# Patient Record
Sex: FEMALE | Race: WHITE | NOT HISPANIC OR LATINO | Employment: FULL TIME | URBAN - NONMETROPOLITAN AREA
[De-identification: names, ages, dates, MRNs, and addresses within clinical notes are randomized per-mention and may not be internally consistent; named-entity substitution may affect disease eponyms.]

---

## 2017-06-14 ENCOUNTER — OFFICE VISIT (OUTPATIENT)
Dept: FAMILY MEDICINE CLINIC | Facility: CLINIC | Age: 22
End: 2017-06-14

## 2017-06-14 VITALS
HEIGHT: 67 IN | BODY MASS INDEX: 40.65 KG/M2 | SYSTOLIC BLOOD PRESSURE: 140 MMHG | TEMPERATURE: 98.6 F | DIASTOLIC BLOOD PRESSURE: 100 MMHG | OXYGEN SATURATION: 95 % | WEIGHT: 259 LBS | HEART RATE: 79 BPM

## 2017-06-14 DIAGNOSIS — E66.01 MORBID OBESITY DUE TO EXCESS CALORIES (HCC): Primary | ICD-10-CM

## 2017-06-14 DIAGNOSIS — I10 ESSENTIAL HYPERTENSION: ICD-10-CM

## 2017-06-14 PROCEDURE — 99214 OFFICE O/P EST MOD 30 MIN: CPT | Performed by: FAMILY MEDICINE

## 2017-06-14 RX ORDER — PHENTERMINE HYDROCHLORIDE 15 MG/1
15 CAPSULE ORAL EVERY MORNING
Qty: 30 CAPSULE | Refills: 0 | Status: SHIPPED | OUTPATIENT
Start: 2017-06-14 | End: 2017-07-12 | Stop reason: SDUPTHER

## 2017-06-14 RX ORDER — LISINOPRIL 10 MG/1
10 TABLET ORAL DAILY
Qty: 90 TABLET | Refills: 1 | Status: SHIPPED | OUTPATIENT
Start: 2017-06-14 | End: 2017-07-12 | Stop reason: SDUPTHER

## 2017-06-14 NOTE — PATIENT INSTRUCTIONS
Calorie Counting for Weight Loss  Calories are energy you get from the things you eat and drink. Your body uses this energy to keep you going throughout the day. The number of calories you eat affects your weight. When you eat more calories than your body needs, your body stores the extra calories as fat. When you eat fewer calories than your body needs, your body burns fat to get the energy it needs.  Calorie counting means keeping track of how many calories you eat and drink each day. If you make sure to eat fewer calories than your body needs, you should lose weight. In order for calorie counting to work, you will need to eat the number of calories that are right for you in a day to lose a healthy amount of weight per week. A healthy amount of weight to lose per week is usually 1-2 lb (0.5-0.9 kg). A dietitian can determine how many calories you need in a day and give you suggestions on how to reach your calorie goal.   WHAT IS MY MY PLAN?  My goal is to have __________ calories per day.   If I have this many calories per day, I should lose around __________ pounds per week.  WHAT DO I NEED TO KNOW ABOUT CALORIE COUNTING?  In order to meet your daily calorie goal, you will need to:  · Find out how many calories are in each food you would like to eat. Try to do this before you eat.  · Decide how much of the food you can eat.  · Write down what you ate and how many calories it had. Doing this is called keeping a food log.  WHERE DO I FIND CALORIE INFORMATION?  The number of calories in a food can be found on a Nutrition Facts label. Note that all the information on a label is based on a specific serving of the food. If a food does not have a Nutrition Facts label, try to look up the calories online or ask your dietitian for help.  HOW DO I DECIDE HOW MUCH TO EAT?  To decide how much of the food you can eat, you will need to consider both the number of calories in one serving and the size of one serving. This  information can be found on the Nutrition Facts label. If a food does not have a Nutrition Facts label, look up the information online or ask your dietitian for help.  Remember that calories are listed per serving. If you choose to have more than one serving of a food, you will have to multiply the calories per serving by the amount of servings you plan to eat. For example, the label on a package of bread might say that a serving size is 1 slice and that there are 90 calories in a serving. If you eat 1 slice, you will have eaten 90 calories. If you eat 2 slices, you will have eaten 180 calories.  HOW DO I KEEP A FOOD LOG?  After each meal, record the following information in your food log:  · What you ate.  · How much of it you ate.  · How many calories it had.  · Then, add up your calories.  Keep your food log near you, such as in a small notebook in your pocket. Another option is to use a mobile meg or website. Some programs will calculate calories for you and show you how many calories you have left each time you add an item to the log.  WHAT ARE SOME CALORIE COUNTING TIPS?  · Use your calories on foods and drinks that will fill you up and not leave you hungry. Some examples of this include foods like nuts and nut butters, vegetables, lean proteins, and high-fiber foods (more than 5 g fiber per serving).  · Eat nutritious foods and avoid empty calories. Empty calories are calories you get from foods or beverages that do not have many nutrients, such as candy and soda. It is better to have a nutritious high-calorie food (such as an avocado) than a food with few nutrients (such as a bag of chips).  · Know how many calories are in the foods you eat most often. This way, you do not have to look up how many calories they have each time you eat them.  · Look out for foods that may seem like low-calorie foods but are really high-calorie foods, such as baked goods, soda, and fat-free candy.  · Pay attention to calories  in drinks. Drinks such as sodas, specialty coffee drinks, alcohol, and juices have a lot of calories yet do not fill you up. Choose low-calorie drinks like water and diet drinks.  · Focus your calorie counting efforts on higher calorie items. Logging the calories in a garden salad that contains only vegetables is less important than calculating the calories in a milk shake.  · Find a way of tracking calories that works for you. Get creative. Most people who are successful find ways to keep track of how much they eat in a day, even if they do not count every calorie.  WHAT ARE SOME PORTION CONTROL TIPS?  · Know how many calories are in a serving. This will help you know how many servings of a certain food you can have.  · Use a measuring cup to measure serving sizes. This is helpful when you start out. With time, you will be able to estimate serving sizes for some foods.  · Take some time to put servings of different foods on your favorite plates, bowls, and cups so you know what a serving looks like.  · Try not to eat straight from a bag or box. Doing this can lead to overeating. Put the amount you would like to eat in a cup or on a plate to make sure you are eating the right portion.  · Use smaller plates, glasses, and bowls to prevent overeating. This is a quick and easy way to practice portion control. If your plate is smaller, less food can fit on it.  · Try not to multitask while eating, such as watching TV or using your computer. If it is time to eat, sit down at a table and enjoy your food. Doing this will help you to start recognizing when you are full. It will also make you more aware of what and how much you are eating.  HOW CAN I CALORIE COUNT WHEN EATING OUT?  · Ask for smaller portion sizes or child-sized portions.  · Consider sharing an entree and sides instead of getting your own entree.  · If you get your own entree, eat only half. Ask for a box at the beginning of your meal and put the rest of your  "entree in it so you are not tempted to eat it.  · Look for the calories on the menu. If calories are listed, choose the lower calorie options.  · Choose dishes that include vegetables, fruits, whole grains, low-fat dairy products, and lean protein. Focusing on smart food choices from each of the 5 food groups can help you stay on track at restaurants.  · Choose items that are boiled, broiled, grilled, or steamed.  · Choose water, milk, unsweetened iced tea, or other drinks without added sugars. If you want an alcoholic beverage, choose a lower calorie option. For example, a regular slim can have up to 700 calories and a glass of wine has around 150.  · Stay away from items that are buttered, battered, fried, or served with cream sauce. Items labeled \"crispy\" are usually fried, unless stated otherwise.  · Ask for dressings, sauces, and syrups on the side. These are usually very high in calories, so do not eat much of them.  · Watch out for salads. Many people think salads are a healthy option, but this is often not the case. Many salads come with soto, fried chicken, lots of cheese, fried chips, and dressing. All of these items have a lot of calories. If you want a salad, choose a garden salad and ask for grilled meats or steak. Ask for the dressing on the side, or ask for olive oil and vinegar or lemon to use as dressing.  · Estimate how many servings of a food you are given. For example, a serving of cooked rice is ½ cup or about the size of half a tennis ball or one cupcake wrapper. Knowing serving sizes will help you be aware of how much food you are eating at restaurants. The list below tells you how big or small some common portion sizes are based on everyday objects.    1 oz--4 stacked dice.    3 oz--1 deck of cards.    1 tsp--1 dice.    1 Tbsp--½ a Ping-Pong ball.    2 Tbsp--1 Ping-Pong ball.    ½ cup--1 tennis ball or 1 cupcake wrapper.    1 cup--1 baseball.     This information is not intended to " replace advice given to you by your health care provider. Make sure you discuss any questions you have with your health care provider.     Document Released: 12/18/2006 Document Revised: 01/08/2016 Document Reviewed: 10/23/2014  [employer] Interactive Patient Education ©2017 [employer]    Exercising to Lose Weight  Exercising can help you to lose weight. In order to lose weight through exercise, you need to do vigorous-intensity exercise. You can tell that you are exercising with vigorous intensity if you are breathing very hard and fast and cannot hold a conversation while exercising.  Moderate-intensity exercise helps to maintain your current weight. You can tell that you are exercising at a moderate level if you have a higher heart rate and faster breathing, but you are still able to hold a conversation.  HOW OFTEN SHOULD I EXERCISE?  Choose an activity that you enjoy and set realistic goals. Your health care provider can help you to make an activity plan that works for you. Exercise regularly as directed by your health care provider. This may include:  · Doing resistance training twice each week, such as:    Push-ups.    Sit-ups.    Lifting weights.    Using resistance bands.  · Doing a given intensity of exercise for a given amount of time. Choose from these options:    150 minutes of moderate-intensity exercise every week.    75 minutes of vigorous-intensity exercise every week.    A mix of moderate-intensity and vigorous-intensity exercise every week.  Children, pregnant women, people who are out of shape, people who are overweight, and older adults may need to consult a health care provider for individual recommendations. If you have any sort of medical condition, be sure to consult your health care provider before starting a new exercise program.  WHAT ARE SOME ACTIVITIES THAT CAN HELP ME TO LOSE WEIGHT?   · Walking at a rate of at least 4.5 miles an hour.  · Jogging or running at a rate of 5 miles per  hour.  · Biking at a rate of at least 10 miles per hour.  · Lap swimming.  · Roller-skating or in-line skating.  · Cross-country skiing.  · Vigorous competitive sports, such as football, basketball, and soccer.  · Jumping rope.  · Aerobic dancing.  HOW CAN I BE MORE ACTIVE IN MY DAY-TO-DAY ACTIVITIES?  · Use the stairs instead of the elevator.  · Take a walk during your lunch break.  · If you drive, park your car farther away from work or school.  · If you take public transportation, get off one stop early and walk the rest of the way.  · Make all of your phone calls while standing up and walking around.  · Get up, stretch, and walk around every 30 minutes throughout the day.  WHAT GUIDELINES SHOULD I FOLLOW WHILE EXERCISING?  · Do not exercise so much that you hurt yourself, feel dizzy, or get very short of breath.  · Consult your health care provider prior to starting a new exercise program.  · Wear comfortable clothes and shoes with good support.  · Drink plenty of water while you exercise to prevent dehydration or heat stroke. Body water is lost during exercise and must be replaced.  · Work out until you breathe faster and your heart beats faster.     This information is not intended to replace advice given to you by your health care provider. Make sure you discuss any questions you have with your health care provider.     Document Released: 01/20/2012 Document Revised: 01/08/2016 Document Reviewed: 05/21/2015  [employer] Interactive Patient Education ©2017 [employer]

## 2017-06-14 NOTE — PROGRESS NOTES
"Subjective   Chief Complaint   Patient presents with   • wants weight loss medication     Alka Boykin is a 22 y.o. female.   wants weight loss medication    History of Present Illness     Morbid obesity - struggling to lose weight  Has failed a low calorie diet, low carbohydrate diet, truevision, arana, barros  Restricted caffeine, just drinking water  Exercises daily with walking - 4 miles    Hypertension - last two office visits blood pressure had been elevated    The following portions of the patient's history were reviewed and updated as appropriate: allergies, current medications, past family history, past medical history, past social history, past surgical history and problem list.    Review of Systems   Constitutional: Negative for appetite change, chills, fatigue and fever.   HENT: Negative for congestion, ear pain, rhinorrhea and sore throat.    Eyes: Negative for pain.   Respiratory: Negative for cough and shortness of breath.    Cardiovascular: Negative for chest pain and palpitations.   Gastrointestinal: Negative for abdominal pain, constipation, diarrhea and nausea.   Genitourinary: Negative for dysuria.   Musculoskeletal: Negative for back pain, joint swelling and neck pain.   Skin: Negative for rash.   Neurological: Negative for dizziness and headaches.       Objective   /100  Pulse 79  Temp 98.6 °F (37 °C)  Ht 67\" (170.2 cm)  Wt 259 lb (117 kg)  LMP 06/14/2017  SpO2 95%  BMI 40.57 kg/m2  Physical Exam   Constitutional: She is oriented to person, place, and time. She appears well-developed and well-nourished.   HENT:   Head: Normocephalic and atraumatic.   Eyes: Pupils are equal, round, and reactive to light.   Neck: Normal range of motion. Neck supple.   Cardiovascular: Normal rate, regular rhythm and normal heart sounds.    Pulmonary/Chest: Effort normal and breath sounds normal. No respiratory distress. She has no wheezes. She has no rales.   Abdominal: Soft. Bowel sounds are " normal.   Central obesity   Musculoskeletal: Normal range of motion.   Neurological: She is alert and oriented to person, place, and time.   Skin: Skin is warm and dry.   Psychiatric: She has a normal mood and affect.   Nursing note and vitals reviewed.      Assessment/Plan   Problems Addressed this Visit        Digestive    Morbid obesity due to excess calories - Primary      Other Visit Diagnoses     Essential hypertension        Relevant Medications    lisinopril (PRINIVIL,ZESTRIL) 10 MG tablet        Medical release from Dr Hess - lab results, papsmear    Start phentermine  ra reviewed [id number]  RA query complete. Treatment plan to include limited course of prescribed  controlled substance. Risks including addiction, benefits, and alternatives presented to patient.   Controlled contract signed  Weight loss information provided to the patient    Start lisinopril    Recheck in 4 weeks

## 2017-07-12 ENCOUNTER — OFFICE VISIT (OUTPATIENT)
Dept: FAMILY MEDICINE CLINIC | Facility: CLINIC | Age: 22
End: 2017-07-12

## 2017-07-12 VITALS
BODY MASS INDEX: 39.39 KG/M2 | DIASTOLIC BLOOD PRESSURE: 84 MMHG | TEMPERATURE: 97.2 F | HEART RATE: 84 BPM | OXYGEN SATURATION: 98 % | WEIGHT: 251 LBS | HEIGHT: 67 IN | SYSTOLIC BLOOD PRESSURE: 140 MMHG

## 2017-07-12 DIAGNOSIS — E66.09 NON MORBID OBESITY DUE TO EXCESS CALORIES: ICD-10-CM

## 2017-07-12 DIAGNOSIS — E66.01 MORBID OBESITY DUE TO EXCESS CALORIES (HCC): ICD-10-CM

## 2017-07-12 DIAGNOSIS — I10 ESSENTIAL HYPERTENSION: Primary | ICD-10-CM

## 2017-07-12 PROCEDURE — 99214 OFFICE O/P EST MOD 30 MIN: CPT | Performed by: FAMILY MEDICINE

## 2017-07-12 RX ORDER — PHENTERMINE HYDROCHLORIDE 15 MG/1
15 CAPSULE ORAL EVERY MORNING
Qty: 30 CAPSULE | Refills: 0 | Status: SHIPPED | OUTPATIENT
Start: 2017-07-12 | End: 2017-08-09 | Stop reason: SDUPTHER

## 2017-07-12 RX ORDER — LISINOPRIL 10 MG/1
10 TABLET ORAL DAILY
Qty: 90 TABLET | Refills: 1 | Status: SHIPPED | OUTPATIENT
Start: 2017-07-12 | End: 2017-07-12

## 2017-07-12 RX ORDER — LISINOPRIL 10 MG/1
20 TABLET ORAL DAILY
Qty: 90 TABLET | Refills: 1
Start: 2017-07-12 | End: 2017-11-07

## 2017-07-12 NOTE — PATIENT INSTRUCTIONS
Recommend metamucil or benefiber daily  Or can increase dietary fiber to your daily diet  Continue with diet, exercise and weight loss  Refilled phentermine  Adjusted lisinopril to 20mg per day  Recheck in 4 weeks

## 2017-07-12 NOTE — PROGRESS NOTES
"Subjective   Chief Complaint   Patient presents with   • Weight Check     4 week follow up   • Med Refill     Alka Boykin is a 22 y.o. female.   Weight Check (4 week follow up) and Med Refill    History of Present Illness     Morbid obesity -  Started with phentermine last month and has successfully lost weight  Current weight at 251lbs, lost 8 lbs from her last visit  Has dropped from BMI 40 to 39  Is walking 4 miles per day and swimming often in the pool  She is following a restricted diet  Side effect reported with diarrhea - 2-3 episodes per day  Has failed a low calorie diet, low carbohydrate diet, truevision, arana, barros  Restricted caffeine, just drinking water    Hypertension - not controlled with lisinopril    The following portions of the patient's history were reviewed and updated as appropriate: allergies, current medications, past family history, past medical history, past social history, past surgical history and problem list.    Review of Systems   Constitutional: Negative for appetite change, chills, fatigue and fever.   HENT: Negative for congestion, ear pain, rhinorrhea and sore throat.    Eyes: Negative for pain.   Respiratory: Negative for cough and shortness of breath.    Cardiovascular: Negative for chest pain and palpitations.   Gastrointestinal: Positive for diarrhea. Negative for abdominal pain, constipation and nausea.   Genitourinary: Negative for dysuria.   Musculoskeletal: Negative for back pain, joint swelling and neck pain.   Skin: Negative for rash.   Neurological: Negative for dizziness and headaches.       Objective   /84  Pulse 84  Temp 97.2 °F (36.2 °C)  Ht 67\" (170.2 cm)  Wt 251 lb (114 kg)  LMP 06/14/2017  SpO2 98%  BMI 39.31 kg/m2  Physical Exam   Constitutional: She is oriented to person, place, and time. She appears well-developed and well-nourished.   HENT:   Head: Normocephalic and atraumatic.   Eyes: Pupils are equal, round, and reactive to light. "   Neck: Normal range of motion. Neck supple.   Cardiovascular: Normal rate, regular rhythm and normal heart sounds.    Pulmonary/Chest: Effort normal and breath sounds normal. No respiratory distress. She has no wheezes. She has no rales.   Abdominal: Soft. Bowel sounds are normal.   Central obesity   Musculoskeletal: Normal range of motion.   Neurological: She is alert and oriented to person, place, and time.   Skin: Skin is warm and dry.   Psychiatric: She has a normal mood and affect.   Nursing note and vitals reviewed.      Assessment/Plan   Problems Addressed this Visit        Digestive    Non morbid obesity due to excess calories    RESOLVED: Morbid obesity due to excess calories      Other Visit Diagnoses     Essential hypertension    -  Primary    Relevant Medications    lisinopril (PRINIVIL,ZESTRIL) 10 MG tablet        Recommended metamucil or benefiber daily  Or can increase dietary fiber to your daily diet  Continue with diet, exercise and weight loss  Refilled phentermine  Adjusted lisinopril to 20mg per day  Recheck in 4 weeks

## 2017-08-09 ENCOUNTER — OFFICE VISIT (OUTPATIENT)
Dept: FAMILY MEDICINE CLINIC | Facility: CLINIC | Age: 22
End: 2017-08-09

## 2017-08-09 VITALS
HEIGHT: 67 IN | DIASTOLIC BLOOD PRESSURE: 70 MMHG | BODY MASS INDEX: 39.08 KG/M2 | SYSTOLIC BLOOD PRESSURE: 120 MMHG | OXYGEN SATURATION: 98 % | TEMPERATURE: 97.6 F | WEIGHT: 249 LBS | HEART RATE: 109 BPM

## 2017-08-09 DIAGNOSIS — I10 ESSENTIAL HYPERTENSION: ICD-10-CM

## 2017-08-09 DIAGNOSIS — E66.09 NON MORBID OBESITY DUE TO EXCESS CALORIES: Primary | ICD-10-CM

## 2017-08-09 PROCEDURE — 99213 OFFICE O/P EST LOW 20 MIN: CPT | Performed by: FAMILY MEDICINE

## 2017-08-09 RX ORDER — PHENTERMINE HYDROCHLORIDE 30 MG/1
30 CAPSULE ORAL EVERY MORNING
Qty: 30 CAPSULE | Refills: 0 | Status: SHIPPED | OUTPATIENT
Start: 2017-08-09 | End: 2017-11-07

## 2017-08-09 RX ORDER — LISINOPRIL 10 MG/1
20 TABLET ORAL DAILY
Qty: 90 TABLET | Refills: 1 | Status: CANCELLED
Start: 2017-08-09 | End: 2018-02-05

## 2017-08-09 NOTE — PROGRESS NOTES
"Subjective   Chief Complaint   Patient presents with   • Weight Check     4 week follow up   • Med Refill     Alka Boykin is a 22 y.o. female.   Weight Check (4 week follow up) and Med Refill    History of Present Illness     Morbid obesity -  Gradually improving with phentermine  Current weight at 249lbs, down 2lbs from last visit  Current BMI at 39  Is walking 4 miles per day and swimming often in the pool  She is following a restricted diet  Has failed a low calorie diet, low carbohydrate diet, truevision, arana, barros  Restricted caffeine, just drinking water  Would like medication adjusted today    Hypertension - controlled with lisinopril    The following portions of the patient's history were reviewed and updated as appropriate: allergies, current medications, past family history, past medical history, past social history, past surgical history and problem list.    Review of Systems   Constitutional: Negative for appetite change, chills, fatigue and fever.   HENT: Negative for congestion, ear pain, rhinorrhea and sore throat.    Eyes: Negative for pain.   Respiratory: Negative for cough and shortness of breath.    Cardiovascular: Negative for chest pain and palpitations.   Gastrointestinal: Negative for abdominal pain, constipation, diarrhea and nausea.   Genitourinary: Negative for dysuria.   Musculoskeletal: Negative for back pain, joint swelling and neck pain.   Skin: Negative for rash.   Neurological: Negative for dizziness and headaches.       Objective   /70  Pulse 109  Temp 97.6 °F (36.4 °C)  Ht 67\" (170.2 cm)  Wt 249 lb (113 kg)  LMP 07/11/2017  SpO2 98%  BMI 39 kg/m2  Physical Exam   Constitutional: She is oriented to person, place, and time. She appears well-developed and well-nourished.   HENT:   Head: Normocephalic and atraumatic.   Eyes: Pupils are equal, round, and reactive to light.   Neck: Normal range of motion. Neck supple.   Cardiovascular: Normal rate, regular rhythm and " normal heart sounds.    Pulmonary/Chest: Effort normal and breath sounds normal. No respiratory distress. She has no wheezes. She has no rales.   Abdominal: Soft. Bowel sounds are normal.   Musculoskeletal: Normal range of motion.   Neurological: She is alert and oriented to person, place, and time.   Skin: Skin is warm and dry.   Psychiatric: She has a normal mood and affect.   Nursing note and vitals reviewed.      Assessment/Plan   Problems Addressed this Visit        Cardiovascular and Mediastinum    Essential hypertension       Digestive    Non morbid obesity due to excess calories - Primary        Continue with lisinopril    Adjusted phentermine    PHQ done    Monitor HR    Discussed diet, exercise and weight loss    Recheck in 4 weeks

## 2017-11-07 ENCOUNTER — OFFICE VISIT (OUTPATIENT)
Dept: FAMILY MEDICINE CLINIC | Facility: CLINIC | Age: 22
End: 2017-11-07

## 2017-11-07 VITALS
HEIGHT: 67 IN | SYSTOLIC BLOOD PRESSURE: 120 MMHG | BODY MASS INDEX: 40.18 KG/M2 | HEART RATE: 106 BPM | DIASTOLIC BLOOD PRESSURE: 74 MMHG | WEIGHT: 256 LBS | OXYGEN SATURATION: 99 % | TEMPERATURE: 98 F

## 2017-11-07 DIAGNOSIS — E28.2 PCOS (POLYCYSTIC OVARIAN SYNDROME): Primary | ICD-10-CM

## 2017-11-07 DIAGNOSIS — E66.09 NON MORBID OBESITY DUE TO EXCESS CALORIES: ICD-10-CM

## 2017-11-07 DIAGNOSIS — Z31.9 PATIENT DESIRES PREGNANCY: ICD-10-CM

## 2017-11-07 PROBLEM — I10 ESSENTIAL HYPERTENSION: Status: RESOLVED | Noted: 2017-08-09 | Resolved: 2017-11-07

## 2017-11-07 PROCEDURE — 99214 OFFICE O/P EST MOD 30 MIN: CPT | Performed by: FAMILY MEDICINE

## 2017-11-07 RX ORDER — PRENATAL VIT NO.126/IRON/FOLIC 28MG-0.8MG
1 TABLET ORAL DAILY
Qty: 90 TABLET | Refills: 3 | Status: SHIPPED | OUTPATIENT
Start: 2017-11-07 | End: 2018-03-23

## 2017-11-07 NOTE — PROGRESS NOTES
"Subjective   Chief Complaint   Patient presents with   • Weight Check     3 months   • wants to talk about ovaries     Alka Boykin is a 22 y.o. female.   Weight Check (3 months) and wants to talk about ovaries    History of Present Illness     Morbid obesity -  Remains uncontrolled  Current weight at 256lbs, Current BMI at 40    Hypertension - resolved    PCOS - patient desires pregnancy  Currently taking prenatal vitamins  Worried about PCOS diagnosis - unsure about whether this is a true and active problem  She did see Dr Meredith and Dr Hess  Has received 5 rounds of clomid with metformin and was unable to conceive   Her  has been evaluated - no significant findings  She has been following an ovulation calendar and used ovulation sticks    The following portions of the patient's history were reviewed and updated as appropriate: allergies, current medications, past family history, past medical history, past social history, past surgical history and problem list.    Review of Systems   Constitutional: Negative for appetite change, chills, fatigue and fever.   HENT: Negative for congestion, ear pain, rhinorrhea and sore throat.    Eyes: Negative for pain.   Respiratory: Negative for cough and shortness of breath.    Cardiovascular: Negative for chest pain and palpitations.   Gastrointestinal: Negative for abdominal pain, constipation, diarrhea and nausea.   Genitourinary: Negative for dysuria.   Musculoskeletal: Negative for back pain, joint swelling and neck pain.   Skin: Negative for rash.   Neurological: Negative for dizziness and headaches.       Objective   /74  Pulse 106  Temp 98 °F (36.7 °C)  Ht 67\" (170.2 cm)  Wt 256 lb (116 kg)  LMP 10/14/2017  SpO2 99%  BMI 40.1 kg/m2  Physical Exam   Constitutional: She is oriented to person, place, and time. She appears well-developed and well-nourished.   HENT:   Head: Normocephalic and atraumatic.   Eyes: Pupils are equal, round, and " reactive to light.   Neck: Normal range of motion. Neck supple.   Cardiovascular: Normal rate, regular rhythm and normal heart sounds.    Pulmonary/Chest: Effort normal and breath sounds normal. No respiratory distress. She has no wheezes. She has no rales.   Abdominal: Soft. Bowel sounds are normal.   Central obesity   Neurological: She is alert and oriented to person, place, and time.   Skin: Skin is warm and dry.   Psychiatric: She has a normal mood and affect.   tearful   Nursing note and vitals reviewed.      Assessment/Plan   Problems Addressed this Visit        Digestive    Non morbid obesity due to excess calories      Other Visit Diagnoses     PCOS (polycystic ovarian syndrome)    -  Primary    Relevant Orders    Ambulatory Referral to Gynecology    Patient desires pregnancy        Relevant Orders    Ambulatory Referral to Gynecology        Call to Kristel Redd's office  Reviewed medical records  Referral to Kristel for further evaluation and possible referral to fertility  Updated medication list  Recommended diet, exercise and weight loss  Continue with prenatal vitamins  Recheck as needed

## 2017-11-15 ENCOUNTER — OFFICE VISIT (OUTPATIENT)
Dept: OBSTETRICS AND GYNECOLOGY | Facility: CLINIC | Age: 22
End: 2017-11-15

## 2017-11-15 VITALS
HEART RATE: 76 BPM | WEIGHT: 262 LBS | RESPIRATION RATE: 18 BRPM | BODY MASS INDEX: 41.12 KG/M2 | DIASTOLIC BLOOD PRESSURE: 80 MMHG | SYSTOLIC BLOOD PRESSURE: 122 MMHG | HEIGHT: 67 IN

## 2017-11-15 DIAGNOSIS — Z78.9 ATTEMPTING TO CONCEIVE: ICD-10-CM

## 2017-11-15 DIAGNOSIS — E28.2 PCOS (POLYCYSTIC OVARIAN SYNDROME): Primary | ICD-10-CM

## 2017-11-15 DIAGNOSIS — N97.0 ANOVULATION: ICD-10-CM

## 2017-11-15 PROCEDURE — 99214 OFFICE O/P EST MOD 30 MIN: CPT | Performed by: NURSE PRACTITIONER

## 2017-11-15 RX ORDER — METFORMIN HYDROCHLORIDE 500 MG/1
1000 TABLET, EXTENDED RELEASE ORAL
Qty: 60 TABLET | Refills: 5 | Status: SHIPPED | OUTPATIENT
Start: 2017-11-15 | End: 2018-03-07 | Stop reason: SDUPTHER

## 2017-11-15 NOTE — PROGRESS NOTES
"Mary Beth Boykin is a 22 y.o. female.     History of Present Illness   Pt presents for further evaluation and management of PCOS and difficulty conceiving. Pt notes she and her  TTC x 2 years. Approximately 1 year ago, pt saw Dr. Charles for this. States she started Metformin 500mg, tried to increase to 1000mg but was having low glucose episodes, and continued at 500mg daily without problems. Without success, Melvin started pt on Clomid. According to records, 50mg x 3 months, 100mg x 2. She states she was told she was ovulating because she began having regular periods. She denies having a day 21 progesterone. She stopped metformin and clomid 2 months ago out of frustration with lack of success. She saw Dr. Martinez, OB/GYN for second opinion and was prescribed another round of 100mg clomid but has not taken. US in August with Dr. Martinez confirms multiple follicular cysts and 1 small hemorrhagic cyst.     Patient's last menstrual period was 11/14/2017 (exact date). Periods are regular \"on the 14th of every month\" since starting clomid. Last 2-3 days, pretty heavy. Pt tracks on a paper calendar.     Pap, G/C testing normal on 2/4/17. Since January lost 40lbs on low carb diet and walking 4 miles a day. She is using pre-seed lubricants.     Pt states she had 2 postcoital sperm checks with Dr. Charles that were positive. Dr. Martinez ordered her  a SA but it has not been performed. She agrees to pursue this and have him checked. He is a diabetic.     She states she is having sex 2 times per day most days. Raises the pelvis and lies flat x 30 minutes.     The following portions of the patient's history were reviewed and updated as appropriate: allergies, current medications, past family history, past medical history, past social history, past surgical history and problem list.    Review of Systems   Constitutional: Negative.         Obesity, intentional weight loss   Endocrine: Negative.  " Negative for cold intolerance, heat intolerance, polydipsia, polyphagia and polyuria.   Genitourinary: Positive for menstrual problem. Negative for dyspareunia, pelvic pain, vaginal discharge and vaginal pain.   Neurological: Negative for dizziness, syncope, light-headedness and headaches.   Psychiatric/Behavioral: Negative for suicidal ideas. The patient is nervous/anxious.         Anger and frustration       Objective   Physical Exam   Constitutional: She is oriented to person, place, and time. She appears well-developed and well-nourished.   Last 2 weights  -------------------------              11/15/17                    1056        -------------------------   Weight: 262 lb (119 kg)  -------------------------  Body mass index is 41.04 kg/(m^2).     Cardiovascular: Normal rate, regular rhythm and normal heart sounds.    Pulmonary/Chest: Effort normal and breath sounds normal.   Neurological: She is alert and oriented to person, place, and time.   Skin: Skin is warm and dry.   Psychiatric: Her behavior is normal. Her mood appears anxious. She exhibits a depressed mood.   tearful   Vitals reviewed.      Assessment/Plan   Alka was seen today for talk about pcos and infertility.    Diagnoses and all orders for this visit:    PCOS (polycystic ovarian syndrome)  -     metFORMIN ER (GLUCOPHAGE-XR) 500 MG 24 hr tablet; Take 2 tablets by mouth Daily With Breakfast.  -     Progesterone; Future    Anovulation  -     metFORMIN ER (GLUCOPHAGE-XR) 500 MG 24 hr tablet; Take 2 tablets by mouth Daily With Breakfast.  -     Progesterone; Future    Attempting to conceive  -     metFORMIN ER (GLUCOPHAGE-XR) 500 MG 24 hr tablet; Take 2 tablets by mouth Daily With Breakfast.  -     Progesterone; Future       Extensive education and counseling provided on PCOS and insulin resistance and infertility  x 30 minutes. Discussed possible use of Femara. Pt wishes to proceed with Clomid 100mg again for now.     Begin  Clomiphene citrate (Clomid) 100mg Cycle Days 3-7 and Guaifenisen 2Tbsp daily beginning the day after last Clomid dose and taken continually through ovulation. Partner to also take Guaifenisen 2TBSP daily during this time.     Begin Ovulation predictor kits 3 days after last Clomid dose, have intercourse every other day, no more than once a day, beginning 3 days after last Clomid dose (Cycle day 10).  If you use lubricants, look into Pre-Seed Lubricant that should not interfere with sperm motility.    Have Day 21 progesterone lab draw due Dec 4th; Urine HCG home tests before next round of Clomid.    Will try 3 successful rounds before referral.  Always continue to take Prenatal Vitamins with Folic Acid. Take Metformin daily. Try taking 2 at breakfast after 1 week of 1 at breakfast if GI symptoms tolerable and no episodes of low glucose.     Recommend continuing low carb diet with a goal of weight loss 1lb per week. Encouraged and reassured pt to exercise and diet despite emotions and infertility for overall health and prevention of co-morbidities.

## 2017-12-04 ENCOUNTER — LAB (OUTPATIENT)
Dept: LAB | Facility: OTHER | Age: 22
End: 2017-12-04

## 2017-12-04 DIAGNOSIS — Z78.9 ATTEMPTING TO CONCEIVE: ICD-10-CM

## 2017-12-04 DIAGNOSIS — N97.0 ANOVULATION: ICD-10-CM

## 2017-12-04 DIAGNOSIS — E28.2 PCOS (POLYCYSTIC OVARIAN SYNDROME): ICD-10-CM

## 2017-12-04 PROCEDURE — 84144 ASSAY OF PROGESTERONE: CPT | Performed by: NURSE PRACTITIONER

## 2017-12-05 LAB — PROGEST SERPL-MCNC: 19.9 NG/ML

## 2017-12-15 ENCOUNTER — DOCUMENTATION (OUTPATIENT)
Dept: OBSTETRICS AND GYNECOLOGY | Facility: CLINIC | Age: 22
End: 2017-12-15

## 2017-12-15 NOTE — PROGRESS NOTES
Pt called stating she started her period yesterday. 31 day cycle with positive ovulation according to progesterone of 19 on day 21. Pt's  still did not complete semen analysis. This was patient's 6th round of clomid. I recommend pt take a break, have  complete semen analysis. Regardless of results, I recommend pt proceed to reproductive endocrinology for further evaluation. I discussed risks of long term clomid use and she verbalizes understanding. She is rightfully stressed and upset with the process of infertility but understands. Pt will call me when he receives his testing and/or she wishes to proceed with referral.

## 2018-02-28 DIAGNOSIS — N97.0 INFERTILITY ASSOCIATED WITH ANOVULATION: Primary | ICD-10-CM

## 2018-03-05 ENCOUNTER — LAB (OUTPATIENT)
Dept: LAB | Facility: OTHER | Age: 23
End: 2018-03-05

## 2018-03-05 DIAGNOSIS — N97.0 INFERTILITY ASSOCIATED WITH ANOVULATION: ICD-10-CM

## 2018-03-05 PROCEDURE — 36415 COLL VENOUS BLD VENIPUNCTURE: CPT | Performed by: NURSE PRACTITIONER

## 2018-03-05 PROCEDURE — 84144 ASSAY OF PROGESTERONE: CPT | Performed by: NURSE PRACTITIONER

## 2018-03-06 LAB — PROGEST SERPL-MCNC: 0.6 NG/ML

## 2018-03-07 DIAGNOSIS — Z78.9 ATTEMPTING TO CONCEIVE: ICD-10-CM

## 2018-03-07 DIAGNOSIS — N97.0 ANOVULATION: ICD-10-CM

## 2018-03-07 DIAGNOSIS — E28.2 PCOS (POLYCYSTIC OVARIAN SYNDROME): ICD-10-CM

## 2018-03-07 RX ORDER — METFORMIN HYDROCHLORIDE 500 MG/1
1000 TABLET, EXTENDED RELEASE ORAL
Qty: 60 TABLET | Refills: 5 | Status: SHIPPED | OUTPATIENT
Start: 2018-03-07 | End: 2018-09-26

## 2018-03-07 NOTE — PROGRESS NOTES
Progesterone is low. Did not ovulate. Pt's  has still not been tested. Once he is tested I will be happy to move forward with femara or refer to reproductive endoocrinology. Pt verbalizes understanding. Continue metformin and prenatals

## 2018-03-23 ENCOUNTER — OFFICE VISIT (OUTPATIENT)
Dept: FAMILY MEDICINE CLINIC | Facility: CLINIC | Age: 23
End: 2018-03-23

## 2018-03-23 VITALS
BODY MASS INDEX: 42.53 KG/M2 | WEIGHT: 271 LBS | SYSTOLIC BLOOD PRESSURE: 134 MMHG | OXYGEN SATURATION: 98 % | TEMPERATURE: 98 F | DIASTOLIC BLOOD PRESSURE: 76 MMHG | HEART RATE: 96 BPM | HEIGHT: 67 IN

## 2018-03-23 DIAGNOSIS — Z71.3 DIETARY COUNSELING AND SURVEILLANCE: ICD-10-CM

## 2018-03-23 DIAGNOSIS — IMO0001 CLASS 3 OBESITY DUE TO EXCESS CALORIES WITHOUT SERIOUS COMORBIDITY WITH BODY MASS INDEX (BMI) OF 40.0 TO 44.9 IN ADULT: Primary | ICD-10-CM

## 2018-03-23 PROBLEM — E66.09 NON MORBID OBESITY DUE TO EXCESS CALORIES: Status: RESOLVED | Noted: 2017-07-12 | Resolved: 2018-03-23

## 2018-03-23 PROCEDURE — 99213 OFFICE O/P EST LOW 20 MIN: CPT | Performed by: FAMILY MEDICINE

## 2018-03-23 RX ORDER — TRIAMCINOLONE ACETONIDE 40 MG/ML
80 INJECTION, SUSPENSION INTRA-ARTICULAR; INTRAMUSCULAR ONCE
Status: DISCONTINUED | OUTPATIENT
Start: 2018-03-23 | End: 2018-03-23

## 2018-03-23 RX ORDER — PHENTERMINE HYDROCHLORIDE 30 MG/1
30 CAPSULE ORAL EVERY MORNING
Qty: 30 CAPSULE | Refills: 0 | Status: SHIPPED | OUTPATIENT
Start: 2018-03-23 | End: 2018-07-25

## 2018-03-23 NOTE — PROGRESS NOTES
"Subjective   Chief Complaint   Patient presents with   • wants phentermine     Alka Albert is a 22 y.o. female.   wants phentermine    History of Present Illness     Presents today for weight loss information   Has been struggling with weight loss  Would like to start phentermine  Has taken medication in the past  She had no trouble or side effects with the medication previously  Current weight is 271lbs, BMI 42  Has been following a restricted diet - eliminated pop from her diet or any high calorie beverages  She has a plan in place to start walking when the weather is nice    The following portions of the patient's history were reviewed and updated as appropriate: allergies, current medications, past family history, past medical history, past social history, past surgical history and problem list.    Review of Systems   Constitutional: Negative for appetite change, chills, fatigue and fever.   HENT: Negative for congestion, ear pain, rhinorrhea and sore throat.    Eyes: Negative for pain.   Respiratory: Negative for cough and shortness of breath.    Cardiovascular: Negative for chest pain and palpitations.   Gastrointestinal: Negative for abdominal pain, constipation, diarrhea and nausea.   Genitourinary: Negative for dysuria.   Musculoskeletal: Negative for back pain, joint swelling and neck pain.   Skin: Negative for rash.   Neurological: Negative for dizziness and headaches.       Objective   /76   Pulse 96   Temp 98 °F (36.7 °C)   Ht 170.2 cm (67.01\")   Wt 123 kg (271 lb)   LMP 03/23/2018 (Exact Date)   SpO2 98%   BMI 42.43 kg/m²   Physical Exam   Constitutional: She is oriented to person, place, and time. She appears well-developed and well-nourished.   HENT:   Head: Normocephalic and atraumatic.   Eyes: Pupils are equal, round, and reactive to light.   Neck: Normal range of motion. Neck supple.   Cardiovascular: Normal rate, regular rhythm and normal heart sounds.    Pulmonary/Chest: Effort " normal and breath sounds normal. No respiratory distress. She has no wheezes. She has no rales.   Abdominal: Soft. Bowel sounds are normal.   Musculoskeletal: Normal range of motion.   Neurological: She is alert and oriented to person, place, and time.   Skin: Skin is warm and dry.   Psychiatric: She has a normal mood and affect.   Nursing note and vitals reviewed.      Assessment/Plan   Problems Addressed this Visit     None      Visit Diagnoses     Class 3 obesity due to excess calories without serious comorbidity with body mass index (BMI) of 40.0 to 44.9 in adult    -  Primary    Relevant Medications    phentermine 30 MG capsule    Dietary counseling and surveillance            Discussed the patient's BMI with her. BMI is above normal parameters. Follow-up plan includes:  educational material, exercise counseling, nutrition counseling and pharmacological intervention.    The patient has read and signed the [facility] Controlled Substance Contract.  I will continue to see patient for regular follow up appointments.  They are well controlled on their medication.  RA is updated every 3 months. The patient is aware of the potential for addiction and dependence.    Reviewed [facility] [phone]    Start phentermine    Recheck in 4 weeks

## 2018-03-23 NOTE — PATIENT INSTRUCTIONS
Calorie Counting for Weight Loss  Calories are units of energy. Your body needs a certain amount of calories from food to keep you going throughout the day. When you eat more calories than your body needs, your body stores the extra calories as fat. When you eat fewer calories than your body needs, your body burns fat to get the energy it needs.  Calorie counting means keeping track of how many calories you eat and drink each day. Calorie counting can be helpful if you need to lose weight. If you make sure to eat fewer calories than your body needs, you should lose weight. Ask your health care provider what a healthy weight is for you.  For calorie counting to work, you will need to eat the right number of calories in a day in order to lose a healthy amount of weight per week. A dietitian can help you determine how many calories you need in a day and will give you suggestions on how to reach your calorie goal.  · A healthy amount of weight to lose per week is usually 1-2 lb (0.5-0.9 kg). This usually means that your daily calorie intake should be reduced by 500-750 calories.  · Eating 1,200 - 1,500 calories per day can help most women lose weight.  · Eating 1,500 - 1,800 calories per day can help most men lose weight.  What is my plan?  My goal is to have __________ calories per day.  If I have this many calories per day, I should lose around __________ pounds per week.  What do I need to know about calorie counting?  In order to meet your daily calorie goal, you will need to:  · Find out how many calories are in each food you would like to eat. Try to do this before you eat.  · Decide how much of the food you plan to eat.  · Write down what you ate and how many calories it had. Doing this is called keeping a food log.  To successfully lose weight, it is important to balance calorie counting with a healthy lifestyle that includes regular activity. Aim for 150 minutes of moderate exercise (such as walking) or 75  minutes of vigorous exercise (such as running) each week.  Where do I find calorie information?     The number of calories in a food can be found on a Nutrition Facts label. If a food does not have a Nutrition Facts label, try to look up the calories online or ask your dietitian for help.  Remember that calories are listed per serving. If you choose to have more than one serving of a food, you will have to multiply the calories per serving by the amount of servings you plan to eat. For example, the label on a package of bread might say that a serving size is 1 slice and that there are 90 calories in a serving. If you eat 1 slice, you will have eaten 90 calories. If you eat 2 slices, you will have eaten 180 calories.  How do I keep a food log?  Immediately after each meal, record the following information in your food log:  · What you ate. Don't forget to include toppings, sauces, and other extras on the food.  · How much you ate. This can be measured in cups, ounces, or number of items.  · How many calories each food and drink had.  · The total number of calories in the meal.  Keep your food log near you, such as in a small notebook in your pocket, or use a mobile meg or website. Some programs will calculate calories for you and show you how many calories you have left for the day to meet your goal.  What are some calorie counting tips?  · Use your calories on foods and drinks that will fill you up and not leave you hungry:  ¨ Some examples of foods that fill you up are nuts and nut butters, vegetables, lean proteins, and high-fiber foods like whole grains. High-fiber foods are foods with more than 5 g fiber per serving.  ¨ Drinks such as sodas, specialty coffee drinks, alcohol, and juices have a lot of calories, yet do not fill you up.  · Eat nutritious foods and avoid empty calories. Empty calories are calories you get from foods or beverages that do not have many vitamins or protein, such as candy, sweets, and  "soda. It is better to have a nutritious high-calorie food (such as an avocado) than a food with few nutrients (such as a bag of chips).  · Know how many calories are in the foods you eat most often. This will help you calculate calorie counts faster.  · Pay attention to calories in drinks. Low-calorie drinks include water and unsweetened drinks.  · Pay attention to nutrition labels for \"low fat\" or \"fat free\" foods. These foods sometimes have the same amount of calories or more calories than the full fat versions. They also often have added sugar, starch, or salt, to make up for flavor that was removed with the fat.  · Find a way of tracking calories that works for you. Get creative. Try different apps or programs if writing down calories does not work for you.  What are some portion control tips?  · Know how many calories are in a serving. This will help you know how many servings of a certain food you can have.  · Use a measuring cup to measure serving sizes. You could also try weighing out portions on a kitchen scale. With time, you will be able to estimate serving sizes for some foods.  · Take some time to put servings of different foods on your favorite plates, bowls, and cups so you know what a serving looks like.  · Try not to eat straight from a bag or box. Doing this can lead to overeating. Put the amount you would like to eat in a cup or on a plate to make sure you are eating the right portion.  · Use smaller plates, glasses, and bowls to prevent overeating.  · Try not to multitask (for example, watch TV or use your computer) while eating. If it is time to eat, sit down at a table and enjoy your food. This will help you to know when you are full. It will also help you to be aware of what you are eating and how much you are eating.  What are tips for following this plan?  Reading food labels   · Check the calorie count compared to the serving size. The serving size may be smaller than what you are used to " eating.  · Check the source of the calories. Make sure the food you are eating is high in vitamins and protein and low in saturated and trans fats.  Shopping   · Read nutrition labels while you shop. This will help you make healthy decisions before you decide to purchase your food.  · Make a grocery list and stick to it.  Cooking   · Try to cook your favorite foods in a healthier way. For example, try baking instead of frying.  · Use low-fat dairy products.  Meal planning   · Use more fruits and vegetables. Half of your plate should be fruits and vegetables.  · Include lean proteins like poultry and fish.  How do I count calories when eating out?  · Ask for smaller portion sizes.  · Consider sharing an entree and sides instead of getting your own entree.  · If you get your own entree, eat only half. Ask for a box at the beginning of your meal and put the rest of your entree in it so you are not tempted to eat it.  · If calories are listed on the menu, choose the lower calorie options.  · Choose dishes that include vegetables, fruits, whole grains, low-fat dairy products, and lean protein.  · Choose items that are boiled, broiled, grilled, or steamed. Stay away from items that are buttered, battered, fried, or served with cream sauce. Items labeled “crispy” are usually fried, unless stated otherwise.  · Choose water, low-fat milk, unsweetened iced tea, or other drinks without added sugar. If you want an alcoholic beverage, choose a lower calorie option such as a glass of wine or light beer.  · Ask for dressings, sauces, and syrups on the side. These are usually high in calories, so you should limit the amount you eat.  · If you want a salad, choose a garden salad and ask for grilled meats. Avoid extra toppings like soto, cheese, or fried items. Ask for the dressing on the side, or ask for olive oil and vinegar or lemon to use as dressing.  · Estimate how many servings of a food you are given. For example, a serving  of cooked rice is ½ cup or about the size of half a baseball. Knowing serving sizes will help you be aware of how much food you are eating at restaurants. The list below tells you how big or small some common portion sizes are based on everyday objects:  ¨ 1 oz--4 stacked dice.  ¨ 3 oz--1 deck of cards.  ¨ 1 tsp--1 die.  ¨ 1 Tbsp--½ a ping-pong ball.  ¨ 2 Tbsp--1 ping-pong ball.  ¨ ½ cup--½ baseball.  ¨ 1 cup--1 baseball.  Summary  · Calorie counting means keeping track of how many calories you eat and drink each day. If you eat fewer calories than your body needs, you should lose weight.  · A healthy amount of weight to lose per week is usually 1-2 lb (0.5-0.9 kg). This usually means reducing your daily calorie intake by 500-750 calories.  · The number of calories in a food can be found on a Nutrition Facts label. If a food does not have a Nutrition Facts label, try to look up the calories online or ask your dietitian for help.  · Use your calories on foods and drinks that will fill you up, and not on foods and drinks that will leave you hungry.  · Use smaller plates, glasses, and bowls to prevent overeating.  This information is not intended to replace advice given to you by your health care provider. Make sure you discuss any questions you have with your health care provider.  Document Released: 12/18/2006 Document Revised: 11/17/2017 Document Reviewed: 11/17/2017  [employer] Patient Education © 2017 [employer]    Exercising to Lose Weight  Exercising can help you to lose weight. In order to lose weight through exercise, you need to do vigorous-intensity exercise. You can tell that you are exercising with vigorous intensity if you are breathing very hard and fast and cannot hold a conversation while exercising.  Moderate-intensity exercise helps to maintain your current weight. You can tell that you are exercising at a moderate level if you have a higher heart rate and faster breathing, but you are  still able to hold a conversation.  How often should I exercise?  Choose an activity that you enjoy and set realistic goals. Your health care provider can help you to make an activity plan that works for you. Exercise regularly as directed by your health care provider. This may include:  · Doing resistance training twice each week, such as:  ¨ Push-ups.  ¨ Sit-ups.  ¨ Lifting weights.  ¨ Using resistance bands.  · Doing a given intensity of exercise for a given amount of time. Choose from these options:  ¨ 150 minutes of moderate-intensity exercise every week.  ¨ 75 minutes of vigorous-intensity exercise every week.  ¨ A mix of moderate-intensity and vigorous-intensity exercise every week.  Children, pregnant women, people who are out of shape, people who are overweight, and older adults may need to consult a health care provider for individual recommendations. If you have any sort of medical condition, be sure to consult your health care provider before starting a new exercise program.  What are some activities that can help me to lose weight?  · Walking at a rate of at least 4.5 miles an hour.  · Jogging or running at a rate of 5 miles per hour.  · Biking at a rate of at least 10 miles per hour.  · Lap swimming.  · Roller-skating or in-line skating.  · Cross-country skiing.  · Vigorous competitive sports, such as football, basketball, and soccer.  · Jumping rope.  · Aerobic dancing.  How can I be more active in my day-to-day activities?  · Use the stairs instead of the elevator.  · Take a walk during your lunch break.  · If you drive, park your car farther away from work or school.  · If you take public transportation, get off one stop early and walk the rest of the way.  · Make all of your phone calls while standing up and walking around.  · Get up, stretch, and walk around every 30 minutes throughout the day.  What guidelines should I follow while exercising?  · Do not exercise so much that you hurt yourself,  feel dizzy, or get very short of breath.  · Consult your health care provider prior to starting a new exercise program.  · Wear comfortable clothes and shoes with good support.  · Drink plenty of water while you exercise to prevent dehydration or heat stroke. Body water is lost during exercise and must be replaced.  · Work out until you breathe faster and your heart beats faster.  This information is not intended to replace advice given to you by your health care provider. Make sure you discuss any questions you have with your health care provider.  Document Released: 01/20/2012 Document Revised: 05/25/2017 Document Reviewed: 05/21/2015  [employer] Patient Education © 2017 [employer]

## 2018-04-24 ENCOUNTER — OFFICE VISIT (OUTPATIENT)
Dept: OBSTETRICS AND GYNECOLOGY | Facility: CLINIC | Age: 23
End: 2018-04-24

## 2018-04-24 VITALS
RESPIRATION RATE: 16 BRPM | HEIGHT: 67 IN | HEART RATE: 110 BPM | WEIGHT: 275.2 LBS | BODY MASS INDEX: 43.19 KG/M2 | SYSTOLIC BLOOD PRESSURE: 128 MMHG | DIASTOLIC BLOOD PRESSURE: 80 MMHG

## 2018-04-24 DIAGNOSIS — E28.2 PCOS (POLYCYSTIC OVARIAN SYNDROME): Primary | ICD-10-CM

## 2018-04-24 DIAGNOSIS — L68.0 HIRSUTISM: ICD-10-CM

## 2018-04-24 DIAGNOSIS — N92.6 MISSED PERIOD: ICD-10-CM

## 2018-04-24 DIAGNOSIS — Z30.011 OCP (ORAL CONTRACEPTIVE PILLS) INITIATION: ICD-10-CM

## 2018-04-24 LAB
B-HCG UR QL: NEGATIVE
INTERNAL NEGATIVE CONTROL: NEGATIVE
INTERNAL POSITIVE CONTROL: POSITIVE
Lab: NORMAL

## 2018-04-24 PROCEDURE — 81025 URINE PREGNANCY TEST: CPT | Performed by: NURSE PRACTITIONER

## 2018-04-24 PROCEDURE — 99213 OFFICE O/P EST LOW 20 MIN: CPT | Performed by: NURSE PRACTITIONER

## 2018-04-24 RX ORDER — DROSPIRENONE AND ETHINYL ESTRADIOL 0.02-3(28)
1 KIT ORAL DAILY
Qty: 28 TABLET | Refills: 12 | Status: SHIPPED | OUTPATIENT
Start: 2018-04-24 | End: 2018-05-22

## 2018-04-24 RX ORDER — PROPRANOLOL HYDROCHLORIDE 80 MG/1
CAPSULE, EXTENDED RELEASE ORAL
Refills: 5 | COMMUNITY
Start: 2018-03-15 | End: 2018-09-26

## 2018-04-24 RX ORDER — SPIRONOLACTONE 50 MG/1
50 TABLET, FILM COATED ORAL DAILY
Qty: 30 TABLET | Refills: 5 | Status: SHIPPED | OUTPATIENT
Start: 2018-04-24 | End: 2018-09-26

## 2018-04-25 NOTE — PROGRESS NOTES
Subjective   Alka Albert is a 22 y.o. female.     History of Present Illness   Pt presents to discuss her PCOS symptoms. She was diagnosed some time ago and has been pursuing fertility measures for a while. She has completed 6+ rounds of clomid and has taken a break the last couple of months. Not preventing pregnancy but not trying to conceive either. She is concerned about hirsutism of the chin and jaw line. Her periods continue to be irregular, she believes she is late for her period now. UPT negative in office today.     Pt wishes to take a break completely from trying to conceive and focus on flipping a house with her , as well as lose weight.     The following portions of the patient's history were reviewed and updated as appropriate: allergies, current medications, past family history, past medical history, past social history, past surgical history and problem list.    Review of Systems   Constitutional: Positive for unexpected weight gain. Negative for chills and fever.   Respiratory: Negative.    Cardiovascular: Negative.    Endocrine: Negative for cold intolerance and heat intolerance.   Genitourinary: Positive for menstrual problem. Negative for pelvic pain.   Skin:        hirsutism   Neurological: Positive for headaches (migraine without aura, takes propranolol ). Negative for dizziness, syncope and light-headedness.   Psychiatric/Behavioral:        Frustrated with infertility       Objective    Vitals:    04/24/18 1132   BP: 128/80   Pulse: 110   Resp: 16     1    04/24/18  1132   Weight: 125 kg (275 lb 3.2 oz)     Body mass index is 43.1 kg/m².    Physical Exam   Constitutional: She is oriented to person, place, and time. She appears well-developed and well-nourished. She is obese.  Cardiovascular: Normal rate, regular rhythm and normal heart sounds.    Pulmonary/Chest: Effort normal and breath sounds normal.   Neurological: She is alert and oriented to person, place, and time.   Skin:    Difficult to assess due to hair removal but noted coarse hair growth under the chin and jaw   Psychiatric: She has a normal mood and affect. Her behavior is normal.   Vitals reviewed.    Assessment/Plan   Alka was seen today for hirsutism.    Diagnoses and all orders for this visit:    PCOS (polycystic ovarian syndrome)  -     drospirenone-ethinyl estradiol (LEWIS,GIANVI) 3-0.02 MG per tablet; Take 1 tablet by mouth Daily.    Hirsutism  -     spironolactone (ALDACTONE) 50 MG tablet; Take 1 tablet by mouth Daily.  -     drospirenone-ethinyl estradiol (LEWIS,GIANVI) 3-0.02 MG per tablet; Take 1 tablet by mouth Daily.    Missed period  -     POC Pregnancy, Urine    OCP (oral contraceptive pills) initiation  -     drospirenone-ethinyl estradiol (LEWIS,GIANVI) 3-0.02 MG per tablet; Take 1 tablet by mouth Daily.    Discussed options and risks of taking both spironolactone and phentermine should pt continue to not use protect and become pregnant. She verbalizes understanding and wishes to begin OCP for both contraception and regulation of periods. She has not taken OCPs in many years. Pt also agrees to spironolactone for hair changes. Begin 50mg daily. Educated on realistic timeframe of improvement of symptoms. Continue metformin 1000mg daily.     She was instructed how to start her birth control.  It should be started today with negative UPT.  Because it may take 1 month to become effective, the use of alternative contraception for one month was stressed.  The potential for breakthrough bleeding for up to 3 cycles was also emphasized. Discussed what to do if 1 and 2 or more days of pills are missed. Mild side effects and ACHES warning signs discussed.  Patient verbalizes understanding. All questions were answered.     Follow up in 6 months or sooner PRN. Should pt want to pursue more infertility measures, I recommend referral to reproductive endocrinology.

## 2018-06-22 NOTE — PROGRESS NOTES
Pt's  had semen analysis. Morphology and count is off. I recommend with pt's PCOS and his concerns that she go ahead and see reproductive endocrinology. Pt agrees with plan of care. I will refer to Dr. Siegel in [place] for specialty services.

## 2018-07-25 ENCOUNTER — LAB (OUTPATIENT)
Dept: LAB | Facility: OTHER | Age: 23
End: 2018-07-25

## 2018-07-25 ENCOUNTER — OFFICE VISIT (OUTPATIENT)
Dept: FAMILY MEDICINE CLINIC | Facility: CLINIC | Age: 23
End: 2018-07-25

## 2018-07-25 VITALS
BODY MASS INDEX: 43.95 KG/M2 | SYSTOLIC BLOOD PRESSURE: 136 MMHG | DIASTOLIC BLOOD PRESSURE: 86 MMHG | OXYGEN SATURATION: 99 % | HEIGHT: 67 IN | WEIGHT: 280 LBS | HEART RATE: 101 BPM

## 2018-07-25 DIAGNOSIS — Z71.3 DIETARY COUNSELING AND SURVEILLANCE: ICD-10-CM

## 2018-07-25 DIAGNOSIS — Z13.1 SCREENING FOR DIABETES MELLITUS: ICD-10-CM

## 2018-07-25 DIAGNOSIS — IMO0001 CLASS 3 OBESITY DUE TO EXCESS CALORIES WITHOUT SERIOUS COMORBIDITY WITH BODY MASS INDEX (BMI) OF 40.0 TO 44.9 IN ADULT: Primary | ICD-10-CM

## 2018-07-25 LAB
ALBUMIN SERPL-MCNC: 4.2 G/DL (ref 3.5–5)
ALBUMIN/GLOB SERPL: 1.4 G/DL (ref 1.1–1.8)
ALP SERPL-CCNC: 50 U/L (ref 38–126)
ALT SERPL W P-5'-P-CCNC: 20 U/L
ANION GAP SERPL CALCULATED.3IONS-SCNC: 11 MMOL/L (ref 5–15)
AST SERPL-CCNC: 21 U/L (ref 14–36)
BASOPHILS # BLD AUTO: 0 10*3/MM3 (ref 0–0.2)
BASOPHILS NFR BLD AUTO: 0 % (ref 0–2)
BILIRUB SERPL-MCNC: 0.4 MG/DL (ref 0.2–1.3)
BUN BLD-MCNC: 11 MG/DL (ref 7–17)
BUN/CREAT SERPL: 15.1 (ref 7–25)
CALCIUM SPEC-SCNC: 9.3 MG/DL (ref 8.4–10.2)
CHLORIDE SERPL-SCNC: 100 MMOL/L (ref 98–107)
CO2 SERPL-SCNC: 29 MMOL/L (ref 22–30)
CREAT BLD-MCNC: 0.73 MG/DL (ref 0.52–1.04)
DEPRECATED RDW RBC AUTO: 43 FL (ref 36.4–46.3)
EOSINOPHIL # BLD AUTO: 0.11 10*3/MM3 (ref 0–0.7)
EOSINOPHIL NFR BLD AUTO: 1.3 % (ref 0–7)
ERYTHROCYTE [DISTWIDTH] IN BLOOD BY AUTOMATED COUNT: 13.2 % (ref 11.5–14.5)
GFR SERPL CREATININE-BSD FRML MDRD: 99 ML/MIN/1.73 (ref 71–165)
GLOBULIN UR ELPH-MCNC: 3 GM/DL (ref 2.3–3.5)
GLUCOSE BLD-MCNC: 93 MG/DL (ref 74–99)
HBA1C MFR BLD: 5.6 % (ref 4–5.6)
HCT VFR BLD AUTO: 40.2 % (ref 35–45)
HGB BLD-MCNC: 13.1 G/DL (ref 12–15.5)
LYMPHOCYTES # BLD AUTO: 1.86 10*3/MM3 (ref 0.6–4.2)
LYMPHOCYTES NFR BLD AUTO: 22.6 % (ref 10–50)
MCH RBC QN AUTO: 30.2 PG (ref 26.5–34)
MCHC RBC AUTO-ENTMCNC: 32.6 G/DL (ref 31.4–36)
MCV RBC AUTO: 92.6 FL (ref 80–98)
MONOCYTES # BLD AUTO: 0.74 10*3/MM3 (ref 0–0.9)
MONOCYTES NFR BLD AUTO: 9 % (ref 0–12)
NEUTROPHILS # BLD AUTO: 5.52 10*3/MM3 (ref 2–8.6)
NEUTROPHILS NFR BLD AUTO: 67.1 % (ref 37–80)
PLATELET # BLD AUTO: 279 10*3/MM3 (ref 150–450)
PMV BLD AUTO: 10.4 FL (ref 8–12)
POTASSIUM BLD-SCNC: 4.3 MMOL/L (ref 3.4–5)
PROT SERPL-MCNC: 7.2 G/DL (ref 6.3–8.2)
RBC # BLD AUTO: 4.34 10*6/MM3 (ref 3.77–5.16)
SODIUM BLD-SCNC: 140 MMOL/L (ref 137–145)
WBC NRBC COR # BLD: 8.23 10*3/MM3 (ref 3.2–9.8)

## 2018-07-25 PROCEDURE — 85025 COMPLETE CBC W/AUTO DIFF WBC: CPT | Performed by: FAMILY MEDICINE

## 2018-07-25 PROCEDURE — 80053 COMPREHEN METABOLIC PANEL: CPT | Performed by: FAMILY MEDICINE

## 2018-07-25 PROCEDURE — 99213 OFFICE O/P EST LOW 20 MIN: CPT | Performed by: FAMILY MEDICINE

## 2018-07-25 PROCEDURE — 83036 HEMOGLOBIN GLYCOSYLATED A1C: CPT | Performed by: FAMILY MEDICINE

## 2018-07-25 PROCEDURE — 83525 ASSAY OF INSULIN: CPT | Performed by: FAMILY MEDICINE

## 2018-07-25 PROCEDURE — 36415 COLL VENOUS BLD VENIPUNCTURE: CPT | Performed by: FAMILY MEDICINE

## 2018-07-25 RX ORDER — PHENTERMINE HYDROCHLORIDE 37.5 MG/1
37.5 CAPSULE ORAL EVERY MORNING
Qty: 90 CAPSULE | Refills: 0 | Status: SHIPPED | OUTPATIENT
Start: 2018-07-25 | End: 2018-09-26 | Stop reason: SDUPTHER

## 2018-07-25 NOTE — PROGRESS NOTES
"Subjective   Chief Complaint   Patient presents with   • Weight Check     Alka Albert is a 23 y.o. female.   Weight Check    History of Present Illness     Presents today to further discuss weight loss  She desires pregnancy and understands that her weight may contribute to her infertility issues  She previously used phentermine in the past with success but ended up voluntarily stopping the medication to try to get pregnant  Today she is disappointment because she has gained weight  Current weight is 280 pounds and BMI 43  She is interested in other diet options and restarting phentermine  She is due for lab work    The following portions of the patient's history were reviewed and updated as appropriate: allergies, current medications, past family history, past medical history, past social history, past surgical history and problem list.    Review of Systems   Constitutional: Negative for appetite change, chills, fatigue and fever.   HENT: Negative for congestion, ear pain, rhinorrhea and sore throat.    Eyes: Negative for pain.   Respiratory: Negative for cough and shortness of breath.    Cardiovascular: Negative for chest pain and palpitations.   Gastrointestinal: Negative for abdominal pain, constipation, diarrhea and nausea.   Genitourinary: Negative for dysuria.   Musculoskeletal: Negative for back pain, joint swelling and neck pain.   Skin: Negative for rash.   Neurological: Negative for dizziness and headaches.       Objective   /86   Pulse 101   Ht 170.2 cm (67.01\")   Wt 127 kg (280 lb)   SpO2 99%   BMI 43.84 kg/m²   Physical Exam   Constitutional: She is oriented to person, place, and time. She appears well-developed and well-nourished.   HENT:   Head: Normocephalic and atraumatic.   Eyes: Pupils are equal, round, and reactive to light.   Neck: Normal range of motion. Neck supple.   Cardiovascular: Normal rate, regular rhythm and normal heart sounds.    Pulmonary/Chest: Effort normal and " breath sounds normal. No respiratory distress. She has no wheezes. She has no rales.   Abdominal: Soft. Bowel sounds are normal.   Central obesity   Musculoskeletal: Normal range of motion.   Neurological: She is alert and oriented to person, place, and time.   Skin: Skin is warm and dry.   Psychiatric: She has a normal mood and affect.   Nursing note and vitals reviewed.      Assessment/Plan   Problems Addressed this Visit     None      Visit Diagnoses     Class 3 obesity due to excess calories without serious comorbidity with body mass index (BMI) of 40.0 to 44.9 in adult (CMS/[facility])    -  Primary    Screening for diabetes mellitus        Relevant Orders    CBC & Differential    Comprehensive Metabolic Panel    Insulin, Total    Hemoglobin A1c    Dietary counseling and surveillance            Patient's Body mass index is 43.84 kg/m². BMI is above normal parameters. Recommendations include: exercise counseling, nutrition counseling and pharmacological intervention.    Restart phentermine  The patient has read and signed the [facility] Controlled Substance Contract.  I will continue to see patient for regular follow up appointments.  They are well controlled on their medication.  RA is updated every 3 months. The patient is aware of the potential for addiction and dependence.  Discussed side effects with patient  Cautioned on use    Labs ordered    Consider victoza if hyperinsulinemia or diabetic    Recheck in 4 weeks

## 2018-07-27 LAB — INSULIN SERPL-ACNC: 29.2 UIU/ML (ref 2.6–24.9)

## 2018-08-20 RX ORDER — OFLOXACIN 3 MG/ML
5 SOLUTION AURICULAR (OTIC) DAILY
Qty: 5 ML | Refills: 0 | Status: SHIPPED | OUTPATIENT
Start: 2018-08-20 | End: 2018-09-26

## 2018-09-26 ENCOUNTER — OFFICE VISIT (OUTPATIENT)
Dept: FAMILY MEDICINE CLINIC | Facility: CLINIC | Age: 23
End: 2018-09-26

## 2018-09-26 VITALS
BODY MASS INDEX: 41.59 KG/M2 | WEIGHT: 265 LBS | SYSTOLIC BLOOD PRESSURE: 140 MMHG | HEART RATE: 126 BPM | DIASTOLIC BLOOD PRESSURE: 90 MMHG | HEIGHT: 67 IN

## 2018-09-26 DIAGNOSIS — E16.1 HYPERINSULINEMIA: ICD-10-CM

## 2018-09-26 DIAGNOSIS — IMO0001 CLASS 3 OBESITY DUE TO EXCESS CALORIES WITHOUT SERIOUS COMORBIDITY WITH BODY MASS INDEX (BMI) OF 40.0 TO 44.9 IN ADULT: Primary | ICD-10-CM

## 2018-09-26 DIAGNOSIS — Z71.3 DIETARY COUNSELING AND SURVEILLANCE: ICD-10-CM

## 2018-09-26 PROCEDURE — 99213 OFFICE O/P EST LOW 20 MIN: CPT | Performed by: FAMILY MEDICINE

## 2018-09-26 RX ORDER — PHENTERMINE HYDROCHLORIDE 37.5 MG/1
37.5 CAPSULE ORAL EVERY MORNING
Qty: 30 CAPSULE | Refills: 0 | Status: SHIPPED | OUTPATIENT
Start: 2018-09-26 | End: 2019-01-04

## 2018-09-26 NOTE — PROGRESS NOTES
"Subjective   Chief Complaint   Patient presents with   • Weight Check     Alka Albert is a 23 y.o. female.   Weight Check    History of Present Illness     Presents today for a weight check  Is currently using phentermine and victoza  She admits to running out of her victoza last week  Doing well with medications and due for refills today  victoza was added to help with weight loss and hyperinsulinemia  She desires pregnancy and understands that her weight may contribute to her infertility issues  Current weight is 265lbs, BMI 41  She has lost 15lbs since her last appointment  Denies any concerns or new issues  Working on a restricted diet and added more physical exercise to help with weight loss    The following portions of the patient's history were reviewed and updated as appropriate: allergies, current medications, past family history, past medical history, past social history, past surgical history and problem list.    Review of Systems   Constitutional: Negative for appetite change, chills, fatigue and fever.   HENT: Negative for congestion, ear pain, rhinorrhea and sore throat.    Eyes: Negative for pain.   Respiratory: Negative for cough and shortness of breath.    Cardiovascular: Negative for chest pain and palpitations.   Gastrointestinal: Negative for abdominal pain, constipation, diarrhea and nausea.   Genitourinary: Negative for dysuria.   Musculoskeletal: Negative for back pain, joint swelling and neck pain.   Skin: Negative for rash.   Neurological: Negative for dizziness and headaches.       Objective   /90   Pulse (!) 126   Ht 170.2 cm (67\")   Wt 120 kg (265 lb)   BMI 41.50 kg/m²   Physical Exam   Constitutional: She is oriented to person, place, and time. She appears well-developed and well-nourished.   HENT:   Head: Normocephalic and atraumatic.   Eyes: Pupils are equal, round, and reactive to light.   Neck: Normal range of motion. Neck supple.   Cardiovascular: Regular rhythm and " normal heart sounds.  Tachycardia present.    Pulmonary/Chest: Effort normal and breath sounds normal. No respiratory distress. She has no wheezes. She has no rales.   Abdominal: Soft. Bowel sounds are normal.   Musculoskeletal: Normal range of motion.   Neurological: She is alert and oriented to person, place, and time.   Skin: Skin is warm and dry.   Psychiatric: She has a normal mood and affect.   Nursing note and vitals reviewed.      Assessment/Plan   Problems Addressed this Visit        Endocrine    Hyperinsulinemia      Other Visit Diagnoses     Class 3 obesity due to excess calories without serious comorbidity with body mass index (BMI) of 40.0 to 44.9 in adult (CMS/[facility])    -  Primary    Dietary counseling and surveillance            The patient has read and signed the [facility] Controlled Substance Contract.  I will continue to see patient for regular follow up appointments.  They are well controlled on their medication.  RA is updated every 3 months. The patient is aware of the potential for addiction and dependence.  Refilled phentermine and victoza    Reviewed lab results    Monitor HR and blood pressure    Recheck in 4 weeks

## 2018-10-23 ENCOUNTER — OFFICE VISIT (OUTPATIENT)
Dept: FAMILY MEDICINE CLINIC | Facility: CLINIC | Age: 23
End: 2018-10-23

## 2018-10-23 DIAGNOSIS — M54.42 ACUTE LEFT-SIDED LOW BACK PAIN WITH LEFT-SIDED SCIATICA: Primary | ICD-10-CM

## 2018-10-23 PROCEDURE — 99213 OFFICE O/P EST LOW 20 MIN: CPT | Performed by: NURSE PRACTITIONER

## 2018-10-23 RX ORDER — TIZANIDINE HYDROCHLORIDE 4 MG/1
4 CAPSULE, GELATIN COATED ORAL 3 TIMES DAILY
Qty: 60 CAPSULE | Refills: 0 | Status: SHIPPED | OUTPATIENT
Start: 2018-10-23 | End: 2019-01-04

## 2018-10-23 RX ORDER — IBUPROFEN 800 MG/1
800 TABLET ORAL EVERY 6 HOURS PRN
Qty: 90 TABLET | Refills: 0 | Status: SHIPPED | OUTPATIENT
Start: 2018-10-23 | End: 2019-01-04

## 2018-10-23 RX ORDER — TRIAMCINOLONE ACETONIDE 40 MG/ML
80 INJECTION, SUSPENSION INTRA-ARTICULAR; INTRAMUSCULAR ONCE
Status: DISCONTINUED | OUTPATIENT
Start: 2018-10-23 | End: 2019-01-18

## 2018-10-23 RX ORDER — KETOROLAC TROMETHAMINE 30 MG/ML
30 INJECTION, SOLUTION INTRAMUSCULAR; INTRAVENOUS ONCE
Status: SHIPPED | OUTPATIENT
Start: 2018-10-23 | End: 2018-10-28

## 2018-10-23 RX ORDER — METAXALONE 800 MG/1
800 TABLET ORAL 3 TIMES DAILY PRN
Qty: 90 TABLET | Refills: 0 | Status: SHIPPED | OUTPATIENT
Start: 2018-10-23 | End: 2019-01-04

## 2018-10-23 NOTE — PROGRESS NOTES
Subjective   Alka Albert is a 23 y.o. female. Patient here today with complaints of No chief complaint on file.  pt here today with complaints of lower back pain, L sided mainly with radiculopathy down L buttock. Worsens with position changes.     There were no vitals filed for this visit.  Past Medical History:   Diagnosis Date   • Infertility, female    • PCOS (polycystic ovarian syndrome)      Back Pain   This is a new problem. The current episode started in the past 7 days. The problem occurs constantly. The problem has been gradually worsening since onset. The pain is present in the lumbar spine and gluteal. The quality of the pain is described as aching. The pain does not radiate. The pain is at a severity of 5/10. The pain is moderate. The symptoms are aggravated by bending, position, twisting, standing and sitting. Stiffness is present all day. She has tried bed rest for the symptoms. The treatment provided no relief.        The following portions of the patient's history were reviewed and updated as appropriate: allergies, current medications, past family history, past medical history, past social history, past surgical history and problem list.    Review of Systems   Constitutional: Negative.    HENT: Negative.    Eyes: Negative.    Respiratory: Negative.    Cardiovascular: Negative.    Gastrointestinal: Negative.    Endocrine: Negative.    Genitourinary: Negative.    Musculoskeletal: Positive for back pain and gait problem.   Skin: Negative.    Allergic/Immunologic: Negative.    Hematological: Negative.    Psychiatric/Behavioral: Negative.        Objective   Physical Exam   Constitutional: She is oriented to person, place, and time. She appears well-developed and well-nourished. No distress.   HENT:   Head: Normocephalic and atraumatic.   Cardiovascular: Normal rate.    Pulmonary/Chest: Effort normal.   Musculoskeletal: She exhibits tenderness.        Lumbar back: She exhibits decreased range of  motion, tenderness and bony tenderness.        Back:    Neurological: She is alert and oriented to person, place, and time.   Skin: Skin is warm and dry. She is not diaphoretic.   Nursing note and vitals reviewed.      Assessment/Plan   Diagnoses and all orders for this visit:    Acute left-sided low back pain with left-sided sciatica  -     triamcinolone acetonide (KENALOG-40) injection 80 mg; Inject 2 mL into the appropriate muscle as directed by prescriber 1 (One) Time.  -     ketorolac (TORADOL) injection 30 mg; Infuse 1 mL into a venous catheter 1 (One) Time.    Other orders  -     ibuprofen (ADVIL,MOTRIN) 800 MG tablet; Take 1 tablet by mouth Every 6 (Six) Hours As Needed for Mild Pain .  -     metaxalone (SKELAXIN) 800 MG tablet; Take 1 tablet by mouth 3 (Three) Times a Day As Needed for Muscle Spasms.    she is given toradol 30mg and kenalog 80mg inj IM in office today, advised to rest, is given advil 800mg tid prn pain and skelaxin as above. Should symptoms persist or worsen she is to RTC for recheck. She is aware and is in agreement to this plan. All questions and concerns are addressed with understanding noted.

## 2018-10-24 DIAGNOSIS — M54.5 ACUTE LOW BACK PAIN, UNSPECIFIED BACK PAIN LATERALITY, WITH SCIATICA PRESENCE UNSPECIFIED: Primary | ICD-10-CM

## 2018-11-16 RX ORDER — DICYCLOMINE HYDROCHLORIDE 10 MG/1
10 CAPSULE ORAL
Qty: 120 CAPSULE | Refills: 5 | Status: SHIPPED | OUTPATIENT
Start: 2018-11-16 | End: 2019-12-19

## 2018-12-27 ENCOUNTER — LAB (OUTPATIENT)
Dept: LAB | Facility: OTHER | Age: 23
End: 2018-12-27

## 2018-12-27 DIAGNOSIS — R11.0 NAUSEA: ICD-10-CM

## 2018-12-27 DIAGNOSIS — R11.0 NAUSEA: Primary | ICD-10-CM

## 2018-12-27 LAB — HCG SERPL QL: NEGATIVE

## 2018-12-27 PROCEDURE — 84703 CHORIONIC GONADOTROPIN ASSAY: CPT | Performed by: PHYSICIAN ASSISTANT

## 2018-12-27 PROCEDURE — 36415 COLL VENOUS BLD VENIPUNCTURE: CPT | Performed by: PHYSICIAN ASSISTANT

## 2019-01-04 ENCOUNTER — OFFICE VISIT (OUTPATIENT)
Dept: FAMILY MEDICINE CLINIC | Facility: CLINIC | Age: 24
End: 2019-01-04

## 2019-01-04 VITALS
OXYGEN SATURATION: 99 % | SYSTOLIC BLOOD PRESSURE: 140 MMHG | HEIGHT: 67 IN | HEART RATE: 97 BPM | TEMPERATURE: 97.1 F | DIASTOLIC BLOOD PRESSURE: 102 MMHG | BODY MASS INDEX: 42.06 KG/M2 | WEIGHT: 268 LBS | RESPIRATION RATE: 16 BRPM

## 2019-01-04 DIAGNOSIS — I10 ESSENTIAL HYPERTENSION: Primary | Chronic | ICD-10-CM

## 2019-01-04 PROBLEM — E16.1 HYPERINSULINEMIA: Chronic | Status: ACTIVE | Noted: 2018-09-26

## 2019-01-04 PROCEDURE — 99213 OFFICE O/P EST LOW 20 MIN: CPT | Performed by: NURSE PRACTITIONER

## 2019-01-04 RX ORDER — LABETALOL 100 MG/1
50 TABLET, FILM COATED ORAL 2 TIMES DAILY
Qty: 30 TABLET | Refills: 6 | Status: SHIPPED | OUTPATIENT
Start: 2019-01-04 | End: 2019-03-22

## 2019-01-04 NOTE — PROGRESS NOTES
"Chief Complaint   Patient presents with   • Hypertension   • Weight Loss     Subjective   Alka Albert is a 23 y.o. female who presents to the office for hypertension with facial flushing and some \"weird\" feeling. Denies shortness of breath or chest pain. She is not on any antihypertensives. She is not using any birth control method at this time so I will begin labetalol 50mg po BID and recheck Monday.     The following portions of the patient's history were reviewed and updated as appropriate: allergies, current medications, past family history, past medical history, past social history, past surgical history and problem list.    History of Present Illness     Past Medical History:   Diagnosis Date   • Infertility, female    • PCOS (polycystic ovarian syndrome)           Family History   Problem Relation Age of Onset   • No Known Problems Mother    • No Known Problems Father         Review of Systems   Constitutional: Negative.  Negative for appetite change, chills, fatigue, fever and unexpected weight change.   HENT: Negative.  Negative for congestion, ear pain, rhinorrhea and sore throat.    Eyes: Negative.  Negative for pain.   Respiratory: Negative.  Negative for cough, chest tightness and shortness of breath.    Cardiovascular: Negative.  Negative for chest pain and palpitations.   Gastrointestinal: Negative.  Negative for abdominal pain, constipation and nausea.   Endocrine: Negative.    Genitourinary: Negative.  Negative for dysuria.   Musculoskeletal: Negative.  Negative for back pain, joint swelling and neck pain.   Skin: Negative.  Negative for color change, pallor, rash and wound.   Allergic/Immunologic: Negative.    Neurological: Negative.  Negative for dizziness and headaches.   Hematological: Negative.    Psychiatric/Behavioral: Negative.  Negative for sleep disturbance and suicidal ideas.       Objective   Vitals:    01/04/19 1021   BP: (!) 140/102   BP Location: Left arm   Patient Position: " "Sitting   Cuff Size: Adult   Pulse: 97   Resp: 16   Temp: 97.1 °F (36.2 °C)   TempSrc: Oral   SpO2: 99%   Weight: 122 kg (268 lb)   Height: 170.2 cm (67\")   PainSc: 0-No pain     Physical Exam   Constitutional: She is oriented to person, place, and time. She appears well-developed and well-nourished. No distress.   HENT:   Head: Normocephalic and atraumatic.   Mouth/Throat: No oropharyngeal exudate.   Eyes: Conjunctivae are normal. Pupils are equal, round, and reactive to light.   Neck: Normal range of motion. Neck supple. No thyromegaly present.   Cardiovascular: Normal rate, regular rhythm, normal heart sounds and intact distal pulses. Exam reveals no gallop and no friction rub.   No murmur heard.  Pulmonary/Chest: Effort normal and breath sounds normal. No respiratory distress. She has no wheezes. She has no rales. She exhibits no tenderness.   Abdominal: Soft. Bowel sounds are normal.   Central obesity   Musculoskeletal: Normal range of motion. She exhibits no edema, tenderness or deformity.   Lymphadenopathy:     She has no cervical adenopathy.   Neurological: She is alert and oriented to person, place, and time. No cranial nerve deficit.   Skin: Skin is warm and dry. Capillary refill takes 2 to 3 seconds. She is not diaphoretic. No erythema. No pallor.   Psychiatric: She has a normal mood and affect. Her behavior is normal. Judgment and thought content normal.   Nursing note and vitals reviewed.      Assessment/Plan   Alka was seen today for hypertension and weight loss.    Diagnoses and all orders for this visit:    Essential hypertension  Comments:  newly diagnosed today, meds today  Orders:  -     labetalol (NORMODYNE) 100 MG tablet; Take 0.5 tablets by mouth 2 (Two) Times a Day.           PHQ-2/PHQ-9 Depression Screening 1/4/2019   Little interest or pleasure in doing things 0   Feeling down, depressed, or hopeless 0   Total Score 0       Crystal L Jacques, APRKATHY         Return in about 3 days (around " 1/7/2019).    Patient Instructions   Recheck blood pressure Monday

## 2019-01-18 ENCOUNTER — LAB (OUTPATIENT)
Dept: LAB | Facility: OTHER | Age: 24
End: 2019-01-18

## 2019-01-18 ENCOUNTER — OFFICE VISIT (OUTPATIENT)
Dept: FAMILY MEDICINE CLINIC | Facility: CLINIC | Age: 24
End: 2019-01-18

## 2019-01-18 VITALS
DIASTOLIC BLOOD PRESSURE: 82 MMHG | BODY MASS INDEX: 41.97 KG/M2 | SYSTOLIC BLOOD PRESSURE: 140 MMHG | HEIGHT: 67 IN | TEMPERATURE: 100.3 F | HEART RATE: 115 BPM

## 2019-01-18 DIAGNOSIS — R11.0 NAUSEA: ICD-10-CM

## 2019-01-18 DIAGNOSIS — R50.9 FEVER, UNSPECIFIED FEVER CAUSE: ICD-10-CM

## 2019-01-18 DIAGNOSIS — J02.9 PHARYNGITIS, UNSPECIFIED ETIOLOGY: Primary | ICD-10-CM

## 2019-01-18 LAB
FLUAV AG NPH QL: NEGATIVE
FLUBV AG NPH QL IA: NEGATIVE
S PYO AG THROAT QL: NEGATIVE

## 2019-01-18 PROCEDURE — 87081 CULTURE SCREEN ONLY: CPT | Performed by: PHYSICIAN ASSISTANT

## 2019-01-18 PROCEDURE — 96372 THER/PROPH/DIAG INJ SC/IM: CPT | Performed by: PHYSICIAN ASSISTANT

## 2019-01-18 PROCEDURE — 87804 INFLUENZA ASSAY W/OPTIC: CPT | Performed by: PHYSICIAN ASSISTANT

## 2019-01-18 PROCEDURE — 99214 OFFICE O/P EST MOD 30 MIN: CPT | Performed by: PHYSICIAN ASSISTANT

## 2019-01-18 PROCEDURE — 87880 STREP A ASSAY W/OPTIC: CPT | Performed by: PHYSICIAN ASSISTANT

## 2019-01-18 RX ORDER — ONDANSETRON 4 MG/1
4 TABLET, ORALLY DISINTEGRATING ORAL EVERY 8 HOURS PRN
Qty: 30 TABLET | Refills: 0 | Status: SHIPPED | OUTPATIENT
Start: 2019-01-18 | End: 2019-03-22

## 2019-01-18 RX ORDER — METHYLPREDNISOLONE ACETATE 80 MG/ML
80 INJECTION, SUSPENSION INTRA-ARTICULAR; INTRALESIONAL; INTRAMUSCULAR; SOFT TISSUE ONCE
Status: COMPLETED | OUTPATIENT
Start: 2019-01-18 | End: 2019-01-18

## 2019-01-18 RX ORDER — ACETAMINOPHEN 325 MG/1
650 TABLET ORAL EVERY 6 HOURS PRN
Status: DISCONTINUED | OUTPATIENT
Start: 2019-01-18 | End: 2019-03-22

## 2019-01-18 RX ORDER — FLUTICASONE PROPIONATE 50 MCG
2 SPRAY, SUSPENSION (ML) NASAL DAILY
Qty: 1 BOTTLE | Refills: 0 | Status: SHIPPED | OUTPATIENT
Start: 2019-01-18 | End: 2019-03-22

## 2019-01-18 RX ORDER — AZITHROMYCIN 250 MG/1
TABLET, FILM COATED ORAL
Qty: 6 TABLET | Refills: 0 | Status: SHIPPED | OUTPATIENT
Start: 2019-01-18 | End: 2019-03-22

## 2019-01-18 RX ORDER — CETIRIZINE HYDROCHLORIDE 10 MG/1
10 TABLET ORAL DAILY
Qty: 30 TABLET | Refills: 0 | Status: SHIPPED | OUTPATIENT
Start: 2019-01-18 | End: 2019-03-22

## 2019-01-18 RX ADMIN — METHYLPREDNISOLONE ACETATE 80 MG: 80 INJECTION, SUSPENSION INTRA-ARTICULAR; INTRALESIONAL; INTRAMUSCULAR; SOFT TISSUE at 08:41

## 2019-01-18 NOTE — PROGRESS NOTES
Subjective   Alka Albert is a 23 y.o. female.   Pt is new to me.  URI    This is a new problem. The current episode started yesterday. The problem has been gradually worsening. The maximum temperature recorded prior to her arrival was 100.4 - 100.9 F. Associated symptoms include congestion, coughing (dry, nonproductive), diarrhea (x1 this AM), headaches (generalized), nausea and a sore throat. Pertinent negatives include no abdominal pain, chest pain, dysuria, ear pain, joint pain, joint swelling, neck pain, plugged ear sensation, rash, rhinorrhea, sinus pain, sneezing, swollen glands, vomiting or wheezing. She has tried decongestant for the symptoms. The treatment provided no relief.   Sore Throat    This is a new problem. The current episode started yesterday. The problem has been gradually worsening. The maximum temperature recorded prior to her arrival was 100.4 - 100.9 F. The patient is experiencing no pain. Associated symptoms include congestion, coughing (dry, nonproductive), diarrhea (x1 this AM) and headaches (generalized). Pertinent negatives include no abdominal pain, drooling, ear discharge, ear pain, hoarse voice, plugged ear sensation, neck pain, shortness of breath, stridor, swollen glands, trouble swallowing or vomiting. She has had exposure to strep. She has had no exposure to mono. She has tried gargles for the symptoms. The treatment provided no relief.      Pt is here today for a sick visit. Pt has felt unwell x yesterday. Pt admits to sore throat, fever (100.3F, this AM), body aches, diarrhea x 1 this AM, dry nonproductive cough, nasal congestion, fatigue.    Denies shortness of breath.     Pt admits to taking nyquil otc x last PM.    The following portions of the patient's history were reviewed and updated as appropriate: allergies, current medications, past family history, past medical history, past social history, past surgical history and problem list.    Review of Systems    Constitutional: Positive for fatigue and fever (Tmax 100.3F). Negative for activity change, appetite change, chills, diaphoresis, unexpected weight gain and unexpected weight loss.   HENT: Positive for congestion, postnasal drip and sore throat. Negative for dental problem, drooling, ear discharge, ear pain, hoarse voice, nosebleeds, rhinorrhea, sinus pressure, sneezing and trouble swallowing.    Eyes: Negative for blurred vision, double vision and visual disturbance.   Respiratory: Positive for cough (dry, nonproductive). Negative for apnea, choking, chest tightness, shortness of breath, wheezing and stridor.    Cardiovascular: Negative for chest pain, palpitations and leg swelling.   Gastrointestinal: Positive for diarrhea (x1 this AM) and nausea. Negative for abdominal distention, abdominal pain, constipation, vomiting, GERD and indigestion.   Genitourinary: Negative for dysuria.   Musculoskeletal: Positive for myalgias. Negative for arthralgias, back pain, joint pain, neck pain, neck stiffness and bursitis.   Skin: Negative.  Negative for rash.   Neurological: Positive for dizziness (mild) and headache (generalized). Negative for weakness, light-headedness and confusion.   Psychiatric/Behavioral: Negative.        Objective   Physical Exam   Constitutional: She is oriented to person, place, and time. She appears well-developed and well-nourished. No distress.   HENT:   Head: Normocephalic and atraumatic.   Right Ear: Hearing, external ear and ear canal normal. No swelling or tenderness. Tympanic membrane is bulging. Tympanic membrane is not erythematous and not retracted. A middle ear effusion (serous) is present. No decreased hearing is noted.   Left Ear: Hearing and external ear normal. No swelling or tenderness. No decreased hearing is noted. An impacted cerumen is present.  Nose: Congestion present. No rhinorrhea. Right sinus exhibits no maxillary sinus tenderness and no frontal sinus tenderness. Left sinus  exhibits no maxillary sinus tenderness and no frontal sinus tenderness.   Mouth/Throat: Uvula is midline and mucous membranes are normal. Posterior oropharyngeal erythema present. No oropharyngeal exudate, posterior oropharyngeal edema or tonsillar abscesses. Tonsils are 2+ on the right. Tonsils are 2+ on the left. No tonsillar exudate.   Eyes: Conjunctivae and EOM are normal. Pupils are equal, round, and reactive to light.   Neck: Normal range of motion. Neck supple.   Cardiovascular: Regular rhythm and normal heart sounds. Exam reveals no gallop and no friction rub.   No murmur heard.  Pulmonary/Chest: Effort normal and breath sounds normal. No stridor. No respiratory distress. She has no wheezes. She has no rales. She exhibits no tenderness.   Abdominal: Soft. Bowel sounds are normal. She exhibits no distension and no mass. There is no tenderness. There is no rebound and no guarding. No hernia.   Musculoskeletal: Normal range of motion.   Lymphadenopathy:     She has no cervical adenopathy.   Neurological: She is alert and oriented to person, place, and time.   Skin: Skin is warm and dry. Capillary refill takes less than 2 seconds. She is not diaphoretic.   Psychiatric: She has a normal mood and affect. Her behavior is normal. Judgment and thought content normal.   Nursing note and vitals reviewed.    Vitals:    01/18/19 0840   BP: 140/82   Pulse: 115   Temp: 100.3 °F (37.9 °C)             Assessment/Plan   Diagnoses and all orders for this visit:    Pharyngitis, unspecified etiology  -     methylPREDNISolone acetate (DEPO-medrol) injection 80 mg; Inject 1 mL into the appropriate muscle as directed by prescriber 1 (One) Time.  -     fluticasone (FLONASE) 50 MCG/ACT nasal spray; 2 sprays into the nostril(s) as directed by provider Daily.  -     cetirizine (zyrTEC) 10 MG tablet; Take 1 tablet by mouth Daily.  -     azithromycin (ZITHROMAX) 250 MG tablet; Take 2 tablets the first day, then 1 tablet daily for 4  days.    Fever, unspecified fever cause  -     Rapid Strep A Screen - Swab, Throat; Future  -     Influenza Antigen, Rapid - Swab, Nasopharynx; Future  -     acetaminophen (TYLENOL) tablet 650 mg; Take 2 tablets by mouth Every 6 (Six) Hours As Needed for Headache or Fever.    Nausea  -     ondansetron ODT (ZOFRAN ODT) 4 MG disintegrating tablet; Take 1 tablet by mouth Every 8 (Eight) Hours As Needed for Nausea or Vomiting.    Discussed with pt results of rapid strep & rapid flu swab in office - negative.     Tylenol 650mg 2 tablets given to pt in office for fever. Advised pt she can alternate taking tylenol/ibuprofen q 6 hours prn for fever/pain.    Depo-medrol, IM injection, as above, given to pt in office for pharyngitis.    Prescription for flonase, zyrtec, zpak, as above, sent to pharmacy for pharyngitis and nasal congestion.    Prescription for zofran, as above, sent to pharmacy for nausea.    Advised pt to perform salt water gargles x2-3 per day for sore throat.    Advised pt to increase fluid intake and perform good hand hygiene.    Patient educated to follow up sooner than next scheduled appointment if symptoms worsen or do not improve. Patient stated understanding and has agreed with plan of care. After visit summary was printed and given to patient.       This document has been electronically signed by Indigo Saab PA-C on January 18, 2019 9:36 AM,.

## 2019-01-21 LAB — BACTERIA SPEC AEROBE CULT: NORMAL

## 2019-03-02 ENCOUNTER — DOCUMENTATION (OUTPATIENT)
Dept: FAMILY MEDICINE CLINIC | Facility: CLINIC | Age: 24
End: 2019-03-02

## 2019-03-02 RX ORDER — OSELTAMIVIR PHOSPHATE 75 MG/1
75 CAPSULE ORAL DAILY
Qty: 10 CAPSULE | Refills: 0 | Status: SHIPPED | OUTPATIENT
Start: 2019-03-02 | End: 2019-03-12

## 2019-03-22 ENCOUNTER — LAB (OUTPATIENT)
Dept: LAB | Facility: OTHER | Age: 24
End: 2019-03-22

## 2019-03-22 ENCOUNTER — OFFICE VISIT (OUTPATIENT)
Dept: FAMILY MEDICINE CLINIC | Facility: CLINIC | Age: 24
End: 2019-03-22

## 2019-03-22 VITALS
SYSTOLIC BLOOD PRESSURE: 124 MMHG | TEMPERATURE: 97.9 F | HEART RATE: 97 BPM | RESPIRATION RATE: 14 BRPM | HEIGHT: 67 IN | OXYGEN SATURATION: 98 % | DIASTOLIC BLOOD PRESSURE: 78 MMHG | WEIGHT: 278 LBS | BODY MASS INDEX: 43.63 KG/M2

## 2019-03-22 DIAGNOSIS — Z13.6 SCREENING FOR HEART DISEASE: ICD-10-CM

## 2019-03-22 DIAGNOSIS — E66.01 CLASS 3 SEVERE OBESITY DUE TO EXCESS CALORIES WITHOUT SERIOUS COMORBIDITY WITH BODY MASS INDEX (BMI) OF 40.0 TO 44.9 IN ADULT (HCC): Chronic | ICD-10-CM

## 2019-03-22 DIAGNOSIS — E16.1 HYPERINSULINEMIA: Primary | Chronic | ICD-10-CM

## 2019-03-22 DIAGNOSIS — E28.2 PCOS (POLYCYSTIC OVARIAN SYNDROME): ICD-10-CM

## 2019-03-22 DIAGNOSIS — Z13.220 SCREENING FOR HYPERLIPIDEMIA: ICD-10-CM

## 2019-03-22 DIAGNOSIS — E16.1 HYPERINSULINEMIA: Chronic | ICD-10-CM

## 2019-03-22 DIAGNOSIS — I10 ESSENTIAL HYPERTENSION: ICD-10-CM

## 2019-03-22 PROBLEM — N97.9 INFERTILITY, FEMALE: Status: ACTIVE | Noted: 2018-02-02

## 2019-03-22 PROBLEM — G43.709 CHRONIC MIGRAINE WITHOUT AURA WITHOUT STATUS MIGRAINOSUS, NOT INTRACTABLE: Status: ACTIVE | Noted: 2018-02-02

## 2019-03-22 LAB
ALBUMIN SERPL-MCNC: 4.2 G/DL (ref 3.5–5)
ALBUMIN/GLOB SERPL: 1.4 G/DL (ref 1.1–1.8)
ALP SERPL-CCNC: 57 U/L (ref 38–126)
ALT SERPL W P-5'-P-CCNC: 23 U/L
ANION GAP SERPL CALCULATED.3IONS-SCNC: 9 MMOL/L (ref 5–15)
AST SERPL-CCNC: 20 U/L (ref 14–36)
BASOPHILS # BLD AUTO: 0.01 10*3/MM3 (ref 0–0.2)
BASOPHILS NFR BLD AUTO: 0.1 % (ref 0–2)
BILIRUB SERPL-MCNC: 0.5 MG/DL (ref 0.2–1.3)
BUN BLD-MCNC: 14 MG/DL (ref 7–17)
BUN/CREAT SERPL: 18.9 (ref 7–25)
CALCIUM SPEC-SCNC: 9 MG/DL (ref 8.4–10.2)
CHLORIDE SERPL-SCNC: 102 MMOL/L (ref 98–107)
CHOLEST SERPL-MCNC: 169 MG/DL (ref 150–200)
CO2 SERPL-SCNC: 27 MMOL/L (ref 22–30)
CREAT BLD-MCNC: 0.74 MG/DL (ref 0.52–1.04)
DEPRECATED RDW RBC AUTO: 41.8 FL (ref 36.4–46.3)
EOSINOPHIL # BLD AUTO: 0.07 10*3/MM3 (ref 0–0.7)
EOSINOPHIL NFR BLD AUTO: 0.8 % (ref 0–7)
ERYTHROCYTE [DISTWIDTH] IN BLOOD BY AUTOMATED COUNT: 12.9 % (ref 11.5–14.5)
GFR SERPL CREATININE-BSD FRML MDRD: 97 ML/MIN/1.73 (ref 71–165)
GLOBULIN UR ELPH-MCNC: 3 GM/DL (ref 2.3–3.5)
GLUCOSE BLD-MCNC: 89 MG/DL (ref 74–99)
HBA1C MFR BLD: 5.5 % (ref 4–5.6)
HCT VFR BLD AUTO: 39 % (ref 35–45)
HDLC SERPL-MCNC: 51 MG/DL (ref 40–59)
HGB BLD-MCNC: 12.7 G/DL (ref 12–15.5)
LDLC SERPL CALC-MCNC: 102 MG/DL
LDLC/HDLC SERPL: 1.99 {RATIO} (ref 0–3.22)
LYMPHOCYTES # BLD AUTO: 1.82 10*3/MM3 (ref 0.6–4.2)
LYMPHOCYTES NFR BLD AUTO: 22.1 % (ref 10–50)
MCH RBC QN AUTO: 29.7 PG (ref 26.5–34)
MCHC RBC AUTO-ENTMCNC: 32.6 G/DL (ref 31.4–36)
MCV RBC AUTO: 91.1 FL (ref 80–98)
MONOCYTES # BLD AUTO: 0.8 10*3/MM3 (ref 0–0.9)
MONOCYTES NFR BLD AUTO: 9.7 % (ref 0–12)
NEUTROPHILS # BLD AUTO: 5.55 10*3/MM3 (ref 2–8.6)
NEUTROPHILS NFR BLD AUTO: 67.3 % (ref 37–80)
PLATELET # BLD AUTO: 257 10*3/MM3 (ref 150–450)
PMV BLD AUTO: 10.3 FL (ref 8–12)
POTASSIUM BLD-SCNC: 4.1 MMOL/L (ref 3.4–5)
PROT SERPL-MCNC: 7.2 G/DL (ref 6.3–8.2)
RBC # BLD AUTO: 4.28 10*6/MM3 (ref 3.77–5.16)
SODIUM BLD-SCNC: 138 MMOL/L (ref 137–145)
T3 SERPL-MCNC: 146 NG/DL (ref 97–169)
T4 FREE SERPL-MCNC: 1.12 NG/DL (ref 0.78–2.19)
TRIGL SERPL-MCNC: 82 MG/DL
TSH SERPL DL<=0.05 MIU/L-ACNC: 1.35 MIU/ML (ref 0.46–4.68)
VLDLC SERPL-MCNC: 16.4 MG/DL
WBC NRBC COR # BLD: 8.25 10*3/MM3 (ref 3.2–9.8)

## 2019-03-22 PROCEDURE — 80061 LIPID PANEL: CPT | Performed by: NURSE PRACTITIONER

## 2019-03-22 PROCEDURE — 36415 COLL VENOUS BLD VENIPUNCTURE: CPT | Performed by: NURSE PRACTITIONER

## 2019-03-22 PROCEDURE — 84439 ASSAY OF FREE THYROXINE: CPT | Performed by: NURSE PRACTITIONER

## 2019-03-22 PROCEDURE — 84443 ASSAY THYROID STIM HORMONE: CPT | Performed by: NURSE PRACTITIONER

## 2019-03-22 PROCEDURE — 85025 COMPLETE CBC W/AUTO DIFF WBC: CPT | Performed by: NURSE PRACTITIONER

## 2019-03-22 PROCEDURE — 93000 ELECTROCARDIOGRAM COMPLETE: CPT | Performed by: NURSE PRACTITIONER

## 2019-03-22 PROCEDURE — 83525 ASSAY OF INSULIN: CPT | Performed by: NURSE PRACTITIONER

## 2019-03-22 PROCEDURE — 83527 ASSAY OF INSULIN: CPT | Performed by: NURSE PRACTITIONER

## 2019-03-22 PROCEDURE — 80053 COMPREHEN METABOLIC PANEL: CPT | Performed by: NURSE PRACTITIONER

## 2019-03-22 PROCEDURE — 84480 ASSAY TRIIODOTHYRONINE (T3): CPT | Performed by: NURSE PRACTITIONER

## 2019-03-22 PROCEDURE — 99213 OFFICE O/P EST LOW 20 MIN: CPT | Performed by: NURSE PRACTITIONER

## 2019-03-22 PROCEDURE — 83036 HEMOGLOBIN GLYCOSYLATED A1C: CPT | Performed by: NURSE PRACTITIONER

## 2019-03-22 RX ORDER — PHENTERMINE HYDROCHLORIDE 15 MG/1
15 CAPSULE ORAL EVERY MORNING
Qty: 30 CAPSULE | Refills: 0 | Status: SHIPPED | OUTPATIENT
Start: 2019-03-22 | End: 2019-04-30 | Stop reason: DRUGHIGH

## 2019-03-22 NOTE — PROGRESS NOTES
Chief Complaint   Patient presents with   • Weight Loss     Subjective   Alka Albert is a 23 y.o. female who presents to the office for XXXXX.    The following portions of the patient's history were reviewed and updated as appropriate: allergies, current medications, past family history, past medical history, past social history, past surgical history and problem list.    History of Present Illness   Obesity: onset in childhood. Worsening over time. Tried phentermine in past and TruVision, Low carb dieting and has had moderate results which are temporary--approx 6 months later.  Previously was on full strength 37.5mg phentermine and this interfered with sleep. She is requesting lowest dose. She has also been using Saxenda for about 2 weeks.     Past Medical History:   Diagnosis Date   • Infertility, female    • PCOS (polycystic ovarian syndrome)           Family History   Problem Relation Age of Onset   • No Known Problems Mother    • No Known Problems Father         Review of Systems   Constitutional: Negative.  Negative for appetite change, chills, fatigue, fever and unexpected weight change.        Obesity.   HENT: Negative.  Negative for congestion, ear pain, rhinorrhea and sore throat.    Eyes: Negative.  Negative for pain.   Respiratory: Negative.  Negative for cough, chest tightness and shortness of breath.    Cardiovascular: Negative.  Negative for chest pain and palpitations.   Gastrointestinal: Negative.  Negative for abdominal pain, constipation and nausea.   Endocrine: Negative.    Genitourinary: Negative.  Negative for dysuria.   Musculoskeletal: Negative.  Negative for back pain, joint swelling and neck pain.   Skin: Negative.  Negative for color change, pallor, rash and wound.   Allergic/Immunologic: Negative.    Neurological: Negative.  Negative for dizziness and headaches.   Hematological: Negative.    Psychiatric/Behavioral: Negative.  Negative for sleep disturbance and suicidal ideas.   All  "other systems reviewed and are negative.      Objective   Vitals:    03/22/19 0850   BP: 124/78   BP Location: Left arm   Patient Position: Sitting   Cuff Size: Adult   Pulse: 97   Resp: 14   Temp: 97.9 °F (36.6 °C)   TempSrc: Oral   SpO2: 98%   Weight: 126 kg (278 lb)   Height: 170.2 cm (67\")   PainSc: 0-No pain     Physical Exam   Constitutional: She is oriented to person, place, and time. She appears well-developed and well-nourished. No distress.   HENT:   Head: Normocephalic and atraumatic.   Mouth/Throat: No oropharyngeal exudate.   Eyes: Conjunctivae are normal. Pupils are equal, round, and reactive to light.   Neck: Normal range of motion. Neck supple. No thyromegaly present.   Cardiovascular: Normal rate, regular rhythm, normal heart sounds and intact distal pulses. Exam reveals no gallop and no friction rub.   No murmur heard.  Pulmonary/Chest: Effort normal and breath sounds normal. No respiratory distress. She has no wheezes. She has no rales. She exhibits no tenderness.   Abdominal: Soft. Bowel sounds are normal.   Central obesity   Musculoskeletal: Normal range of motion. She exhibits no edema, tenderness or deformity.   Lymphadenopathy:     She has no cervical adenopathy.   Neurological: She is alert and oriented to person, place, and time. No cranial nerve deficit.   Skin: Skin is warm and dry. Capillary refill takes 2 to 3 seconds. She is not diaphoretic. No erythema. No pallor.   Psychiatric: She has a normal mood and affect. Her behavior is normal. Judgment and thought content normal.   Nursing note and vitals reviewed.      Assessment/Plan   Alka was seen today for weight loss.    Diagnoses and all orders for this visit:    Hyperinsulinemia  -     Hemoglobin A1c  -     Insulin, Free & Total, Serum; Future    Class 3 severe obesity due to excess calories without serious comorbidity with body mass index (BMI) of 40.0 to 44.9 in adult (CMS/[facility])  -     phentermine 15 MG capsule; Take 1 capsule by " mouth Every Morning.  -     Lipid Panel  -     T4, Free  -     TSH  -     T3  -     Hemoglobin A1c    PCOS (polycystic ovarian syndrome)  -     phentermine 15 MG capsule; Take 1 capsule by mouth Every Morning.  -     Lipid Panel  -     Hemoglobin A1c    Essential hypertension  -     CBC & Differential  -     Comprehensive Metabolic Panel  -     T4, Free  -     TSH  -     T3    Screening for hyperlipidemia  -     Lipid Panel    EKG:   Indication: Screening for CV disease before prescribing stimulant for weight loss   Vent Rate: 87  bpm   DWAYNE:  158  ms   QRS: 76 ms   QT/QTc:380/457  ms   Interpretation: NSR  Previous EKG: Rate improved/ normal, compared to that of 9/08/2015, no deleterious changes however.          PHQ-2/PHQ-9 Depression Screening 3/22/2019   Little interest or pleasure in doing things 0   Feeling down, depressed, or hopeless 0   Total Score 0       KRYSTA Vizcaino         Return in about 4 weeks (around 4/19/2019) for Next scheduled follow up.    Patient Instructions   Increase water intake to 64 ounces daily  Limit calories to 1500 kcal per day  Use a smart phone meg for calorie counting and weight loss

## 2019-03-22 NOTE — PATIENT INSTRUCTIONS
Increase water intake to 64 ounces daily  Limit calories to 1500 kcal per day  Use a smart phone meg for calorie counting and weight loss

## 2019-03-29 LAB
INSULIN FREE SERPL-ACNC: 17 UU/ML
INSULIN SERPL-ACNC: 17 UU/ML

## 2019-04-30 ENCOUNTER — OFFICE VISIT (OUTPATIENT)
Dept: FAMILY MEDICINE CLINIC | Facility: CLINIC | Age: 24
End: 2019-04-30

## 2019-04-30 VITALS
HEIGHT: 67 IN | SYSTOLIC BLOOD PRESSURE: 122 MMHG | BODY MASS INDEX: 43.47 KG/M2 | OXYGEN SATURATION: 98 % | DIASTOLIC BLOOD PRESSURE: 74 MMHG | HEART RATE: 85 BPM | WEIGHT: 277 LBS

## 2019-04-30 DIAGNOSIS — E66.01 CLASS 3 SEVERE OBESITY DUE TO EXCESS CALORIES WITHOUT SERIOUS COMORBIDITY WITH BODY MASS INDEX (BMI) OF 45.0 TO 49.9 IN ADULT (HCC): ICD-10-CM

## 2019-04-30 DIAGNOSIS — E66.01 CLASS 3 SEVERE OBESITY DUE TO EXCESS CALORIES WITHOUT SERIOUS COMORBIDITY WITH BODY MASS INDEX (BMI) OF 40.0 TO 44.9 IN ADULT (HCC): Primary | ICD-10-CM

## 2019-04-30 DIAGNOSIS — E28.2 PCOS (POLYCYSTIC OVARIAN SYNDROME): Primary | ICD-10-CM

## 2019-04-30 PROCEDURE — 99213 OFFICE O/P EST LOW 20 MIN: CPT | Performed by: NURSE PRACTITIONER

## 2019-04-30 RX ORDER — PHENTERMINE HYDROCHLORIDE 37.5 MG/1
37.5 TABLET ORAL
Qty: 30 TABLET | Refills: 0 | Status: SHIPPED | OUTPATIENT
Start: 2019-04-30 | End: 2019-06-28 | Stop reason: SDUPTHER

## 2019-04-30 RX ORDER — PHENTERMINE HYDROCHLORIDE 37.5 MG/1
37.5 TABLET ORAL
Qty: 30 TABLET | Refills: 0 | Status: SHIPPED | OUTPATIENT
Start: 2019-04-30 | End: 2019-04-30 | Stop reason: SDUPTHER

## 2019-04-30 NOTE — PROGRESS NOTES
"Chief Complaint   Patient presents with   • Weight Check     Subjective   Alka Albert is a 23 y.o. female who presents to the office for supervised weight loss trial, visit #2.In the past she has tried and filed TruVision diet supplements, Low carb/ KETO dieting, low dose phentermine and very low calorie dieting of 1200kcal daily. No significant and sustainable losses were acheieved. Her BMI is 43.4 and starting weight was 278 #.  Her obesity is complicated by comorbid insulin resistance and some intermittent blood pressure elevation without a diagnosis of HTN.     The following portions of the patient's history were reviewed and updated as appropriate: allergies, current medications, past family history, past medical history, past social history, past surgical history and problem list.    History of Present Illness   Obesity/ Weight management:   She has lost 1 pounds in March from 3/22/19 to 4/30/19  She is to see Dr Rishi Mera, bariatric surgery, in [place] on 6/11/19.     Continued weight loss behaviors, exercise and dietary efforts:  She is continuing to  putting her fork down between bites and chewing each bite thoroughly.  She continues best efforts at dietary control, limiting calories and reducing concentrated sugars as before.   She brings her diet diary in. She is utilizing the \"[employer] pal\" smart phone meg and her step meg.   Utilizing My Plate meg and entering everything that she eats.    She has her My Fitness Pal meg confirms caloric 6727-0077 Kcals per day consistently.   Since 3/22/19 has been taking phentermine 15mg po daily, reports she is hungry by 2pm. Interested in the higher dose tablet.          New weight loss behavior, exercise and dietary efforts instituted since last visit:   Has increased her water intake to 65 ounces of water daily or greater.   She is now spending 30 minutes daily walking.   She has eliminated sweets and sodas from her diet.    She has reduced her total number " "of carbohydrates consumed to not more than 50 per meal and not more than 15 per snack.   Carb intake demonstrated on My Fitness Pal [place] averages about 40  carbs per day over past 2 weeks, consistently.   She also intends to take the stairs more frequently and increase the pace during her daily walks.    She is unable to count steps due to her watch breaking, will get a new one this weekend and resume counting steps.        Past Medical History:   Diagnosis Date   • Infertility, female    • PCOS (polycystic ovarian syndrome)           Family History   Problem Relation Age of Onset   • No Known Problems Mother    • No Known Problems Father         Review of Systems   Constitutional: Negative.  Negative for appetite change, chills, fatigue, fever and unexpected weight change.        Obesity.   HENT: Negative.  Negative for congestion, ear pain, rhinorrhea and sore throat.    Eyes: Negative.  Negative for pain.   Respiratory: Negative.  Negative for cough, chest tightness and shortness of breath.    Cardiovascular: Negative.  Negative for chest pain and palpitations.   Gastrointestinal: Negative.  Negative for abdominal pain, constipation and nausea.   Endocrine: Negative.    Genitourinary: Negative.  Negative for dysuria.   Musculoskeletal: Negative.  Negative for back pain, joint swelling and neck pain.   Skin: Negative.  Negative for color change, pallor, rash and wound.   Allergic/Immunologic: Negative.    Neurological: Negative.  Negative for dizziness and headaches.   Hematological: Negative.    Psychiatric/Behavioral: Negative.  Negative for sleep disturbance and suicidal ideas.   All other systems reviewed and are negative.      Objective   Vitals:    04/30/19 1516   BP: 122/74   BP Location: Left arm   Patient Position: Sitting   Cuff Size: Adult   Pulse: 85   SpO2: 98%   Weight: 126 kg (277 lb)   Height: 170.2 cm (67.01\")   PainSc: 0-No pain     Physical Exam   Constitutional: She is oriented to person, place, " and time. She appears well-developed and well-nourished. No distress.   HENT:   Head: Normocephalic and atraumatic.   Mouth/Throat: No oropharyngeal exudate.   Eyes: Conjunctivae are normal. Pupils are equal, round, and reactive to light.   Neck: Normal range of motion. Neck supple. No thyromegaly present.   Cardiovascular: Normal rate, regular rhythm, normal heart sounds and intact distal pulses. Exam reveals no gallop and no friction rub.   No murmur heard.  Pulmonary/Chest: Effort normal and breath sounds normal. No respiratory distress. She has no wheezes. She has no rales. She exhibits no tenderness.   Abdominal: Soft. Bowel sounds are normal.   Central obesity   Musculoskeletal: Normal range of motion. She exhibits no edema, tenderness or deformity.   Lymphadenopathy:     She has no cervical adenopathy.   Neurological: She is alert and oriented to person, place, and time. No cranial nerve deficit.   Skin: Skin is warm and dry. Capillary refill takes 2 to 3 seconds. She is not diaphoretic. No erythema. No pallor.   Psychiatric: She has a normal mood and affect. Her behavior is normal. Judgment and thought content normal.   Nursing note and vitals reviewed.      Assessment/Plan   Alka was seen today for weight check.    Diagnoses and all orders for this visit:    Class 3 severe obesity due to excess calories without serious comorbidity with body mass index (BMI) of 40.0 to 44.9 in adult (CMS/HCC)    Other orders  -     Discontinue: phentermine (ADIPEX-P) 37.5 MG tablet; Take 1 tablet by mouth Every Morning Before Breakfast.  -     phentermine (ADIPEX-P) 37.5 MG tablet; Take 1 tablet by mouth Every Morning Before Breakfast.           PHQ-2/PHQ-9 Depression Screening 4/30/2019   Little interest or pleasure in doing things 0   Feeling down, depressed, or hopeless 0   Total Score 0       Luz Elena ORTIZ Hanna, APRN         Return in about 1 month (around 5/30/2019).    There are no Patient Instructions on file for this  visit.    Lab on 03/22/2019   Component Date Value Ref Range Status   • Insulin, Free 03/22/2019 17  uU/mL Final    Reference Range:  Pubertal Children and  Adults (fasting): 0 - 17   • Insulin 03/22/2019 17  uU/mL Final    Non-Diabetic:  In the absence of insulin-binding antibodies,  the free and total insulin assays are equivalent. However,  this assay is intended for use in diabetics with insulin  autoantibody present. Measurement is performed on  acid-treated samples and, therefore, the sensitivity and  absolute values by this method may differ from our direct  insulin ICMA.  Insulin Dependent Diabetic Patients:  Free Insulin levels  vary depending on the capacity and affinity of circulating  insulin-binding antibodies and the dose of insulin given to  the patient.  Total insulin levels represent free insulin  and antibody bound insulin fractions.   Office Visit on 03/22/2019   Component Date Value Ref Range Status   • Glucose 03/22/2019 89  74 - 99 mg/dL Final   • BUN 03/22/2019 14  7 - 17 mg/dL Final   • Creatinine 03/22/2019 0.74  0.52 - 1.04 mg/dL Final   • Sodium 03/22/2019 138  137 - 145 mmol/L Final   • Potassium 03/22/2019 4.1  3.4 - 5.0 mmol/L Final   • Chloride 03/22/2019 102  98 - 107 mmol/L Final   • CO2 03/22/2019 27.0  22.0 - 30.0 mmol/L Final   • Calcium 03/22/2019 9.0  8.4 - 10.2 mg/dL Final   • Total Protein 03/22/2019 7.2  6.3 - 8.2 g/dL Final   • Albumin 03/22/2019 4.20  3.50 - 5.00 g/dL Final   • ALT (SGPT) 03/22/2019 23  <=35 U/L Final   • AST (SGOT) 03/22/2019 20  14 - 36 U/L Final   • Alkaline Phosphatase 03/22/2019 57  38 - 126 U/L Final   • Total Bilirubin 03/22/2019 0.5  0.2 - 1.3 mg/dL Final   • eGFR Non African Amer 03/22/2019 97  71 - 165 mL/min/1.73 Final   • Globulin 03/22/2019 3.0  2.3 - 3.5 gm/dL Final   • A/G Ratio 03/22/2019 1.4  1.1 - 1.8 g/dL Final   • BUN/Creatinine Ratio 03/22/2019 18.9  7.0 - 25.0 Final   • Anion Gap 03/22/2019 9.0  5.0 - 15.0 mmol/L Final   • Total  Cholesterol 03/22/2019 169  150 - 200 mg/dL Final   • Triglycerides 03/22/2019 82  <=150 mg/dL Final   • HDL Cholesterol 03/22/2019 51  40 - 59 mg/dL Final   • LDL Cholesterol  03/22/2019 102* <=100 mg/dL Final   • VLDL Cholesterol 03/22/2019 16.4  mg/dL Final   • LDL/HDL Ratio 03/22/2019 1.99  0.00 - 3.22 Final   • Free T4 03/22/2019 1.12  0.78 - 2.19 ng/dL Final   • TSH 03/22/2019 1.350  0.460 - 4.680 mIU/mL Final   • T3, Total 03/22/2019 146.0  97.0 - 169.0 ng/dl Final   • Hemoglobin A1C 03/22/2019 5.5  4 - 5.6 % Final   • WBC 03/22/2019 8.25  3.20 - 9.80 10*3/mm3 Final   • RBC 03/22/2019 4.28  3.77 - 5.16 10*6/mm3 Final   • Hemoglobin 03/22/2019 12.7  12.0 - 15.5 g/dL Final   • Hematocrit 03/22/2019 39.0  35.0 - 45.0 % Final   • MCV 03/22/2019 91.1  80.0 - 98.0 fL Final   • MCH 03/22/2019 29.7  26.5 - 34.0 pg Final   • MCHC 03/22/2019 32.6  31.4 - 36.0 g/dL Final   • RDW 03/22/2019 12.9  11.5 - 14.5 % Final   • RDW-SD 03/22/2019 41.8  36.4 - 46.3 fl Final   • MPV 03/22/2019 10.3  8.0 - 12.0 fL Final   • Platelets 03/22/2019 257  150 - 450 10*3/mm3 Final   • Neutrophil % 03/22/2019 67.3  37.0 - 80.0 % Final   • Lymphocyte % 03/22/2019 22.1  10.0 - 50.0 % Final   • Monocyte % 03/22/2019 9.7  0.0 - 12.0 % Final   • Eosinophil % 03/22/2019 0.8  0.0 - 7.0 % Final   • Basophil % 03/22/2019 0.1  0.0 - 2.0 % Final   • Neutrophils, Absolute 03/22/2019 5.55  2.00 - 8.60 10*3/mm3 Final   • Lymphocytes, Absolute 03/22/2019 1.82  0.60 - 4.20 10*3/mm3 Final   • Monocytes, Absolute 03/22/2019 0.80  0.00 - 0.90 10*3/mm3 Final   • Eosinophils, Absolute 03/22/2019 0.07  0.00 - 0.70 10*3/mm3 Final   • Basophils, Absolute 03/22/2019 0.01  0.00 - 0.20 10*3/mm3 Final   Lab on 01/18/2019   Component Date Value Ref Range Status   • Strep A Ag 01/18/2019 Negative  Negative Final   • Influenza A Ag, EIA 01/18/2019 Negative  Negative Final   • Influenza B Ag, EIA 01/18/2019 Negative  Negative Final   • Throat Culture, Beta Strep  01/18/2019 No Beta Hemolytic Streptococcus Isolated at 2 days   Final   ]

## 2019-05-24 ENCOUNTER — OFFICE VISIT (OUTPATIENT)
Dept: FAMILY MEDICINE CLINIC | Facility: CLINIC | Age: 24
End: 2019-05-24

## 2019-05-24 VITALS
SYSTOLIC BLOOD PRESSURE: 130 MMHG | BODY MASS INDEX: 42.56 KG/M2 | OXYGEN SATURATION: 98 % | HEIGHT: 67 IN | HEART RATE: 108 BPM | WEIGHT: 271.2 LBS | DIASTOLIC BLOOD PRESSURE: 92 MMHG | RESPIRATION RATE: 18 BRPM

## 2019-05-24 DIAGNOSIS — E66.01 CLASS 3 SEVERE OBESITY DUE TO EXCESS CALORIES WITHOUT SERIOUS COMORBIDITY WITH BODY MASS INDEX (BMI) OF 40.0 TO 44.9 IN ADULT (HCC): Chronic | ICD-10-CM

## 2019-05-24 DIAGNOSIS — H66.003 ACUTE SUPPURATIVE OTITIS MEDIA OF BOTH EARS WITHOUT SPONTANEOUS RUPTURE OF TYMPANIC MEMBRANES, RECURRENCE NOT SPECIFIED: ICD-10-CM

## 2019-05-24 DIAGNOSIS — I10 ESSENTIAL HYPERTENSION: Primary | ICD-10-CM

## 2019-05-24 PROCEDURE — 99214 OFFICE O/P EST MOD 30 MIN: CPT | Performed by: NURSE PRACTITIONER

## 2019-05-24 RX ORDER — LABETALOL 100 MG/1
100 TABLET, FILM COATED ORAL 2 TIMES DAILY
Qty: 30 TABLET | Refills: 5 | Status: SHIPPED | OUTPATIENT
Start: 2019-05-24 | End: 2019-06-28 | Stop reason: ALTCHOICE

## 2019-05-24 RX ORDER — AZITHROMYCIN 250 MG/1
TABLET, FILM COATED ORAL
Qty: 6 TABLET | Refills: 0 | Status: SHIPPED | OUTPATIENT
Start: 2019-05-24 | End: 2019-06-28

## 2019-05-24 RX ORDER — OFLOXACIN 3 MG/ML
5 SOLUTION AURICULAR (OTIC) 2 TIMES DAILY
Qty: 10 ML | Refills: 0 | Status: SHIPPED | OUTPATIENT
Start: 2019-05-24 | End: 2019-06-11

## 2019-05-24 NOTE — PROGRESS NOTES
"Chief Complaint   Patient presents with   • Weight Check     Subjective   Alka Albert is a 23 y.o. female who presents to the office for follow up of stimulant assisted weight loss as well as chronic conditions.,     The following portions of the patient's history were reviewed and updated as appropriate: allergies, current medications, past family history, past medical history, past social history, past surgical history and problem list.    History of Present Illness   Obesity/ Weight management:   She has lost 1 pounds in March from 3/22/19 to 4/30/19  She is to see Dr Rishi Mera, bariatric surgery, in [place] on 6/11/19.   Has lost 6 pounds from 4/30/19 visit to today 5/24/19  Total weight loss is now 7 pounds since 3/22/19 and BMI is 42.5    Continued weight loss behaviors, exercise and dietary efforts:  She is continuing to  putting her fork down between bites and chewing each bite thoroughly.  She continues best efforts at dietary control, limiting calories and reducing concentrated sugars as before.   She brings her diet diary in. She is utilizing the \"[employer] pal\" smart phone meg and her step meg.   Utilizing My Plate meg and entering everything that she eats.    She has her My Fitness Pal meg confirms caloric 7655-6853 Kcals per day consistently.   Since 3/22/19 has been taking phentermine 15mg po daily, reports she is hungry by 2pm. Interested in the higher dose tablet. She did complete the 15 mg tablets and filled the 37.5mg tablets at first of May with better results with weight loss.   She is to continue current weight loss behaviors and we will also address her hyperinsulinemia with a low dose metformin as last insulin level was 17 off of metformin.           New weight loss behavior, exercise and dietary efforts instituted since last visit:   Has increased her water intake to 65 ounces of water daily or greater.   She is now spending 30 minutes daily walking.   She has eliminated sweets and " sodas from her diet.    She has reduced her total number of carbohydrates consumed to not more than 50 per meal and not more than 15 per snack.   Carb intake demonstrated on My Fitness Pal [place] averages about 40  carbs per day over past 2 weeks, consistently.   She also intends to take the stairs more frequently and increase the pace during her daily walks.    She is unable to count steps due to her watch breaking, will get a new one this weekend and resume counting steps.  She is getting on average 7000 steps daily from 4/30/19 to today, 5/24/19, and occasionally hits 10,000 steps daily during each week.  Going forward May 24 to June visit she is to continue this as well as above listed weight loss behaviors.    Hyperinsulinemia of PCOS/ metabolic syndrome: Last insulin level=17 on 3/22/19.  Will resume metformin 500mg po daily with meal every other day.  HTN: Elevated blood pressure without diagnosis of hypertension has now returned, with mild diastolic hypertension (130/92 today) and we will resume the labetalol on a consistent basis and add the official diagnosis HTN.     New problem: right ear pain  With some drainage of yellow/ green liquid, no fevers or chills, no sinus pressure/pain, no Post nasal drip. No sore throat    Past Medical History:   Diagnosis Date   • Infertility, female    • PCOS (polycystic ovarian syndrome)           Family History   Problem Relation Age of Onset   • No Known Problems Mother    • No Known Problems Father         Review of Systems   Constitutional: Negative.  Negative for appetite change, chills, fatigue, fever and unexpected weight change.        Obesity.   HENT: Positive for ear pain. Negative for congestion, rhinorrhea and sore throat.    Eyes: Negative.  Negative for pain.   Respiratory: Negative.  Negative for cough, chest tightness and shortness of breath.    Cardiovascular: Negative.  Negative for chest pain and palpitations.        Blood pressure rising again with mild  "diastolic hypertension, has not required the labetalol for quite a while   Gastrointestinal: Negative.  Negative for abdominal pain, constipation and nausea.   Endocrine: Negative.         Hyperinsulinemia not currently treated with metformin   Genitourinary: Negative.  Negative for dysuria.   Musculoskeletal: Negative.  Negative for back pain, joint swelling and neck pain.   Skin: Negative.  Negative for color change, pallor, rash and wound.   Allergic/Immunologic: Negative.    Neurological: Negative.  Negative for dizziness and headaches.   Hematological: Negative.    Psychiatric/Behavioral: Negative.  Negative for sleep disturbance and suicidal ideas.   All other systems reviewed and are negative.      Objective   Vitals:    05/24/19 0810   BP: 130/92   Pulse: 108   Resp: 18   SpO2: 98%   Weight: 123 kg (271 lb 3.2 oz)   Height: 170.2 cm (67.01\")   PainSc: 0-No pain     Physical Exam    Assessment/Plan   Alka was seen today for weight check.    Diagnoses and all orders for this visit:    Essential hypertension  -     labetalol (NORMODYNE) 100 MG tablet; Take 1 tablet by mouth 2 (Two) Times a Day.    Class 3 severe obesity due to excess calories without serious comorbidity with body mass index (BMI) of 40.0 to 44.9 in adult (CMS/[facility], located within [facility])    Acute suppurative otitis media of both ears without spontaneous rupture of tympanic membranes, recurrence not specified  -     ofloxacin (FLOXIN) 0.3 % otic solution; Administer 5 drops into both ears 2 (Two) Times a Day. For 1 week  -     azithromycin (ZITHROMAX) 250 MG tablet; Take 2 tablets the first day, then 1 tablet daily for 4 days.    Other orders  -     metFORMIN (GLUCOPHAGE) 500 MG tablet; Take 1 by mouth with food every other day for hyperinsulinemia           PHQ-2/PHQ-9 Depression Screening 4/30/2019   Little interest or pleasure in doing things 0   Feeling down, depressed, or hopeless 0   Total Score 0       KRYSTA Vizcaino         Return in about 4 weeks " (around 6/21/2019), or if symptoms worsen or fail to improve.    There are no Patient Instructions on file for this visit.    Lab on 03/22/2019   Component Date Value Ref Range Status   • Insulin, Free 03/22/2019 17  uU/mL Final    Reference Range:  Pubertal Children and  Adults (fasting): 0 - 17   • Insulin 03/22/2019 17  uU/mL Final    Non-Diabetic:  In the absence of insulin-binding antibodies,  the free and total insulin assays are equivalent. However,  this assay is intended for use in diabetics with insulin  autoantibody present. Measurement is performed on  acid-treated samples and, therefore, the sensitivity and  absolute values by this method may differ from our direct  insulin ICMA.  Insulin Dependent Diabetic Patients:  Free Insulin levels  vary depending on the capacity and affinity of circulating  insulin-binding antibodies and the dose of insulin given to  the patient.  Total insulin levels represent free insulin  and antibody bound insulin fractions.   Office Visit on 03/22/2019   Component Date Value Ref Range Status   • Glucose 03/22/2019 89  74 - 99 mg/dL Final   • BUN 03/22/2019 14  7 - 17 mg/dL Final   • Creatinine 03/22/2019 0.74  0.52 - 1.04 mg/dL Final   • Sodium 03/22/2019 138  137 - 145 mmol/L Final   • Potassium 03/22/2019 4.1  3.4 - 5.0 mmol/L Final   • Chloride 03/22/2019 102  98 - 107 mmol/L Final   • CO2 03/22/2019 27.0  22.0 - 30.0 mmol/L Final   • Calcium 03/22/2019 9.0  8.4 - 10.2 mg/dL Final   • Total Protein 03/22/2019 7.2  6.3 - 8.2 g/dL Final   • Albumin 03/22/2019 4.20  3.50 - 5.00 g/dL Final   • ALT (SGPT) 03/22/2019 23  <=35 U/L Final   • AST (SGOT) 03/22/2019 20  14 - 36 U/L Final   • Alkaline Phosphatase 03/22/2019 57  38 - 126 U/L Final   • Total Bilirubin 03/22/2019 0.5  0.2 - 1.3 mg/dL Final   • eGFR Non African Amer 03/22/2019 97  71 - 165 mL/min/1.73 Final   • Globulin 03/22/2019 3.0  2.3 - 3.5 gm/dL Final   • A/G Ratio 03/22/2019 1.4  1.1 - 1.8 g/dL Final   •  BUN/Creatinine Ratio 03/22/2019 18.9  7.0 - 25.0 Final   • Anion Gap 03/22/2019 9.0  5.0 - 15.0 mmol/L Final   • Total Cholesterol 03/22/2019 169  150 - 200 mg/dL Final   • Triglycerides 03/22/2019 82  <=150 mg/dL Final   • HDL Cholesterol 03/22/2019 51  40 - 59 mg/dL Final   • LDL Cholesterol  03/22/2019 102* <=100 mg/dL Final   • VLDL Cholesterol 03/22/2019 16.4  mg/dL Final   • LDL/HDL Ratio 03/22/2019 1.99  0.00 - 3.22 Final   • Free T4 03/22/2019 1.12  0.78 - 2.19 ng/dL Final   • TSH 03/22/2019 1.350  0.460 - 4.680 mIU/mL Final   • T3, Total 03/22/2019 146.0  97.0 - 169.0 ng/dl Final   • Hemoglobin A1C 03/22/2019 5.5  4 - 5.6 % Final   • WBC 03/22/2019 8.25  3.20 - 9.80 10*3/mm3 Final   • RBC 03/22/2019 4.28  3.77 - 5.16 10*6/mm3 Final   • Hemoglobin 03/22/2019 12.7  12.0 - 15.5 g/dL Final   • Hematocrit 03/22/2019 39.0  35.0 - 45.0 % Final   • MCV 03/22/2019 91.1  80.0 - 98.0 fL Final   • MCH 03/22/2019 29.7  26.5 - 34.0 pg Final   • MCHC 03/22/2019 32.6  31.4 - 36.0 g/dL Final   • RDW 03/22/2019 12.9  11.5 - 14.5 % Final   • RDW-SD 03/22/2019 41.8  36.4 - 46.3 fl Final   • MPV 03/22/2019 10.3  8.0 - 12.0 fL Final   • Platelets 03/22/2019 257  150 - 450 10*3/mm3 Final   • Neutrophil % 03/22/2019 67.3  37.0 - 80.0 % Final   • Lymphocyte % 03/22/2019 22.1  10.0 - 50.0 % Final   • Monocyte % 03/22/2019 9.7  0.0 - 12.0 % Final   • Eosinophil % 03/22/2019 0.8  0.0 - 7.0 % Final   • Basophil % 03/22/2019 0.1  0.0 - 2.0 % Final   • Neutrophils, Absolute 03/22/2019 5.55  2.00 - 8.60 10*3/mm3 Final   • Lymphocytes, Absolute 03/22/2019 1.82  0.60 - 4.20 10*3/mm3 Final   • Monocytes, Absolute 03/22/2019 0.80  0.00 - 0.90 10*3/mm3 Final   • Eosinophils, Absolute 03/22/2019 0.07  0.00 - 0.70 10*3/mm3 Final   • Basophils, Absolute 03/22/2019 0.01  0.00 - 0.20 10*3/mm3 Final   ]

## 2019-06-11 RX ORDER — CIPROFLOXACIN 500 MG/1
500 TABLET, FILM COATED ORAL 2 TIMES DAILY
Qty: 20 TABLET | Refills: 0 | Status: SHIPPED | OUTPATIENT
Start: 2019-06-11 | End: 2019-06-21

## 2019-06-11 RX ORDER — OFLOXACIN 3 MG/ML
1 SOLUTION/ DROPS OPHTHALMIC 4 TIMES DAILY
Start: 2019-06-11 | End: 2019-07-26

## 2019-06-17 RX ORDER — DOXYCYCLINE 100 MG/1
200 CAPSULE ORAL 2 TIMES DAILY
Qty: 28 CAPSULE | Refills: 0 | Status: SHIPPED | OUTPATIENT
Start: 2019-06-17 | End: 2019-06-28

## 2019-06-28 ENCOUNTER — LAB (OUTPATIENT)
Dept: LAB | Facility: OTHER | Age: 24
End: 2019-06-28

## 2019-06-28 ENCOUNTER — OFFICE VISIT (OUTPATIENT)
Dept: FAMILY MEDICINE CLINIC | Facility: CLINIC | Age: 24
End: 2019-06-28

## 2019-06-28 VITALS
BODY MASS INDEX: 42.53 KG/M2 | HEART RATE: 111 BPM | HEIGHT: 67 IN | TEMPERATURE: 97.7 F | SYSTOLIC BLOOD PRESSURE: 142 MMHG | DIASTOLIC BLOOD PRESSURE: 86 MMHG | WEIGHT: 271 LBS | RESPIRATION RATE: 18 BRPM | OXYGEN SATURATION: 98 %

## 2019-06-28 DIAGNOSIS — E66.01 MORBID OBESITY DUE TO EXCESS CALORIES (HCC): ICD-10-CM

## 2019-06-28 DIAGNOSIS — E28.2 PCOS (POLYCYSTIC OVARIAN SYNDROME): ICD-10-CM

## 2019-06-28 DIAGNOSIS — I10 ESSENTIAL HYPERTENSION: ICD-10-CM

## 2019-06-28 DIAGNOSIS — H66.90 EAR INFECTION: ICD-10-CM

## 2019-06-28 DIAGNOSIS — E66.01 CLASS 3 SEVERE OBESITY DUE TO EXCESS CALORIES WITHOUT SERIOUS COMORBIDITY WITH BODY MASS INDEX (BMI) OF 40.0 TO 44.9 IN ADULT (HCC): Primary | Chronic | ICD-10-CM

## 2019-06-28 DIAGNOSIS — E16.1 HYPERINSULINEMIA: Primary | Chronic | ICD-10-CM

## 2019-06-28 DIAGNOSIS — E16.1 HYPERINSULINEMIA: Chronic | ICD-10-CM

## 2019-06-28 LAB
ALBUMIN SERPL-MCNC: 4.3 G/DL (ref 3.5–5)
ALBUMIN/GLOB SERPL: 1.4 G/DL (ref 1.1–1.8)
ALP SERPL-CCNC: 64 U/L (ref 38–126)
ALT SERPL W P-5'-P-CCNC: 24 U/L
ANION GAP SERPL CALCULATED.3IONS-SCNC: 11 MMOL/L (ref 5–15)
AST SERPL-CCNC: 23 U/L (ref 14–36)
BASOPHILS # BLD AUTO: 0.02 10*3/MM3 (ref 0–0.2)
BASOPHILS NFR BLD AUTO: 0.4 % (ref 0–1.5)
BILIRUB SERPL-MCNC: 0.6 MG/DL (ref 0.2–1.3)
BUN BLD-MCNC: 12 MG/DL (ref 7–23)
BUN/CREAT SERPL: 15.6 (ref 7–25)
CALCIUM SPEC-SCNC: 9.3 MG/DL (ref 8.4–10.2)
CHLORIDE SERPL-SCNC: 104 MMOL/L (ref 101–112)
CO2 SERPL-SCNC: 27 MMOL/L (ref 22–30)
CREAT BLD-MCNC: 0.77 MG/DL (ref 0.52–1.04)
DEPRECATED RDW RBC AUTO: 42.3 FL (ref 37–54)
EOSINOPHIL # BLD AUTO: 0.11 10*3/MM3 (ref 0–0.4)
EOSINOPHIL NFR BLD AUTO: 1.9 % (ref 0.3–6.2)
ERYTHROCYTE [DISTWIDTH] IN BLOOD BY AUTOMATED COUNT: 13 % (ref 12.3–15.4)
GFR SERPL CREATININE-BSD FRML MDRD: 92 ML/MIN/1.73 (ref 71–165)
GLOBULIN UR ELPH-MCNC: 3.1 GM/DL (ref 2.3–3.5)
GLUCOSE BLD-MCNC: 100 MG/DL (ref 70–99)
HCT VFR BLD AUTO: 40.1 % (ref 34–46.6)
HGB BLD-MCNC: 13.6 G/DL (ref 12–15.9)
LYMPHOCYTES # BLD AUTO: 1.69 10*3/MM3 (ref 0.7–3.1)
LYMPHOCYTES NFR BLD AUTO: 29.7 % (ref 19.6–45.3)
MCH RBC QN AUTO: 31.1 PG (ref 26.6–33)
MCHC RBC AUTO-ENTMCNC: 33.9 G/DL (ref 31.5–35.7)
MCV RBC AUTO: 91.6 FL (ref 79–97)
MONOCYTES # BLD AUTO: 0.73 10*3/MM3 (ref 0.1–0.9)
MONOCYTES NFR BLD AUTO: 12.8 % (ref 5–12)
NEUTROPHILS # BLD AUTO: 3.14 10*3/MM3 (ref 1.7–7)
NEUTROPHILS NFR BLD AUTO: 55.2 % (ref 42.7–76)
PLATELET # BLD AUTO: 286 10*3/MM3 (ref 140–450)
PMV BLD AUTO: 10.3 FL (ref 6–12)
POTASSIUM BLD-SCNC: 4.1 MMOL/L (ref 3.4–5)
PROT SERPL-MCNC: 7.4 G/DL (ref 6.3–8.6)
RBC # BLD AUTO: 4.38 10*6/MM3 (ref 3.77–5.28)
SODIUM BLD-SCNC: 142 MMOL/L (ref 137–145)
WBC NRBC COR # BLD: 5.69 10*3/MM3 (ref 3.4–10.8)

## 2019-06-28 PROCEDURE — 99214 OFFICE O/P EST MOD 30 MIN: CPT | Performed by: NURSE PRACTITIONER

## 2019-06-28 PROCEDURE — 80053 COMPREHEN METABOLIC PANEL: CPT | Performed by: NURSE PRACTITIONER

## 2019-06-28 PROCEDURE — 85025 COMPLETE CBC W/AUTO DIFF WBC: CPT | Performed by: NURSE PRACTITIONER

## 2019-06-28 PROCEDURE — 36415 COLL VENOUS BLD VENIPUNCTURE: CPT | Performed by: NURSE PRACTITIONER

## 2019-06-28 RX ORDER — AMLODIPINE BESYLATE 10 MG/1
10 TABLET ORAL DAILY
Qty: 30 TABLET | Refills: 5 | Status: SHIPPED | OUTPATIENT
Start: 2019-06-28 | End: 2019-11-26 | Stop reason: SDUPTHER

## 2019-06-28 NOTE — PROGRESS NOTES
"Chief Complaint   Patient presents with   • Weight Check     Subjective   Alka Albert is a 24 y.o. female who presents to the office for supervised weight loss trial.    The following portions of the patient's history were reviewed and updated as appropriate: allergies, current medications, past family history, past medical history, past social history, past surgical history and problem list.    History of Present Illness     Obesity/ Weight management:   She has lost 1 pounds in March from 3/22/19 to 4/30/19  She is to see Dr Rishi Mera, bariatric surgery, in [place] on 6/11/19.   Has lost 6 pounds from 4/30/19 visit to today 5/24/19  Total weight loss is now 7 pounds since 3/22/19 and BMI is 42.5   Weight stable at 271 today, same as on 5/24/19. BMI 42.4    Continued weight loss behaviors, exercise and dietary efforts:  She is continuing to  putting her fork down between bites and chewing each bite thoroughly.  She continues best efforts at dietary control, limiting calories and reducing concentrated sugars as before.   She brings her diet diary in. She is utilizing the \"[employer] pal\" smart phone meg and her step meg.   Utilizing My Plate meg and entering everything that she eats.    She has her My Fitness Pal meg confirms caloric 1295-8674 Kcals per day consistently.   Since 3/22/19 has been taking phentermine 15mg po daily, reports she is hungry by 2pm. Interested in the higher dose tablet. She did complete the 15 mg tablets and filled the 37.5mg tablets at first of May with better results with weight loss.   She is to continue current weight loss behaviors and we will also address her hyperinsulinemia with a low dose metformin as last insulin level was 17 off of metformin.   She reports she is taking the phentermine only every other day because it makes her heart rate faster.   She is still trying to keep diet at 1200 farshad daily, but has failed multiple days this week.  She is to increase efforts to count " calories and limit to 1200 per day.         New weight loss behavior, exercise and dietary efforts instituted since last visit:   Has increased her water intake to 65 ounces of water daily or greater.   She is now spending 30 minutes daily walking.   She has eliminated sweets and sodas from her diet.    She has reduced her total number of carbohydrates consumed to not more than 50 per meal and not more than 15 per snack.   Carb intake demonstrated on My Fitness Pal [place] averages about 40  carbs per day over past 2 weeks, consistently.   She also intends to take the stairs more frequently and increase the pace during her daily walks.    She is unable to count steps due to her watch breaking, will get a new one this weekend and resume counting steps.  She is getting on average 7000 steps daily from 4/30/19 to today, 5/24/19, and occasionally hits 10,000 steps daily during each week.  Going forward May 24 to June visit she is to continue this as well as above listed weight loss behaviors.   For upcoming month she is to continue all these behaviors.    Hyperinsulinemia of PCOS/ metabolic syndrome: Last insulin level=17 on 3/22/19.  tolerating metformin 500mg po daily with meal every other day.  HTN: labetalol is causing undue drowsiness after each dose. Patient no longer desiring pregnancy. BP not at goal on labetalol today. Stop labetalol and begin amlodipine 10mg daily.     Past Medical History:   Diagnosis Date   • IBS (irritable bowel syndrome)    • Infertility, female    • PCOS (polycystic ovarian syndrome)    • Seasonal allergies           Family History   Problem Relation Age of Onset   • No Known Problems Mother    • No Known Problems Father         Review of Systems   Constitutional: Negative.  Negative for appetite change, chills, fatigue, fever and unexpected weight change.        Obesity.   HENT: Positive for ear pain (bilateral). Negative for congestion, rhinorrhea and sore throat.    Eyes: Negative.   "Negative for pain.   Respiratory: Negative.  Negative for cough, chest tightness and shortness of breath.    Cardiovascular: Negative.  Negative for chest pain and palpitations.        Blood pressure rising again with mild diastolic hypertension, has not required the labetalol for quite a while   Gastrointestinal: Negative.  Negative for abdominal pain, constipation and nausea.   Endocrine: Negative.         Hyperinsulinemia not currently treated with metformin   Genitourinary: Negative.  Negative for dysuria.   Musculoskeletal: Negative.  Negative for back pain, joint swelling and neck pain.   Skin: Negative.  Negative for color change, pallor, rash and wound.   Allergic/Immunologic: Negative.    Neurological: Negative.  Negative for dizziness and headaches.   Hematological: Negative.    Psychiatric/Behavioral: Negative.  Negative for sleep disturbance and suicidal ideas.   All other systems reviewed and are negative.      Objective   Vitals:    06/28/19 1023   BP: 142/86   BP Location: Left arm   Patient Position: Sitting   Cuff Size: Adult   Pulse: 111   Resp: 18   Temp: 97.7 °F (36.5 °C)   TempSrc: Oral   SpO2: 98%   Weight: 123 kg (271 lb)   Height: 170.2 cm (67.01\")   PainSc: 0-No pain     Physical Exam   Constitutional: She is oriented to person, place, and time. She appears well-developed and well-nourished.   HENT:   Head: Normocephalic and atraumatic.   Eyes: Conjunctivae are normal. Pupils are equal, round, and reactive to light.   Neck: Normal range of motion. Neck supple.   Cardiovascular: Normal rate, regular rhythm, normal heart sounds and intact distal pulses. Exam reveals no gallop and no friction rub.   No murmur heard.  Pulmonary/Chest: Effort normal and breath sounds normal. No respiratory distress. She has no wheezes. She has no rales. She exhibits no tenderness.   Abdominal: Soft. Bowel sounds are normal.   Musculoskeletal: Normal range of motion. She exhibits no edema, tenderness or deformity. "   Lymphadenopathy:     She has no cervical adenopathy.   Neurological: She is alert and oriented to person, place, and time.   Skin: Skin is warm and dry. Capillary refill takes 2 to 3 seconds. No erythema. No pallor.   Psychiatric: She has a normal mood and affect. Her behavior is normal. Judgment and thought content normal.   Nursing note and vitals reviewed.      Assessment/Plan   Alka was seen today for weight check.    Diagnoses and all orders for this visit:    Class 3 severe obesity due to excess calories without serious comorbidity with body mass index (BMI) of 40.0 to 44.9 in adult (CMS/HCC)    Hyperinsulinemia    PCOS (polycystic ovarian syndrome)    Morbid obesity due to excess calories (CMS/HCC)    Essential hypertension  -     amLODIPine (NORVASC) 10 MG tablet; Take 1 tablet by mouth Daily.           PHQ-2/PHQ-9 Depression Screening 6/28/2019   Little interest or pleasure in doing things 0   Feeling down, depressed, or hopeless 0   Total Score 0       Crystal L Hanna, APRN         Return in about 1 month (around 7/28/2019).    There are no Patient Instructions on file for this visit.    Lab on 06/28/2019   Component Date Value Ref Range Status   • Glucose 06/28/2019 100* 70 - 99 mg/dL Final   • BUN 06/28/2019 12  7 - 23 mg/dL Final   • Creatinine 06/28/2019 0.77  0.52 - 1.04 mg/dL Final   • Sodium 06/28/2019 142  137 - 145 mmol/L Final   • Potassium 06/28/2019 4.1  3.4 - 5.0 mmol/L Final   • Chloride 06/28/2019 104  101 - 112 mmol/L Final   • CO2 06/28/2019 27.0  22.0 - 30.0 mmol/L Final   • Calcium 06/28/2019 9.3  8.4 - 10.2 mg/dL Final   • Total Protein 06/28/2019 7.4  6.3 - 8.6 g/dL Final   • Albumin 06/28/2019 4.30  3.50 - 5.00 g/dL Final   • ALT (SGPT) 06/28/2019 24  <=35 U/L Final   • AST (SGOT) 06/28/2019 23  14 - 36 U/L Final   • Alkaline Phosphatase 06/28/2019 64  38 - 126 U/L Final   • Total Bilirubin 06/28/2019 0.6  0.2 - 1.3 mg/dL Final   • eGFR Non African Amer 06/28/2019 92  77 - 604  mL/min/1.73 Final   • Globulin 06/28/2019 3.1  2.3 - 3.5 gm/dL Final   • A/G Ratio 06/28/2019 1.4  1.1 - 1.8 g/dL Final   • BUN/Creatinine Ratio 06/28/2019 15.6  7.0 - 25.0 Final   • Anion Gap 06/28/2019 11.0  5.0 - 15.0 mmol/L Final   • WBC 06/28/2019 5.69  3.40 - 10.80 10*3/mm3 Final   • RBC 06/28/2019 4.38  3.77 - 5.28 10*6/mm3 Final   • Hemoglobin 06/28/2019 13.6  12.0 - 15.9 g/dL Final   • Hematocrit 06/28/2019 40.1  34.0 - 46.6 % Final   • MCV 06/28/2019 91.6  79.0 - 97.0 fL Final   • MCH 06/28/2019 31.1  26.6 - 33.0 pg Final   • MCHC 06/28/2019 33.9  31.5 - 35.7 g/dL Final   • RDW 06/28/2019 13.0  12.3 - 15.4 % Final   • RDW-SD 06/28/2019 42.3  37.0 - 54.0 fl Final   • MPV 06/28/2019 10.3  6.0 - 12.0 fL Final   • Platelets 06/28/2019 286  140 - 450 10*3/mm3 Final   • Neutrophil % 06/28/2019 55.2  42.7 - 76.0 % Final   • Lymphocyte % 06/28/2019 29.7  19.6 - 45.3 % Final   • Monocyte % 06/28/2019 12.8* 5.0 - 12.0 % Final   • Eosinophil % 06/28/2019 1.9  0.3 - 6.2 % Final   • Basophil % 06/28/2019 0.4  0.0 - 1.5 % Final   • Neutrophils, Absolute 06/28/2019 3.14  1.70 - 7.00 10*3/mm3 Final   • Lymphocytes, Absolute 06/28/2019 1.69  0.70 - 3.10 10*3/mm3 Final   • Monocytes, Absolute 06/28/2019 0.73  0.10 - 0.90 10*3/mm3 Final   • Eosinophils, Absolute 06/28/2019 0.11  0.00 - 0.40 10*3/mm3 Final   • Basophils, Absolute 06/28/2019 0.02  0.00 - 0.20 10*3/mm3 Final   Lab on 03/22/2019   Component Date Value Ref Range Status   • Insulin, Free 03/22/2019 17  uU/mL Final    Reference Range:  Pubertal Children and  Adults (fasting): 0 - 17   • Insulin 03/22/2019 17  uU/mL Final    Non-Diabetic:  In the absence of insulin-binding antibodies,  the free and total insulin assays are equivalent. However,  this assay is intended for use in diabetics with insulin  autoantibody present. Measurement is performed on  acid-treated samples and, therefore, the sensitivity and  absolute values by this method may differ from our  direct  insulin ICMA.  Insulin Dependent Diabetic Patients:  Free Insulin levels  vary depending on the capacity and affinity of circulating  insulin-binding antibodies and the dose of insulin given to  the patient.  Total insulin levels represent free insulin  and antibody bound insulin fractions.   Office Visit on 03/22/2019   Component Date Value Ref Range Status   • Glucose 03/22/2019 89  74 - 99 mg/dL Final   • BUN 03/22/2019 14  7 - 17 mg/dL Final   • Creatinine 03/22/2019 0.74  0.52 - 1.04 mg/dL Final   • Sodium 03/22/2019 138  137 - 145 mmol/L Final   • Potassium 03/22/2019 4.1  3.4 - 5.0 mmol/L Final   • Chloride 03/22/2019 102  98 - 107 mmol/L Final   • CO2 03/22/2019 27.0  22.0 - 30.0 mmol/L Final   • Calcium 03/22/2019 9.0  8.4 - 10.2 mg/dL Final   • Total Protein 03/22/2019 7.2  6.3 - 8.2 g/dL Final   • Albumin 03/22/2019 4.20  3.50 - 5.00 g/dL Final   • ALT (SGPT) 03/22/2019 23  <=35 U/L Final   • AST (SGOT) 03/22/2019 20  14 - 36 U/L Final   • Alkaline Phosphatase 03/22/2019 57  38 - 126 U/L Final   • Total Bilirubin 03/22/2019 0.5  0.2 - 1.3 mg/dL Final   • eGFR Non African Amer 03/22/2019 97  71 - 165 mL/min/1.73 Final   • Globulin 03/22/2019 3.0  2.3 - 3.5 gm/dL Final   • A/G Ratio 03/22/2019 1.4  1.1 - 1.8 g/dL Final   • BUN/Creatinine Ratio 03/22/2019 18.9  7.0 - 25.0 Final   • Anion Gap 03/22/2019 9.0  5.0 - 15.0 mmol/L Final   • Total Cholesterol 03/22/2019 169  150 - 200 mg/dL Final   • Triglycerides 03/22/2019 82  <=150 mg/dL Final   • HDL Cholesterol 03/22/2019 51  40 - 59 mg/dL Final   • LDL Cholesterol  03/22/2019 102* <=100 mg/dL Final   • VLDL Cholesterol 03/22/2019 16.4  mg/dL Final   • LDL/HDL Ratio 03/22/2019 1.99  0.00 - 3.22 Final   • Free T4 03/22/2019 1.12  0.78 - 2.19 ng/dL Final   • TSH 03/22/2019 1.350  0.460 - 4.680 mIU/mL Final   • T3, Total 03/22/2019 146.0  97.0 - 169.0 ng/dl Final   • Hemoglobin A1C 03/22/2019 5.5  4 - 5.6 % Final   • WBC 03/22/2019 8.25  3.20 - 9.80  10*3/mm3 Final   • RBC 03/22/2019 4.28  3.77 - 5.16 10*6/mm3 Final   • Hemoglobin 03/22/2019 12.7  12.0 - 15.5 g/dL Final   • Hematocrit 03/22/2019 39.0  35.0 - 45.0 % Final   • MCV 03/22/2019 91.1  80.0 - 98.0 fL Final   • MCH 03/22/2019 29.7  26.5 - 34.0 pg Final   • MCHC 03/22/2019 32.6  31.4 - 36.0 g/dL Final   • RDW 03/22/2019 12.9  11.5 - 14.5 % Final   • RDW-SD 03/22/2019 41.8  36.4 - 46.3 fl Final   • MPV 03/22/2019 10.3  8.0 - 12.0 fL Final   • Platelets 03/22/2019 257  150 - 450 10*3/mm3 Final   • Neutrophil % 03/22/2019 67.3  37.0 - 80.0 % Final   • Lymphocyte % 03/22/2019 22.1  10.0 - 50.0 % Final   • Monocyte % 03/22/2019 9.7  0.0 - 12.0 % Final   • Eosinophil % 03/22/2019 0.8  0.0 - 7.0 % Final   • Basophil % 03/22/2019 0.1  0.0 - 2.0 % Final   • Neutrophils, Absolute 03/22/2019 5.55  2.00 - 8.60 10*3/mm3 Final   • Lymphocytes, Absolute 03/22/2019 1.82  0.60 - 4.20 10*3/mm3 Final   • Monocytes, Absolute 03/22/2019 0.80  0.00 - 0.90 10*3/mm3 Final   • Eosinophils, Absolute 03/22/2019 0.07  0.00 - 0.70 10*3/mm3 Final   • Basophils, Absolute 03/22/2019 0.01  0.00 - 0.20 10*3/mm3 Final   ]

## 2019-06-29 RX ORDER — PHENTERMINE HYDROCHLORIDE 37.5 MG/1
37.5 TABLET ORAL
Qty: 30 TABLET | Refills: 0 | Status: SHIPPED | OUTPATIENT
Start: 2019-06-29 | End: 2019-07-02 | Stop reason: SDUPTHER

## 2019-07-02 RX ORDER — PHENTERMINE HYDROCHLORIDE 37.5 MG/1
37.5 TABLET ORAL
Qty: 30 TABLET | Refills: 0 | Status: SHIPPED | OUTPATIENT
Start: 2019-07-02 | End: 2019-08-30 | Stop reason: SDUPTHER

## 2019-07-08 ENCOUNTER — OFFICE VISIT (OUTPATIENT)
Dept: OTOLARYNGOLOGY | Facility: CLINIC | Age: 24
End: 2019-07-08

## 2019-07-08 VITALS — WEIGHT: 269.4 LBS | RESPIRATION RATE: 18 BRPM | BODY MASS INDEX: 42.28 KG/M2 | HEIGHT: 67 IN

## 2019-07-08 DIAGNOSIS — B36.9 ACUTE FUNGAL OTITIS EXTERNA: Primary | ICD-10-CM

## 2019-07-08 DIAGNOSIS — H62.40 ACUTE FUNGAL OTITIS EXTERNA: Primary | ICD-10-CM

## 2019-07-08 PROCEDURE — 92504 EAR MICROSCOPY EXAMINATION: CPT | Performed by: OTOLARYNGOLOGY

## 2019-07-08 PROCEDURE — 99203 OFFICE O/P NEW LOW 30 MIN: CPT | Performed by: OTOLARYNGOLOGY

## 2019-07-08 RX ORDER — CLOTRIMAZOLE 1 G/ML
SOLUTION TOPICAL
Qty: 10 ML | Refills: 0 | Status: SHIPPED | OUTPATIENT
Start: 2019-07-08 | End: 2019-07-26

## 2019-07-11 NOTE — PROGRESS NOTES
Subjective   Alka Albert is a 24 y.o. female.     History of Present Illness   Patient is history of chronic otitis externa.  Has not been seen in over 3 years.  Has had ear pain in the last month.  This is bilateral but started in the right ear first.  Feels like her hearing is transiently decreased.  Has some nasal congestion.  No previous otologic surgery.  Does use Q-tips.  Nothing in particular brought this on.  Was apparently seen elsewhere and told she may have a ruptured eardrum.      The following portions of the patient's history were reviewed and updated as appropriate: allergies, current medications, past family history, past medical history, past social history, past surgical history and problem list.      Alka Albert reports that she has never smoked. She has never used smokeless tobacco. She reports that she does not drink alcohol or use drugs.  Patient is not a tobacco user and has not been counseled for use of tobacco products    Family History   Problem Relation Age of Onset   • No Known Problems Mother    • No Known Problems Father    • Diabetes Sister        Allergies   Allergen Reactions   • Bactrim [Sulfamethoxazole-Trimethoprim] Hives   • Cephalosporins Hives   • Rocephin [Ceftriaxone] Hives         Current Outpatient Medications:   •  amLODIPine (NORVASC) 10 MG tablet, Take 1 tablet by mouth Daily., Disp: 30 tablet, Rfl: 5  •  dicyclomine (BENTYL) 10 MG capsule, Take 1 capsule by mouth 4 (Four) Times a Day Before Meals & at Bedtime., Disp: 120 capsule, Rfl: 5  •  metFORMIN (GLUCOPHAGE) 500 MG tablet, Take 1 by mouth with food every other day for hyperinsulinemia, Disp: 15 tablet, Rfl: 5  •  ofloxacin (OCUFLOX) 0.3 %, Administer 1 drop into both ears 4 (Four) Times a Day., Disp: , Rfl:   •  phentermine (ADIPEX-P) 37.5 MG tablet, Take 1 tablet by mouth Every Morning Before Breakfast. Fill when due, Disp: 30 tablet, Rfl: 0  •  clotrimazole (LOTRIMIN) 1 % external solution, Instill 3 drops  in each each ear twice a day., Disp: 10 mL, Rfl: 0    Past Medical History:   Diagnosis Date   • IBS (irritable bowel syndrome)    • Infertility, female    • PCOS (polycystic ovarian syndrome)    • Seasonal allergies        No past surgical history on file.      Review of Systems   Constitutional: Negative for fever.   HENT: Positive for congestion, ear discharge, ear pain and sore throat.    All other systems reviewed and are negative.          Objective   Physical Exam  General: Well-developed well-nourished female in no acute distress.  Alert and oriented x-3. Head: Normocephalic. Face: Symmetrical strength and appearance. PERRL. EOMI. Voice:Strong. Speech:Fluent  Ears: External ears no deformity, both ear canals show fungal discharge with matted whitish-gray material.  This is cleaned under the microscope.  Both tympanic membranes appear to be intact.  Clotrimazole is instilled bilaterally.  Nose: Nares show no discharge mass polyp or purulence.  Boggy mucosa is present.  No gross external deformity.  Septum: Midline  Oral cavity: Lips and gums without lesions.  Tongue and floor of mouth without lesions.  Parotid and submandibular ducts unobstructed.  No mucosal lesions on the buccal mucosa or vestibule of the mouth.  Pharynx: No erythema exudate mass or ulcer.  2+ tonsils present  Neck: No lymphadenopathy.  No thyromegaly.  Trachea and larynx midline.  No masses in the parotid or submandibular glands.        Assessment/Plan   Alka was seen today for earache.    Diagnoses and all orders for this visit:    Acute fungal otitis externa    Other orders  -     clotrimazole (LOTRIMIN) 1 % external solution; Instill 3 drops in each each ear twice a day.        Plan: Ears cleaned as described above.  Advise no further Q-tip use.  Prescribed Lotrimin 3 drops each ear twice a day.  Return in 2 weeks at [place] and call sooner for problems.

## 2019-07-25 DIAGNOSIS — Z23 NEED FOR TETANUS BOOSTER: ICD-10-CM

## 2019-07-25 DIAGNOSIS — Z23 NEED FOR HPV VACCINATION: Primary | ICD-10-CM

## 2019-07-25 DIAGNOSIS — Z23 NEED FOR HEPATITIS A VACCINATION: ICD-10-CM

## 2019-07-25 PROCEDURE — 90715 TDAP VACCINE 7 YRS/> IM: CPT | Performed by: NURSE PRACTITIONER

## 2019-07-25 PROCEDURE — 90472 IMMUNIZATION ADMIN EACH ADD: CPT | Performed by: NURSE PRACTITIONER

## 2019-07-25 PROCEDURE — 90471 IMMUNIZATION ADMIN: CPT | Performed by: NURSE PRACTITIONER

## 2019-07-25 PROCEDURE — 90632 HEPA VACCINE ADULT IM: CPT | Performed by: NURSE PRACTITIONER

## 2019-07-25 PROCEDURE — 90651 9VHPV VACCINE 2/3 DOSE IM: CPT | Performed by: NURSE PRACTITIONER

## 2019-07-29 ENCOUNTER — OFFICE VISIT (OUTPATIENT)
Dept: FAMILY MEDICINE CLINIC | Facility: CLINIC | Age: 24
End: 2019-07-29

## 2019-07-29 VITALS
HEIGHT: 67 IN | SYSTOLIC BLOOD PRESSURE: 128 MMHG | TEMPERATURE: 98.6 F | WEIGHT: 268 LBS | OXYGEN SATURATION: 99 % | HEART RATE: 114 BPM | DIASTOLIC BLOOD PRESSURE: 82 MMHG | BODY MASS INDEX: 42.06 KG/M2

## 2019-07-29 DIAGNOSIS — E66.01 CLASS 3 SEVERE OBESITY DUE TO EXCESS CALORIES WITHOUT SERIOUS COMORBIDITY WITH BODY MASS INDEX (BMI) OF 40.0 TO 44.9 IN ADULT (HCC): Primary | Chronic | ICD-10-CM

## 2019-07-29 DIAGNOSIS — E16.1 HYPERINSULINEMIA: Chronic | ICD-10-CM

## 2019-07-29 PROCEDURE — 99213 OFFICE O/P EST LOW 20 MIN: CPT | Performed by: NURSE PRACTITIONER

## 2019-07-29 NOTE — PROGRESS NOTES
"Chief Complaint   Patient presents with   • Weight Loss     1 month follow up      Subjective   Alka Albert is a 24 y.o. female who presents to the office monitored weight loss trial.     The following portions of the patient's history were reviewed and updated as appropriate: allergies, current medications, past family history, past medical history, past social history, past surgical history and problem list.    History of Present Illness   Obesity/ Weight management:  Starting weight 278#   4/30/19 She has lost 1 pounds in March from 3/22/19 (278#)  to 4/30/19. She is to see Dr Rishi Mera, bariatric surgery, in [place] on 6/11/19.  5/24/19 Has lost 6 pounds from 4/30/19 visit to today  Total weight loss is now 7 pounds since 3/22/19 and BMI is 42.5   6/28/19 Weight stable at 271 same as on 5/24/19. BMI 42.4   7/29/19  Weight today is 268, down 3 pounds from last visit 6/28/19. BMI now 42.0  Total weight loss since 3/22/19 is 10 pounds     Continued weight loss behaviors, dietary efforts:  She is continuing to  putting her fork down between bites and chewing each bite thoroughly.  She continues best efforts at dietary control, limiting calories and reducing concentrated sugars as before.   She brings her diet diary in. She is utilizing the \"[employer] pal\" smart phone meg and her step meg.   Utilizing My Plate meg and entering everything that she eats.    She has her My Fitness Pal meg which confirms caloric intake of 2356-0101 Kcals per day consistently.   6/28/19- began phentermine 15mg po daily on 3/22/19, reported was still hungry by 2pm. She was using the phentermine inconsistently and eventually she did complete the 15 mg tablets and filled the 37.5mg tablets at first of May with better results with weight loss.   6/28/19 Continued higher dose phentermine   7/29/19-8/28/19 holiday from phentermine, began Victoza on 7/29/19    She is to continue current weight loss behaviors and also a low dose metformin " was started in June 2019 as last insulin level was 17.   She reports she is taking the phentermine only every other day because it makes her heart rate faster. Due to this, I had extended the 3 month cycle a little to account for less than daily use. 7/29/19 she is to be off phentermine this month, reevaluate in August. Does request to resume Victoza.     She is still trying to keep diet at 1200 farshad daily, but has failed multiple days each week.  She is to increase efforts to count calories and limit to 1200 per day. Will add Victoza.         Continued weight loss behavior, exercise efforts :  Has increased her water intake to 65 ounces of water daily or greater.   She is now spending 30 minutes daily walking.   She has eliminated sweets and sodas from her diet.    She has reduced her total number of carbohydrates consumed to not more than 50 per meal and not more than 15 per snack.   Carb intake demonstrated on My Fitness Pal [place] averages about 40  carbs per day over past 2 weeks, consistently.   She also intends to take the stairs more frequently and increase the pace during her daily walks.    She is temporarily unable to count steps due to her watch breaking, obtained a new one and as of 5/24/19 had resumed counting steps.  5/24/19 She is getting on average 7000 steps daily since 4/30/19 and occasionally hits 10,000 steps daily during each week.  6/28/ 19 was to continue above weight loss behaviors.   7/29/19 she is to try harder to meet and not exceed her calorie goals, continue all goals above.   For upcoming month she is to continue all these behaviors.     Hyperinsulinemia of PCOS/ metabolic syndrome: Last insulin level=17 on 3/22/19.  tolerating metformin 500mg po daily with meal every other day.  HTN: intolerant of drowsiness associated with labetalol. No longer desiring pregnancy and using birth control, she is now controlled well with amlodipine 10mg daily.      Past Medical History:   Diagnosis Date   •  "IBS (irritable bowel syndrome)    • Infertility, female    • PCOS (polycystic ovarian syndrome)    • Seasonal allergies           Family History   Problem Relation Age of Onset   • No Known Problems Mother    • No Known Problems Father    • Diabetes Sister         Review of Systems   Constitutional: Negative.  Negative for appetite change, chills, fatigue, fever and unexpected weight change.        Obesity.   HENT: Negative for congestion, ear pain, rhinorrhea and sore throat.    Eyes: Negative.  Negative for pain.   Respiratory: Negative.  Negative for cough, chest tightness and shortness of breath.    Cardiovascular: Negative.  Negative for chest pain and palpitations.        Blood pressure rising again with mild diastolic hypertension, has not required the labetalol for quite a while   Gastrointestinal: Negative.  Negative for abdominal pain, constipation and nausea.   Endocrine: Negative.         Hyperinsulinemia not currently treated with metformin   Genitourinary: Negative.  Negative for dysuria.   Musculoskeletal: Negative.  Negative for back pain, joint swelling and neck pain.   Skin: Negative.  Negative for color change, pallor, rash and wound.   Allergic/Immunologic: Negative.    Neurological: Negative.  Negative for dizziness and headaches.   Hematological: Negative.    Psychiatric/Behavioral: Negative.  Negative for sleep disturbance and suicidal ideas.   All other systems reviewed and are negative.      Objective   Vitals:    07/29/19 1138   BP: 128/82   Pulse: 114   Temp: 98.6 °F (37 °C)   TempSrc: Temporal   SpO2: 99%   Weight: 122 kg (268 lb)   Height: 170.2 cm (67\")   PainSc: 0-No pain     Physical Exam   Constitutional: She is oriented to person, place, and time. She appears well-developed and well-nourished.   HENT:   Head: Normocephalic and atraumatic.   Eyes: Conjunctivae are normal. Pupils are equal, round, and reactive to light.   Neck: Normal range of motion. Neck supple.   Cardiovascular: " Normal rate, regular rhythm, normal heart sounds and intact distal pulses. Exam reveals no gallop and no friction rub.   No murmur heard.  Pulmonary/Chest: Effort normal and breath sounds normal. No respiratory distress. She has no wheezes. She has no rales. She exhibits no tenderness.   Abdominal: Soft. Bowel sounds are normal.   Musculoskeletal: Normal range of motion. She exhibits no edema, tenderness or deformity.   Lymphadenopathy:     She has no cervical adenopathy.   Neurological: She is alert and oriented to person, place, and time.   Skin: Skin is warm and dry. Capillary refill takes 2 to 3 seconds. No erythema. No pallor.   Psychiatric: She has a normal mood and affect. Her behavior is normal. Judgment and thought content normal.   Nursing note and vitals reviewed.      Assessment/Plan   Alka was seen today for weight loss.    Diagnoses and all orders for this visit:    Class 3 severe obesity due to excess calories without serious comorbidity with body mass index (BMI) of 40.0 to 44.9 in adult (CMS/[facility])  Comments:  this is 3rd of 3 months consecutive on phentermine, due for holiday, desires Victoza due to hyperinsulinemia/ obesity, has used before with weight loss.   Orders:  -     Dulaglutide 0.75 MG/0.5ML solution pen-injector; Inject 0.75 mg under the skin into the appropriate area as directed 1 (One) Time Per Week.    Hyperinsulinemia  Comments:  managed with metformin, will add Victoza.  Orders:  -     Dulaglutide 0.75 MG/0.5ML solution pen-injector; Inject 0.75 mg under the skin into the appropriate area as directed 1 (One) Time Per Week.           PHQ-2/PHQ-9 Depression Screening 6/28/2019   Little interest or pleasure in doing things 0   Feeling down, depressed, or hopeless 0   Total Score 0       KRYSTA Vizcaino         Return in about 4 weeks (around 8/26/2019).    There are no Patient Instructions on file for this visit.

## 2019-07-30 DIAGNOSIS — Z79.2 PROPHYLACTIC ANTIBIOTIC: Primary | ICD-10-CM

## 2019-07-30 RX ORDER — CIPROFLOXACIN 500 MG/1
500 TABLET, FILM COATED ORAL 2 TIMES DAILY
Qty: 2 TABLET | Refills: 0 | Status: SHIPPED | OUTPATIENT
Start: 2019-07-30 | End: 2019-07-30

## 2019-07-30 RX ORDER — CIPROFLOXACIN 500 MG/1
500 TABLET, FILM COATED ORAL 2 TIMES DAILY
Qty: 2 TABLET | Refills: 0 | Status: SHIPPED | OUTPATIENT
Start: 2019-07-30 | End: 2019-08-22

## 2019-08-22 ENCOUNTER — OFFICE VISIT (OUTPATIENT)
Dept: BARIATRICS/WEIGHT MGMT | Facility: CLINIC | Age: 24
End: 2019-08-22

## 2019-08-22 ENCOUNTER — APPOINTMENT (OUTPATIENT)
Dept: BARIATRICS/WEIGHT MGMT | Facility: HOSPITAL | Age: 24
End: 2019-08-22

## 2019-08-22 VITALS
TEMPERATURE: 98.1 F | DIASTOLIC BLOOD PRESSURE: 98 MMHG | HEIGHT: 64 IN | WEIGHT: 271.4 LBS | BODY MASS INDEX: 46.33 KG/M2 | SYSTOLIC BLOOD PRESSURE: 150 MMHG | OXYGEN SATURATION: 100 % | HEART RATE: 87 BPM

## 2019-08-22 DIAGNOSIS — E66.01 CLASS 3 SEVERE OBESITY DUE TO EXCESS CALORIES WITH SERIOUS COMORBIDITY AND BODY MASS INDEX (BMI) OF 45.0 TO 49.9 IN ADULT (HCC): ICD-10-CM

## 2019-08-22 DIAGNOSIS — I10 ESSENTIAL HYPERTENSION: ICD-10-CM

## 2019-08-22 DIAGNOSIS — Z01.818 PREOP EXAMINATION: Primary | ICD-10-CM

## 2019-08-22 PROCEDURE — 99204 OFFICE O/P NEW MOD 45 MIN: CPT | Performed by: SURGERY

## 2019-08-22 NOTE — PROGRESS NOTES
Patient Care Team:  Luz Elena Hanna APRN as PCP - General (Family Medicine)    Reason for Visit:  Surgical Weight loss    Subjective     Patient is a 24 y.o. female presents with morbid obesity and her Body mass index is 46.22 kg/m².     She is here for discussion of surgical weight loss options.  She stated she has been with the disease of obesity for year(s).  She stated she suffers from pretension, polycystic ovarian syndrome and morbid obesity due to her weight gain.  She stated that weight loss helps alleviate these symptoms.   She stated that she has tried multiple dietary regimens including high-protein diets, low-fat diets, low carbohydrate diets, the Atkins diet, fasting and counting calories to help with weight loss.  She stated that she has attempted these conservative methods for weight loss without maintaining long term success.  Today she would like to discuss surgical weight loss options such as the Laparoscopic Sleeve Gastrectomy or the Laparoscopic R - Y Gastric Bypass.     Review of Systems  General ROS: negative  Psychological ROS: negative  Ophthalmic ROS: negative  ENT ROS: negative  Allergy and Immunology ROS: positive for - seasonal allergies  Endocrine ROS: negative  Respiratory ROS: no cough, shortness of breath, or wheezing  Cardiovascular ROS: no chest pain or dyspnea on exertion  Gastrointestinal ROS: no abdominal pain, change in bowel habits, or black or bloody stools  Genito-Urinary ROS: no dysuria, trouble voiding, or hematuria  Musculoskeletal ROS: negative    History  Past Medical History:   Diagnosis Date   • IBS (irritable bowel syndrome)    • Infertility, female    • PCOS (polycystic ovarian syndrome)    • Seasonal allergies      No past surgical history on file.  Family History   Problem Relation Age of Onset   • Cancer Mother         Ovary Displasia    • No Known Problems Father    • Diabetes Sister      Social History     Tobacco Use   • Smoking status: Never Smoker   •  Smokeless tobacco: Never Used   Substance Use Topics   • Alcohol use: No   • Drug use: No       (Not in a hospital admission)  Allergies:  Bactrim [sulfamethoxazole-trimethoprim]; Cephalosporins; and Rocephin [ceftriaxone]      Current Outpatient Medications:   •  amLODIPine (NORVASC) 10 MG tablet, Take 1 tablet by mouth Daily., Disp: 30 tablet, Rfl: 5  •  dicyclomine (BENTYL) 10 MG capsule, Take 1 capsule by mouth 4 (Four) Times a Day Before Meals & at Bedtime., Disp: 120 capsule, Rfl: 5  •  Dulaglutide 0.75 MG/0.5ML solution pen-injector, Inject 0.75 mg under the skin into the appropriate area as directed 1 (One) Time Per Week., Disp: 2 mL, Rfl: 12  •  metFORMIN (GLUCOPHAGE) 500 MG tablet, Take 1 by mouth with food every other day for hyperinsulinemia, Disp: 15 tablet, Rfl: 5  •  phentermine (ADIPEX-P) 37.5 MG tablet, Take 1 tablet by mouth Every Morning Before Breakfast. Fill when due, Disp: 30 tablet, Rfl: 0    Objective     Vital Signs  Temp:  [98.1 °F (36.7 °C)] 98.1 °F (36.7 °C)  Heart Rate:  [87] 87  BP: (149-150)/() 150/98  Body mass index is 46.22 kg/m².      08/22/19  1317   Weight: 123 kg (271 lb 6.4 oz)       Physical Exam:      HEENT: extra ocular movement intact  Respiratory: appears well, vitals normal, no respiratory distress, acyanotic, normal RR, chest clear, no wheezing, crepitations, rhonchi, normal symmetric air entry  Cardiovascular: Regular rate and rhythm, S1, S2 normal, no murmur, click, rub or gallop  GI: Soft, non-tender, normal bowel sounds; no bruits, organomegaly or masses.  Abnormal shape: obese  Musculoskeletal: inspection - no abnormality  Neurologic: alert, oriented, normal speech, no focal findings or movement disorder noted       Results Review:   None        Assessment/Plan   Encounter Diagnoses   Name Primary?   • Essential hypertension Yes   • Class 3 severe obesity due to excess calories with serious comorbidity and body mass index (BMI) of 45.0 to 49.9 in adult  (CMS/[facility])        I believe this patient will be a good candidate for weight loss surgery.  I have discussed the [place] - Y Gastric Bypass, laparoscopic sleeve gastrectomy and the Laparoscopic Gastric Band procedures.  We discussed the benefits of the surgeries including the benefit of weight loss and the possible reversal of co-morbid conditions associated with morbid obesity. I explained to the patient that prior to making a definitive decision on the type of surgery she will require an esophagogastroduodenoscopy with biopsies to assess for any contraindications for surgical weight loss.  The alternatives  include not doing anything, or pursuing an UGI series which only offers a diagnosis with potential less accuracy compared to EGD. The benefits of the EGD such as identifying the pathology and anatomy of the upper GI system and the complications and risks of the procedure.  The risk of the endoscopy were discussed in detail. We discussed the risk of perforation (one out of 4158-9160, riskier with dilation), bleeding (one out of 500), and the rare risks of infection, adverse reaction to anesthesia, respiratory failure, cardiac failure including MI and adverse reaction to medications, etc. We discussed consequences that could occur if a risk were to develop such as the need for hospitalization, blood transfusion, surgical intervention, medications, pain, disability and death. The patient verbalizes understanding and agrees to proceed. such as bleeding, perforation, swallowing difficulties and gas bloat can occur after this procedure.  Upon completion of our discussion and addressing and answering her questions to her satisfation, informed consent was obtained.  She will be scheduled accordingly for the esophagogastroduodenoscopy procedure.  The patient reports that her hypertension has remained stable on their current treatment regimen.  I anticipate improvement of her hypertension as we correct her morbid obesity.  I  discussed the patient's findings and my recommendations with patient.     I have also recommended that she obtain completion of the 6 months monitor dietary regimen, preoperative laboratory work, psych evaluation and cardiac assessment prior to surgery.    Dr. Rishi Mera MD [facility]    08/22/19  2:06 PM  Patient Care Team:  Luz Elena Hanna APRN as PCP - General (Family Medicine)

## 2019-08-22 NOTE — PROGRESS NOTES
"NUTRITION BARIATRIC/[facility] NOTE     Visit 1  Initial Assessment       Anthropometrics   Height: 64.25 in  Weight: 271 lb 6.4 oz  BMI: 46.2    Nutrition Recall  Eating __2____ meals daily -Eats breakfast only one time per week  Protein lacking at-Breakfast  Snacking-  Sometimes has chips at work, but usually doesn't snack  Limited sweet intake  Large portions-  Problems with bread because \"I love it so much\"   Drinking carbonated beverages- 2-3 Dr. Peppers per week  Drinking less than 64 fluid ounces-  Thinks she is drinking about 32 oz per day of water    Exercise   None    Habits:  None    Education    Goal Setting and Information Packet  4 meals per day diet plan  Reinforce Nutritional Needs for Surgery  Reinforce Nutritional Needs for [facility]    Nutrition Goals   Continue diet changes  Eat ___4__ meals per day with protein  Protein goal: 65gms    discussed protein guidelines for shakes and bar-Pt. May try a protein shake for breakfast instead of skipping  Healthier food choices  Portion control / Use smaller plate or measuring cup   Decrease soda intake to 1 per week  Replace sugar beverages with artifical sweetened-  Suggested tea with Stevia   Increase fluid intake to 64 ounces per day    Exercise Goals  Continue current exercise routine     "

## 2019-08-28 PROBLEM — Z01.818 PREOP EXAMINATION: Status: ACTIVE | Noted: 2019-08-28

## 2019-08-30 ENCOUNTER — OFFICE VISIT (OUTPATIENT)
Dept: FAMILY MEDICINE CLINIC | Facility: CLINIC | Age: 24
End: 2019-08-30

## 2019-08-30 VITALS
DIASTOLIC BLOOD PRESSURE: 76 MMHG | BODY MASS INDEX: 41.44 KG/M2 | HEIGHT: 67 IN | HEART RATE: 96 BPM | WEIGHT: 264 LBS | OXYGEN SATURATION: 99 % | SYSTOLIC BLOOD PRESSURE: 128 MMHG | RESPIRATION RATE: 18 BRPM | TEMPERATURE: 97.2 F

## 2019-08-30 DIAGNOSIS — E66.01 CLASS 3 SEVERE OBESITY DUE TO EXCESS CALORIES WITHOUT SERIOUS COMORBIDITY WITH BODY MASS INDEX (BMI) OF 40.0 TO 44.9 IN ADULT (HCC): Primary | ICD-10-CM

## 2019-08-30 PROCEDURE — 99214 OFFICE O/P EST MOD 30 MIN: CPT | Performed by: NURSE PRACTITIONER

## 2019-08-30 RX ORDER — PHENTERMINE HYDROCHLORIDE 37.5 MG/1
37.5 TABLET ORAL
Qty: 30 TABLET | Refills: 0 | Status: SHIPPED | OUTPATIENT
Start: 2019-08-30 | End: 2019-11-25

## 2019-08-30 NOTE — PROGRESS NOTES
"Chief Complaint   Patient presents with   • Weight Check     6th weight check     Subjective   Alka Albert is a 24 y.o. female who presents to the office for 6th visit of 6 month weight loss trial.    The following portions of the patient's history were reviewed and updated as appropriate: allergies, current medications, past family history, past medical history, past social history, past surgical history and problem list.    History of Present Illness        History of Present Illness   Obesity/ Weight management:  Starting weight 278#   4/30/19 She has lost 1 pounds in March from 3/22/19 (278#)  to 4/30/19. She is to see Dr Rishi Mera, bariatric surgery, in [place] on 6/11/19.  5/24/19 Has lost 6 pounds from 4/30/19 visit to today  Total weight loss is now 7 pounds since 3/22/19 and BMI is 42.5   6/28/19 Weight stable at 271 same as on 5/24/19. BMI 42.4   7/29/19  Weight today is 268, down 3 pounds from last visit 6/28/19. BMI now 42.0  Total weight loss since 3/22/19 is 10 pounds   8/30/19 Weight today is 264, down 4lbs since last visit 7/29/19. BMI now 41.3 Total weight loss since 3/22/19 is 14 pounds.   Continued weight loss behaviors, dietary efforts:  She is continuing to  putting her fork down between bites and chewing each bite thoroughly.  She continues best efforts at dietary control, limiting calories and reducing concentrated sugars as before.   She brings her diet diary in. She is utilizing the \"[employer] pal\" smart phone meg and her step meg.   Utilizing My Plate meg and entering everything that she eats.    She has her My Fitness Pal meg which confirms caloric intake of 8979-1086 Kcals per day consistently.   6/28/19- began phentermine 15mg po daily on 3/22/19, reported was still hungry by 2pm. She was using the phentermine inconsistently and eventually she did complete the 15 mg tablets and filled the 37.5mg tablets at first of May with better results with weight loss.   6/28/19 Continued " higher dose phentermine   7/29/19-8/28/19 holiday from phentermine, began Victoza on 7/29/19. Requests refill of phentermine today.      She is to continue current weight loss behaviors and also a low dose metformin was started in June 2019 as last insulin level was 17. She reports compliance with metformin.      She is still trying to keep diet at 1200 farshad daily, but has failed multiple days each week.  She is to increase efforts to count calories and limit to 1200 per day. Will add Victoza.    Currently following diet started by Dr. Mera, no more than 65 grams of carbs per day. inputting foods into [employer] pal.  Reports compliance with diet since Monday. Goal for this month is stop drinking diet sodas.     Continued weight loss behavior, exercise efforts :  Has increased her water intake to 65 ounces of water daily or greater.   She is now spending 30 minutes daily walking.   She has eliminated sweets and sodas from her diet.    She has reduced her total number of carbohydrates consumed to not more than 50 per meal and not more than 15 per snack.   Carb intake demonstrated on [employer] Pal [place] averages about 40  carbs per day over past 2 weeks, consistently.   She also intends to take the stairs more frequently and increase the pace during her daily walks.    She is temporarily unable to count steps due to her watch breaking, obtained a new one and as of 5/24/19 had resumed counting steps.  5/24/19 She is getting on average 7000 steps daily since 4/30/19 and occasionally hits 10,000 steps daily during each week.  6/28/ 19 was to continue above weight loss behaviors.   7/29/19 she is to try harder to meet and not exceed her calorie goals, continue all goals above.   For upcoming month she is to continue all these behaviors.  8/30/19: Reports getting 10,000 steps per day and working around house.            Past Medical History:   Diagnosis Date   • IBS (irritable bowel syndrome)    • Infertility, female    •  "PCOS (polycystic ovarian syndrome)    • Seasonal allergies           Family History   Problem Relation Age of Onset   • Cancer Mother         Ovary Displasia    • No Known Problems Father    • Diabetes Sister         Review of Systems   Constitutional: Negative.  Negative for appetite change, chills, fatigue, fever and unexpected weight change.        Obesity.   HENT: Negative for congestion, ear pain, rhinorrhea and sore throat.    Eyes: Negative.  Negative for pain.   Respiratory: Negative.  Negative for cough, chest tightness and shortness of breath.    Cardiovascular: Negative.  Negative for chest pain and palpitations.   Gastrointestinal: Negative.  Negative for abdominal pain, constipation and nausea.   Endocrine: Negative.         Hyperinsulinemia, treated with metformin.   Genitourinary: Negative.  Negative for dysuria.   Musculoskeletal: Negative.  Negative for back pain, joint swelling and neck pain.   Skin: Negative.  Negative for color change, pallor, rash and wound.   Allergic/Immunologic: Negative.    Neurological: Negative.  Negative for dizziness and headaches.   Hematological: Negative.    Psychiatric/Behavioral: Negative.  Negative for sleep disturbance and suicidal ideas.   All other systems reviewed and are negative.      Objective   Vitals:    08/30/19 1143   BP: 128/76   BP Location: Left arm   Patient Position: Sitting   Pulse: 96   Resp: 18   Temp: 97.2 °F (36.2 °C)   SpO2: 99%   Weight: 120 kg (264 lb)   Height: 170.2 cm (67.01\")   PainSc: 0-No pain     Physical Exam   Constitutional: She is oriented to person, place, and time. She appears well-developed and well-nourished.   HENT:   Head: Normocephalic and atraumatic.   Eyes: Conjunctivae are normal. Pupils are equal, round, and reactive to light.   Neck: Normal range of motion. Neck supple.   Cardiovascular: Normal rate, regular rhythm, normal heart sounds and intact distal pulses. Exam reveals no gallop and no friction rub.   No murmur " heard.  Pulmonary/Chest: Effort normal and breath sounds normal. No respiratory distress. She has no wheezes. She has no rales. She exhibits no tenderness.   Abdominal: Soft. Bowel sounds are normal.   Musculoskeletal: Normal range of motion. She exhibits no edema, tenderness or deformity.   Lymphadenopathy:     She has no cervical adenopathy.   Neurological: She is alert and oriented to person, place, and time.   Skin: Skin is warm and dry. Capillary refill takes 2 to 3 seconds. No erythema. No pallor.   Psychiatric: She has a normal mood and affect. Her behavior is normal. Judgment and thought content normal.   Nursing note and vitals reviewed.      Assessment/Plan   Alka was seen today for weight check.    Diagnoses and all orders for this visit:    Class 3 severe obesity due to excess calories without serious comorbidity with body mass index (BMI) of 40.0 to 44.9 in adult (CMS/[facility])    Other orders  -     phentermine (ADIPEX-P) 37.5 MG tablet; Take 1 tablet by mouth Every Morning Before Breakfast. Fill when due           PHQ-2/PHQ-9 Depression Screening 6/28/2019   Little interest or pleasure in doing things 0   Feeling down, depressed, or hopeless 0   Total Score 0   Patient understands the risks associated with this controlled medication, including tolerance and addiction.  Patient also agrees to only obtain this medication from me, and not from a another provider, unless that provider is covering for me in my absence.  Patient also agrees to be compliant in dosing, and not self adjust the dose of medication.  A signed controlled substance agreement is on file, and the patient has received a controlled substance education sheet at this a previous visit.  The patient has also signed a consent for treatment with a controlled substance as per [facility] policy. RA was obtained.    KRYSTA Vizcaino         Return in about 4 weeks (around 9/27/2019).    There are no Patient Instructions on file for this  visit.

## 2019-10-04 DIAGNOSIS — H66.002 NON-RECURRENT ACUTE SUPPURATIVE OTITIS MEDIA OF LEFT EAR WITHOUT SPONTANEOUS RUPTURE OF TYMPANIC MEMBRANE: Primary | ICD-10-CM

## 2019-10-04 RX ORDER — AZITHROMYCIN 250 MG/1
TABLET, FILM COATED ORAL
Qty: 6 TABLET | Refills: 0 | Status: SHIPPED | OUTPATIENT
Start: 2019-10-04 | End: 2019-11-25

## 2019-10-25 DIAGNOSIS — M54.42 ACUTE LEFT-SIDED LOW BACK PAIN WITH LEFT-SIDED SCIATICA: Primary | ICD-10-CM

## 2019-10-25 RX ORDER — KETOROLAC TROMETHAMINE 30 MG/ML
60 INJECTION, SOLUTION INTRAMUSCULAR; INTRAVENOUS ONCE
Status: SHIPPED | OUTPATIENT
Start: 2019-10-25 | End: 2019-10-30

## 2019-10-25 RX ORDER — TRIAMCINOLONE ACETONIDE 40 MG/ML
80 INJECTION, SUSPENSION INTRA-ARTICULAR; INTRAMUSCULAR ONCE
Status: DISCONTINUED | OUTPATIENT
Start: 2019-10-25 | End: 2019-11-25

## 2019-10-25 RX ORDER — METHYLPREDNISOLONE 4 MG/1
TABLET ORAL
Qty: 1 EACH | Refills: 0 | Status: SHIPPED | OUTPATIENT
Start: 2019-10-25 | End: 2019-11-25

## 2019-11-25 ENCOUNTER — LAB (OUTPATIENT)
Dept: LAB | Facility: OTHER | Age: 24
End: 2019-11-25

## 2019-11-25 ENCOUNTER — OFFICE VISIT (OUTPATIENT)
Dept: FAMILY MEDICINE CLINIC | Facility: CLINIC | Age: 24
End: 2019-11-25

## 2019-11-25 VITALS
OXYGEN SATURATION: 99 % | SYSTOLIC BLOOD PRESSURE: 128 MMHG | HEIGHT: 67 IN | TEMPERATURE: 98.4 F | WEIGHT: 265 LBS | DIASTOLIC BLOOD PRESSURE: 82 MMHG | BODY MASS INDEX: 41.59 KG/M2 | HEART RATE: 117 BPM | RESPIRATION RATE: 18 BRPM

## 2019-11-25 DIAGNOSIS — I10 ESSENTIAL HYPERTENSION: ICD-10-CM

## 2019-11-25 DIAGNOSIS — Z13.29 SCREENING FOR THYROID DISORDER: ICD-10-CM

## 2019-11-25 DIAGNOSIS — R53.83 FATIGUE, UNSPECIFIED TYPE: ICD-10-CM

## 2019-11-25 DIAGNOSIS — Z13.220 SCREENING FOR HYPERLIPIDEMIA: ICD-10-CM

## 2019-11-25 DIAGNOSIS — E16.1 HYPERINSULINEMIA: Chronic | ICD-10-CM

## 2019-11-25 DIAGNOSIS — F41.1 GAD (GENERALIZED ANXIETY DISORDER): ICD-10-CM
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
## 2019-11-25 DIAGNOSIS — I10 ESSENTIAL HYPERTENSION: Primary | ICD-10-CM

## 2019-11-25 LAB
ALBUMIN SERPL-MCNC: 4.4 G/DL (ref 3.5–5)
ALBUMIN/GLOB SERPL: 1.3 G/DL (ref 1.1–1.8)
ALP SERPL-CCNC: 72 U/L (ref 38–126)
ALT SERPL W P-5'-P-CCNC: 24 U/L
ANION GAP SERPL CALCULATED.3IONS-SCNC: 7 MMOL/L (ref 5–15)
AST SERPL-CCNC: 23 U/L (ref 14–36)
BASOPHILS # BLD AUTO: 0.02 10*3/MM3 (ref 0–0.2)
BASOPHILS NFR BLD AUTO: 0.3 % (ref 0–1.5)
BILIRUB SERPL-MCNC: 0.5 MG/DL (ref 0.2–1.3)
BUN BLD-MCNC: 15 MG/DL (ref 7–23)
BUN/CREAT SERPL: 18.5 (ref 7–25)
CALCIUM SPEC-SCNC: 9.4 MG/DL (ref 8.4–10.2)
CHLORIDE SERPL-SCNC: 102 MMOL/L (ref 101–112)
CHOLEST SERPL-MCNC: 213 MG/DL (ref 150–200)
CO2 SERPL-SCNC: 30 MMOL/L (ref 22–30)
CREAT BLD-MCNC: 0.81 MG/DL (ref 0.52–1.04)
DEPRECATED RDW RBC AUTO: 44.9 FL (ref 37–54)
EOSINOPHIL # BLD AUTO: 0.13 10*3/MM3 (ref 0–0.4)
EOSINOPHIL NFR BLD AUTO: 1.9 % (ref 0.3–6.2)
ERYTHROCYTE [DISTWIDTH] IN BLOOD BY AUTOMATED COUNT: 13.6 % (ref 12.3–15.4)
GFR SERPL CREATININE-BSD FRML MDRD: 87 ML/MIN/1.73 (ref 71–165)
GLOBULIN UR ELPH-MCNC: 3.5 GM/DL (ref 2.3–3.5)
GLUCOSE BLD-MCNC: 94 MG/DL (ref 70–99)
HCT VFR BLD AUTO: 42.1 % (ref 34–46.6)
HDLC SERPL-MCNC: 56 MG/DL (ref 40–59)
HGB BLD-MCNC: 14.1 G/DL (ref 12–15.9)
LDLC SERPL CALC-MCNC: 131 MG/DL
LDLC/HDLC SERPL: 2.34 {RATIO} (ref 0–3.22)
LYMPHOCYTES # BLD AUTO: 1.75 10*3/MM3 (ref 0.7–3.1)
LYMPHOCYTES NFR BLD AUTO: 25.4 % (ref 19.6–45.3)
MCH RBC QN AUTO: 30.9 PG (ref 26.6–33)
MCHC RBC AUTO-ENTMCNC: 33.5 G/DL (ref 31.5–35.7)
MCV RBC AUTO: 92.1 FL (ref 79–97)
MONOCYTES # BLD AUTO: 0.66 10*3/MM3 (ref 0.1–0.9)
MONOCYTES NFR BLD AUTO: 9.6 % (ref 5–12)
NEUTROPHILS # BLD AUTO: 4.32 10*3/MM3 (ref 1.7–7)
NEUTROPHILS NFR BLD AUTO: 62.8 % (ref 42.7–76)
PLATELET # BLD AUTO: 332 10*3/MM3 (ref 140–450)
PMV BLD AUTO: 9.9 FL (ref 6–12)
POTASSIUM BLD-SCNC: 4.1 MMOL/L (ref 3.4–5)
PROT SERPL-MCNC: 7.9 G/DL (ref 6.3–8.6)
RBC # BLD AUTO: 4.57 10*6/MM3 (ref 3.77–5.28)
SODIUM BLD-SCNC: 139 MMOL/L (ref 137–145)
TRIGL SERPL-MCNC: 130 MG/DL
VLDLC SERPL-MCNC: 26 MG/DL
WBC NRBC COR # BLD: 6.88 10*3/MM3 (ref 3.4–10.8)

## 2019-11-25 PROCEDURE — 99213 OFFICE O/P EST LOW 20 MIN: CPT | Performed by: NURSE PRACTITIONER

## 2019-11-25 PROCEDURE — 86618 LYME DISEASE ANTIBODY: CPT | Performed by: NURSE PRACTITIONER

## 2019-11-25 PROCEDURE — 86008 ALLG SPEC IGE RECOMB EA: CPT | Performed by: NURSE PRACTITIONER

## 2019-11-25 PROCEDURE — 86003 ALLG SPEC IGE CRUDE XTRC EA: CPT | Performed by: NURSE PRACTITIONER

## 2019-11-25 PROCEDURE — 84443 ASSAY THYROID STIM HORMONE: CPT | Performed by: NURSE PRACTITIONER

## 2019-11-25 PROCEDURE — 83525 ASSAY OF INSULIN: CPT | Performed by: NURSE PRACTITIONER

## 2019-11-25 PROCEDURE — 82306 VITAMIN D 25 HYDROXY: CPT | Performed by: NURSE PRACTITIONER

## 2019-11-25 PROCEDURE — 36415 COLL VENOUS BLD VENIPUNCTURE: CPT | Performed by: NURSE PRACTITIONER

## 2019-11-25 PROCEDURE — 85025 COMPLETE CBC W/AUTO DIFF WBC: CPT | Performed by: NURSE PRACTITIONER

## 2019-11-25 PROCEDURE — 80053 COMPREHEN METABOLIC PANEL: CPT | Performed by: NURSE PRACTITIONER

## 2019-11-25 PROCEDURE — 83036 HEMOGLOBIN GLYCOSYLATED A1C: CPT | Performed by: NURSE PRACTITIONER

## 2019-11-25 PROCEDURE — 80061 LIPID PANEL: CPT | Performed by: NURSE PRACTITIONER

## 2019-11-25 PROCEDURE — 86666 EHRLICHIA ANTIBODY: CPT | Performed by: NURSE PRACTITIONER

## 2019-11-25 PROCEDURE — 84481 FREE ASSAY (FT-3): CPT | Performed by: NURSE PRACTITIONER

## 2019-11-25 PROCEDURE — 86757 RICKETTSIA ANTIBODY: CPT | Performed by: NURSE PRACTITIONER

## 2019-11-25 PROCEDURE — 82607 VITAMIN B-12: CPT | Performed by: NURSE PRACTITIONER

## 2019-11-25 PROCEDURE — 84436 ASSAY OF TOTAL THYROXINE: CPT | Performed by: NURSE PRACTITIONER

## 2019-11-25 RX ORDER — METOPROLOL SUCCINATE 25 MG/1
25 TABLET, EXTENDED RELEASE ORAL DAILY
Qty: 30 TABLET | Refills: 5 | Status: SHIPPED | OUTPATIENT
Start: 2019-11-25 | End: 2020-01-30 | Stop reason: SDUPTHER

## 2019-11-25 RX ORDER — ESCITALOPRAM OXALATE 10 MG/1
10 TABLET ORAL DAILY
Qty: 30 TABLET | Refills: 0 | Status: SHIPPED | OUTPATIENT
Start: 2019-11-25 | End: 2019-12-03 | Stop reason: SINTOL

## 2019-11-25 NOTE — PROGRESS NOTES
Chief Complaint   Patient presents with   • Hypertension   • Anxiety     Subjective   Alka Albert is a 24 y.o. female who presents to the office for hypertension and anxiety.     The following portions of the patient's history were reviewed and updated as appropriate: allergies, current medications, past family history, past medical history, past social history, past surgical history and problem list.    History of Present Illness   HTN previously managed with PRN labetalol, became ineffective with more regular use, and she felt bad when taking it.   Was started on amlodipine 10mg and while usually fairly well controlled, reports several episodes of blood pressures reading systolic 140's and 150's with a few diastolic elevations as high as 108.   These episodes may occur at rest or with exertion.   Reports using an adult large cuff on automatic meter at home.  Most recent elevation noted last evening, when she was just sitting, doing nothing, began to feel very hot. Checked her blood pressure and was 146/97.  Saturday was 154/106  HR in office today is 117.  Agreeable to try Toprol XL.    Anxiety; new onset. Never had problems with this before, but now is worrying over things she feel do not warrant excessive concern. Wants something to help with this. No historical meds.   Past Medical History:   Diagnosis Date   • IBS (irritable bowel syndrome)    • Infertility, female    • PCOS (polycystic ovarian syndrome)    • Seasonal allergies           Family History   Problem Relation Age of Onset   • Cancer Mother         Ovary Displasia    • No Known Problems Father    • Diabetes Sister         Review of Systems   Constitutional: Negative.  Negative for fever and unexpected weight change.   HENT: Negative.    Eyes: Negative.    Respiratory: Negative.  Negative for cough, chest tightness and shortness of breath.    Cardiovascular: Negative.  Negative for chest pain.        HTN   Gastrointestinal: Negative.    Endocrine:  "Negative.    Genitourinary: Negative.  Negative for dysuria.   Musculoskeletal: Negative.    Skin: Negative.  Negative for color change, pallor, rash and wound.   Allergic/Immunologic: Negative.    Neurological: Negative.    Hematological: Negative.    Psychiatric/Behavioral: Negative for sleep disturbance and suicidal ideas. The patient is nervous/anxious.    All other systems reviewed and are negative.      Objective   Vitals:    11/25/19 1303   BP: 128/82   BP Location: Left arm   Patient Position: Sitting   Cuff Size: Adult   Pulse: 117   Resp: 18   Temp: 98.4 °F (36.9 °C)   SpO2: 99%   Weight: 120 kg (265 lb)   Height: 170.2 cm (67.01\")   PainSc: 0-No pain     Physical Exam   Constitutional: She is oriented to person, place, and time. She appears well-developed and well-nourished.   obesity   HENT:   Head: Normocephalic and atraumatic.   Eyes: Conjunctivae are normal. Pupils are equal, round, and reactive to light.   Neck: Normal range of motion. Neck supple.   Cardiovascular: Normal rate, regular rhythm, normal heart sounds and intact distal pulses. Exam reveals no gallop and no friction rub.   No murmur heard.  Pulmonary/Chest: Effort normal and breath sounds normal. No respiratory distress. She has no wheezes. She has no rales. She exhibits no tenderness.   Abdominal: Soft. Bowel sounds are normal.   Musculoskeletal: Normal range of motion. She exhibits no edema, tenderness or deformity.   Lymphadenopathy:     She has no cervical adenopathy.   Neurological: She is alert and oriented to person, place, and time.   Skin: Skin is warm and dry. Capillary refill takes 2 to 3 seconds. No erythema. No pallor.   Psychiatric: She has a normal mood and affect. Her behavior is normal. Judgment and thought content normal.   Nursing note and vitals reviewed.      Assessment/Plan   Alka was seen today for hypertension and anxiety.    Diagnoses and all orders for this visit:    Essential hypertension  -     metoprolol " succinate XL (TOPROL-XL) 25 MG 24 hr tablet; Take 1 tablet by mouth Daily.    MARLYS (generalized anxiety disorder)  -     escitalopram (LEXAPRO) 10 MG tablet; Take 1 tablet by mouth Daily.           PHQ-2/PHQ-9 Depression Screening 6/28/2019   Little interest or pleasure in doing things 0   Feeling down, depressed, or hopeless 0   Total Score 0       KRYSTA Vizcaino         Return in about 1 month (around 12/25/2019).    There are no Patient Instructions on file for this visit.

## 2019-11-26 DIAGNOSIS — E53.8 B12 DEFICIENCY: Primary | ICD-10-CM

## 2019-11-26 DIAGNOSIS — I10 ESSENTIAL HYPERTENSION: ICD-10-CM

## 2019-11-26 LAB
25(OH)D3 SERPL-MCNC: 34.5 NG/ML (ref 30–100)
HBA1C MFR BLD: 5.62 % (ref 4.8–5.6)
T3FREE SERPL-MCNC: 3.73 PG/ML (ref 2–4.4)
T4 SERPL-MCNC: 6.96 MCG/DL (ref 4.5–11.7)
TSH SERPL DL<=0.05 MIU/L-ACNC: 1.25 UIU/ML (ref 0.27–4.2)
VIT B12 BLD-MCNC: 250 PG/ML (ref 211–946)

## 2019-11-26 RX ORDER — CYANOCOBALAMIN 1000 UG/ML
1000 INJECTION, SOLUTION INTRAMUSCULAR; SUBCUTANEOUS
Status: DISCONTINUED | OUTPATIENT
Start: 2020-01-14 | End: 2020-11-09

## 2019-11-26 RX ORDER — CYANOCOBALAMIN 1000 UG/ML
1000 INJECTION, SOLUTION INTRAMUSCULAR; SUBCUTANEOUS
Status: SHIPPED | OUTPATIENT
Start: 2019-11-26 | End: 2019-12-24

## 2019-11-26 RX ORDER — AMLODIPINE BESYLATE 10 MG/1
10 TABLET ORAL DAILY
Qty: 30 TABLET | Refills: 5 | Status: SHIPPED | OUTPATIENT
Start: 2019-11-26 | End: 2020-01-30 | Stop reason: SDUPTHER

## 2019-11-27 LAB
B BURGDOR IGG SER QL: NEGATIVE
B BURGDOR IGM SER QL: NEGATIVE
INSULIN SERPL-ACNC: 21.3 UIU/ML (ref 2.6–24.9)

## 2019-11-28 LAB
R RICKETTSI IGG SER QL IA: ABNORMAL
R RICKETTSI IGG SER QL IA: POSITIVE
R RICKETTSI IGM TITR SER: 0.64 INDEX (ref 0–0.89)

## 2019-12-02 LAB
A PHAGOCYTOPH IGM TITR SER IF: NEGATIVE {TITER}
CONV HGE IGG TITER: NEGATIVE
E CHAFFEENSIS IGG TITR SER IF: NEGATIVE {TITER}
E. CHAFFEENSIS (HME) IGM TITER: NEGATIVE

## 2019-12-02 RX ORDER — DOXYCYCLINE HYCLATE 100 MG/1
100 CAPSULE ORAL 2 TIMES DAILY
Qty: 28 CAPSULE | Refills: 0 | Status: SHIPPED | OUTPATIENT
Start: 2019-12-02 | End: 2019-12-19

## 2019-12-03 RX ORDER — CITALOPRAM 10 MG/1
10 TABLET ORAL DAILY
Qty: 30 TABLET | Refills: 5 | Status: SHIPPED | OUTPATIENT
Start: 2019-12-03 | End: 2019-12-19 | Stop reason: DRUGHIGH

## 2019-12-06 DIAGNOSIS — R11.0 NAUSEA: ICD-10-CM

## 2019-12-06 DIAGNOSIS — Z91.014 ALLERGY TO BEEF: ICD-10-CM

## 2019-12-06 DIAGNOSIS — Z91.018 ALLERGY TO ALPHA-GAL: Primary | ICD-10-CM

## 2019-12-06 LAB
ALPHA GAL IGE: 0.67 KU/L
BEEF IGE QN: 0.83 KU/L
LAMB IGE QN: 0.43 KU/L
Lab: 1
Lab: 1
Lab: 2
PORK IGE: 0.5 KU/L

## 2019-12-06 RX ORDER — FAMOTIDINE 40 MG/1
40 TABLET, FILM COATED ORAL DAILY
Qty: 90 TABLET | Refills: 1 | Status: SHIPPED | OUTPATIENT
Start: 2019-12-06 | End: 2020-02-10

## 2019-12-06 RX ORDER — MONTELUKAST SODIUM 10 MG/1
10 TABLET ORAL NIGHTLY
Qty: 90 TABLET | Refills: 3 | Status: SHIPPED | OUTPATIENT
Start: 2019-12-06 | End: 2020-02-10

## 2019-12-06 RX ORDER — ONDANSETRON 8 MG/1
8 TABLET, ORALLY DISINTEGRATING ORAL EVERY 8 HOURS PRN
Qty: 90 TABLET | Refills: 0 | Status: SHIPPED | OUTPATIENT
Start: 2019-12-06 | End: 2019-12-06 | Stop reason: SDUPTHER

## 2019-12-06 RX ORDER — EPINEPHRINE 0.3 MG/.3ML
INJECTION SUBCUTANEOUS
Qty: 2 EACH | Refills: 1 | Status: SHIPPED | OUTPATIENT
Start: 2019-12-06 | End: 2021-06-10 | Stop reason: SDUPTHER

## 2019-12-06 RX ORDER — ONDANSETRON 8 MG/1
8 TABLET, ORALLY DISINTEGRATING ORAL EVERY 8 HOURS PRN
Qty: 90 TABLET | Refills: 1 | Status: SHIPPED | OUTPATIENT
Start: 2019-12-06 | End: 2020-04-27 | Stop reason: SDUPTHER

## 2019-12-09 ENCOUNTER — LAB (OUTPATIENT)
Dept: LAB | Facility: OTHER | Age: 24
End: 2019-12-09

## 2019-12-09 DIAGNOSIS — Z91.014 ALLERGY TO BEEF: ICD-10-CM

## 2019-12-09 DIAGNOSIS — Z91.018 ALLERGY TO ALPHA-GAL: ICD-10-CM

## 2019-12-09 PROCEDURE — 86003 ALLG SPEC IGE CRUDE XTRC EA: CPT | Performed by: NURSE PRACTITIONER

## 2019-12-09 PROCEDURE — 36415 COLL VENOUS BLD VENIPUNCTURE: CPT | Performed by: NURSE PRACTITIONER

## 2019-12-14 LAB
CALIF WALNUT POLN IGE QN: 2.94 KU/L
CLAM IGE QN: 1.73 KU/L
CODFISH IGE QN: 0.16 KU/L
CONV CLASS DESCRIPTION: ABNORMAL
CORN IGE QN: 2.62 KU/L
COW MILK IGE QN: 0.57 KU/L
EGG WHITE IGE QN: <0.1 KU/L
PEANUT IGE QN: 3.09 KU/L
SCALLOP IGE QN: 2.13 KU/L
SESAME SEED IGE: 3.18 KU/L
SHRIMP IGE: 1.46 KU/L
SOYBEAN IGE QN: 2.5 KU/L
WHEAT IGE QN: 3.71 KU/L

## 2019-12-16 ENCOUNTER — TELEPHONE (OUTPATIENT)
Dept: FAMILY MEDICINE CLINIC | Facility: CLINIC | Age: 24
End: 2019-12-16

## 2019-12-16 NOTE — TELEPHONE ENCOUNTER
----- Message from KRYSTA Byers sent at 12/16/2019  1:22 PM CST -----  Inform patient of results and she has appt with allergist Monday.

## 2019-12-19 ENCOUNTER — OFFICE VISIT (OUTPATIENT)
Dept: FAMILY MEDICINE CLINIC | Facility: CLINIC | Age: 24
End: 2019-12-19

## 2019-12-19 VITALS
HEIGHT: 67 IN | DIASTOLIC BLOOD PRESSURE: 78 MMHG | TEMPERATURE: 97.2 F | OXYGEN SATURATION: 98 % | HEART RATE: 82 BPM | RESPIRATION RATE: 18 BRPM | WEIGHT: 248 LBS | BODY MASS INDEX: 38.92 KG/M2 | SYSTOLIC BLOOD PRESSURE: 124 MMHG

## 2019-12-19 DIAGNOSIS — E66.01 CLASS 3 SEVERE OBESITY DUE TO EXCESS CALORIES WITH SERIOUS COMORBIDITY AND BODY MASS INDEX (BMI) OF 45.0 TO 49.9 IN ADULT (HCC): Primary | Chronic | ICD-10-CM

## 2019-12-19 DIAGNOSIS — F41.9 ANXIETY: ICD-10-CM

## 2019-12-19 DIAGNOSIS — E55.9 VITAMIN D DEFICIENCY: ICD-10-CM
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
## 2019-12-19 DIAGNOSIS — F40.10 SOCIAL PHOBIA: ICD-10-CM

## 2019-12-19 PROCEDURE — 99214 OFFICE O/P EST MOD 30 MIN: CPT | Performed by: NURSE PRACTITIONER

## 2019-12-19 RX ORDER — PHENTERMINE HYDROCHLORIDE 37.5 MG/1
37.5 TABLET ORAL
Qty: 30 TABLET | Refills: 0 | Status: SHIPPED | OUTPATIENT
Start: 2019-12-19 | End: 2020-02-10 | Stop reason: SDUPTHER

## 2019-12-19 RX ORDER — DOXYCYCLINE HYCLATE 100 MG/1
100 TABLET, DELAYED RELEASE ORAL 2 TIMES DAILY
Qty: 28 TABLET | Refills: 0 | Status: SHIPPED | OUTPATIENT
Start: 2019-12-19 | End: 2020-02-10

## 2019-12-19 RX ORDER — CHOLECALCIFEROL (VITAMIN D3) 50 MCG
2000 TABLET ORAL DAILY
Qty: 90 TABLET | Refills: 3 | Status: SHIPPED | OUTPATIENT
Start: 2019-12-19 | End: 2020-04-27 | Stop reason: DRUGHIGH

## 2019-12-19 RX ORDER — CITALOPRAM 20 MG/1
20 TABLET ORAL DAILY
Qty: 30 TABLET | Refills: 5 | Status: SHIPPED | OUTPATIENT
Start: 2019-12-19 | End: 2020-01-30 | Stop reason: SDUPTHER

## 2019-12-19 NOTE — PROGRESS NOTES
Chief Complaint   Patient presents with   • Weight Loss     weightcheck      Subjective   Alka Albert is a 24 y.o. female who presents to the office for routine follow up of obesity and for supervised management of weight loss    The following portions of the patient's history were reviewed and updated as appropriate: allergies, current medications, past family history, past medical history, past social history, past surgical history and problem list.    History of Present Illness   Fasting Labs 11/25/19  CMP: WNL  CBC: WNL  Thyroid: WNL  Lipid: ,   A1c 5.62% on metformin  Vitamin D: 34.5-- advised supplement.     Obesity: Obesity/ Weight management:  Starting weight 278#   4/30/19 She has lost 1 pounds in March from 3/22/19 (278#)  to 4/30/19. She is to see Dr Rishi Mera, bariatric surgery, in [place] on 6/11/19.  5/24/19 Has lost 6 pounds from 4/30/19 visit to today  Total weight loss is now 7 pounds since 3/22/19 and BMI is 42.5   6/28/19 Weight stable at 271 same as on 5/24/19. BMI 42.4   7/29/19  Weight today is 268, down 3 pounds from last visit 6/28/19. BMI now 42.0  Total weight loss since 3/22/19 is 10 pounds   8/30/19 Weight today is 264, down 4lbs since last visit 7/29/19. BMI now 41.3 Total weight loss since 3/22/19 is 14 pounds.   12/19/19 weight today is 248 down 17 lbs from her last visit 11/25/19. BMI now 38.8 and total weight loss since 4/30/19 is 30 lbs.   Current weight loss plan:   Watching what she eats. She reports that her most recent weight loss of 17 lbs is due to the new diagnosis of Alpha Gal and now having to cut out a lot of foods from her diet.   She continues to watch her calorie intake and increase her water. She is working on incorporating exercise as well.   Denies constipation, HA, mood swings, or heart palpitations with medication.     Anxiety: Feels this has slightly improved with celexa, but not at goal. Would like to increase dose today. She specifically  "expresses that the majority of her anxiety is related to social situations     --request change to doxycycline tablets from powder or cap due to inability to tolerate powder and allergy to capsules gelatin. She has an appointment with allergy on Monday  Past Medical History:   Diagnosis Date   • IBS (irritable bowel syndrome)    • Infertility, female    • PCOS (polycystic ovarian syndrome)    • Seasonal allergies           Family History   Problem Relation Age of Onset   • Cancer Mother         Ovary Displasia    • No Known Problems Father    • Diabetes Sister         Review of Systems   Constitutional: Positive for appetite change. Negative for activity change, fatigue, fever and unexpected weight change.   HENT: Negative.    Eyes: Negative.    Respiratory: Negative.  Negative for cough, chest tightness and shortness of breath.    Cardiovascular: Negative.  Negative for chest pain.        HTN   Gastrointestinal: Negative.  Negative for nausea and vomiting.   Endocrine: Negative.    Genitourinary: Negative.  Negative for dysuria.   Musculoskeletal: Negative.    Skin: Negative.  Negative for color change, pallor, rash and wound.   Allergic/Immunologic: Negative.    Neurological: Negative.    Hematological: Negative.    Psychiatric/Behavioral: Negative for sleep disturbance and suicidal ideas. The patient is nervous/anxious.    All other systems reviewed and are negative.      Objective   Vitals:    12/19/19 1132   BP: 124/78   BP Location: Left arm   Patient Position: Sitting   Pulse: 82   Resp: 18   Temp: 97.2 °F (36.2 °C)   SpO2: 98%   Weight: 112 kg (248 lb)   Height: 170.2 cm (67.01\")   PainSc: 0-No pain     Physical Exam   Constitutional: She is oriented to person, place, and time. She appears well-developed and well-nourished.   obesity   HENT:   Head: Normocephalic and atraumatic.   Eyes: Pupils are equal, round, and reactive to light. Conjunctivae are normal.   Neck: Normal range of motion. Neck supple. "   Cardiovascular: Normal rate, regular rhythm, normal heart sounds and intact distal pulses. Exam reveals no gallop and no friction rub.   No murmur heard.  Pulmonary/Chest: Effort normal and breath sounds normal. No respiratory distress. She has no wheezes. She has no rales. She exhibits no tenderness.   Abdominal: Soft. Bowel sounds are normal.   Musculoskeletal: Normal range of motion. She exhibits no edema, tenderness or deformity.   Lymphadenopathy:     She has no cervical adenopathy.   Neurological: She is alert and oriented to person, place, and time.   Skin: Skin is warm and dry. Capillary refill takes 2 to 3 seconds. No erythema. No pallor.   Psychiatric: She has a normal mood and affect. Her behavior is normal. Judgment and thought content normal.   Nursing note and vitals reviewed.      Assessment/Plan   Alka was seen today for weight loss.    Diagnoses and all orders for this visit:    Class 3 severe obesity due to excess calories with serious comorbidity and body mass index (BMI) of 45.0 to 49.9 in adult (CMS/[facility])  -     phentermine (ADIPEX-P) 37.5 MG tablet; Take 1 tablet by mouth Every Morning Before Breakfast.    Tick bite, sequela  -     doxycycline (DORYX) 100 MG enteric coated tablet; Take 1 tablet by mouth 2 (Two) Times a Day.    Anxiety  -     citalopram (CeleXA) 20 MG tablet; Take 1 tablet by mouth Daily.    Social phobia  -     citalopram (CeleXA) 20 MG tablet; Take 1 tablet by mouth Daily.    Vitamin D deficiency  -     Cholecalciferol (VITAMIN D) 50 MCG (2000 UT) tablet; Take 2,000 Units by mouth Daily.           PHQ-2/PHQ-9 Depression Screening 6/28/2019   Little interest or pleasure in doing things 0   Feeling down, depressed, or hopeless 0   Total Score 0   Patient understands the risks associated with this controlled medication, including tolerance and addiction.  Patient also agrees to only obtain this medication from me, and not from a another provider, unless that provider is  covering for me in my absence.  Patient also agrees to be compliant in dosing, and not self adjust the dose of medication.  A signed controlled substance agreement is on file, and the patient has received a controlled substance education sheet at this a previous visit.  The patient has also signed a consent for treatment with a controlled substance as per [facility] policy. RA was obtained.      KRYSTA Vizcaino         Return in about 1 month (around 1/19/2020).    There are no Patient Instructions on file for this visit.

## 2020-01-30 DIAGNOSIS — F41.9 ANXIETY: ICD-10-CM

## 2020-01-30 DIAGNOSIS — F40.10 SOCIAL PHOBIA: ICD-10-CM

## 2020-01-30 DIAGNOSIS — I10 ESSENTIAL HYPERTENSION: ICD-10-CM

## 2020-01-30 RX ORDER — METOPROLOL SUCCINATE 25 MG/1
25 TABLET, EXTENDED RELEASE ORAL DAILY
Qty: 30 TABLET | Refills: 5 | Status: SHIPPED | OUTPATIENT
Start: 2020-01-30 | End: 2020-06-09 | Stop reason: SDUPTHER

## 2020-01-30 RX ORDER — CITALOPRAM 20 MG/1
20 TABLET ORAL DAILY
Qty: 30 TABLET | Refills: 5 | Status: SHIPPED | OUTPATIENT
Start: 2020-01-30 | End: 2020-02-10 | Stop reason: HOSPADM

## 2020-01-30 RX ORDER — AMLODIPINE BESYLATE 10 MG/1
10 TABLET ORAL DAILY
Qty: 30 TABLET | Refills: 5 | Status: SHIPPED | OUTPATIENT
Start: 2020-01-30 | End: 2020-06-09 | Stop reason: SDUPTHER

## 2020-02-10 ENCOUNTER — OFFICE VISIT (OUTPATIENT)
Dept: FAMILY MEDICINE CLINIC | Facility: CLINIC | Age: 25
End: 2020-02-10

## 2020-02-10 VITALS
BODY MASS INDEX: 38.3 KG/M2 | RESPIRATION RATE: 18 BRPM | TEMPERATURE: 98 F | HEIGHT: 67 IN | WEIGHT: 244 LBS | HEART RATE: 113 BPM | OXYGEN SATURATION: 99 %

## 2020-02-10 DIAGNOSIS — R73.9 HYPERGLYCEMIA: ICD-10-CM

## 2020-02-10 DIAGNOSIS — E28.2 PCOS (POLYCYSTIC OVARIAN SYNDROME): ICD-10-CM

## 2020-02-10 DIAGNOSIS — E16.1 HYPERINSULINEMIA: ICD-10-CM

## 2020-02-10 DIAGNOSIS — E55.9 VITAMIN D DEFICIENCY: Primary | ICD-10-CM

## 2020-02-10 DIAGNOSIS — E78.2 MIXED HYPERLIPIDEMIA: ICD-10-CM

## 2020-02-10 DIAGNOSIS — Z91.018 ALLERGY TO ALPHA-GAL: ICD-10-CM

## 2020-02-10 DIAGNOSIS — E66.01 CLASS 3 SEVERE OBESITY DUE TO EXCESS CALORIES WITH SERIOUS COMORBIDITY AND BODY MASS INDEX (BMI) OF 45.0 TO 49.9 IN ADULT (HCC): Chronic | ICD-10-CM

## 2020-02-10 DIAGNOSIS — E53.8 B12 DEFICIENCY: ICD-10-CM

## 2020-02-10 DIAGNOSIS — Z91.018 MULTIPLE FOOD ALLERGIES: ICD-10-CM

## 2020-02-10 DIAGNOSIS — A77.0 ROCKY MOUNTAIN SPOTTED FEVER: ICD-10-CM

## 2020-02-10 PROCEDURE — 99214 OFFICE O/P EST MOD 30 MIN: CPT | Performed by: NURSE PRACTITIONER

## 2020-02-10 RX ORDER — PHENTERMINE HYDROCHLORIDE 37.5 MG/1
37.5 TABLET ORAL
Qty: 30 TABLET | Refills: 0 | Status: SHIPPED | OUTPATIENT
Start: 2020-02-10 | End: 2020-05-11 | Stop reason: SDUPTHER

## 2020-02-10 RX ORDER — MONTELUKAST SODIUM 10 MG/1
10 TABLET ORAL NIGHTLY
Qty: 30 TABLET | Refills: 5 | Status: SHIPPED | OUTPATIENT
Start: 2020-02-10 | End: 2020-07-23

## 2020-02-10 RX ORDER — LORATADINE 10 MG/1
10 TABLET ORAL DAILY
Qty: 90 TABLET | Refills: 3
Start: 2020-02-10 | End: 2020-07-23

## 2020-02-10 NOTE — PROGRESS NOTES
Chief Complaint   Patient presents with   • Weight Check     med refill     Subjective   Alka Albert is a 24 y.o. female who presents to the office for weight check, medical weight loss with phentermine    The following portions of the patient's history were reviewed and updated as appropriate: allergies, current medications, past family history, past medical history, past social history, past surgical history and problem list.    History of Present Illness   Fasting Labs 11/25/19  CMP: WNL  CBC: WNL  Thyroid: WNL  Lipid: ,   A1c 5.62% on metformin  Vitamin D: 34.5-- advised OTC supplement.      Obesity: Obesity/ Weight management:  Starting weight 278#   4/30/19 She has lost 1 pounds in March from 3/22/19 (278#)  to 4/30/19. She was to see Dr Rishi Mera, bariatric surgery, in [place] on 6/11/19.   12/19/19 weight today is 248 down 17 lbs from her last visit 11/25/19. BMI now 38.8 and total weight loss since 4/30/19 is 30 lbs.   December was month 1/3 in cycle for phentermine she is not using it every day, but has lost an additional 4 pounds since last seen in December.  Not seen in January, and phentermine not refilled in January.  Today weight is 244# down 4 pounds from 12/19/19 visit.  She wants to begin taking the phentermine daily and speed her weight loss starting this month.    Current weight loss plan:   Watching what she eats. She reports that her most recent weight loss of 17 lbs is due to the new diagnosis of Alpha Gal and now having to cut out a lot of foods from her diet.   She continues to watch her calorie intake and increase her water. She is working on incorporating exercise as well.   Denies constipation, HA, mood swings, or heart palpitations with medication.      Anxiety: reports feels anxiety is better now and has stopped her celexa, no longer needing it.    Pop mountain spotted fever infection: due for repeat labs after treated in November  Alpha gal allergy: due for repeat  labs, has been abstaining from beef and pork, never did eat lamb. Reported that last Sunday she had an allergic reaction, had eaten tacos with sourcream and  turkey meat but afterward very quickly felt her tongue was swelling and difficulty swallowing, did not seek emergency medical treatment, improved with Zantac and claritin, then the next day it occurred again at work on Monday, and Indigo MILLER saw her and ordered depomedrol and solumedrol injections with rapid improvement in symptoms.   She takes a PRN claritin for allergies, doesn't want to take a daily antihistamine for the alpha gal allergy. Due to recent anaphylactic reactions to unknown triggers, advised to keep epipen on hand at all times, and begin daily OTC antihistamine as well as daily montelukast. She agrees.  B12: treated with injections    HPI, ROS  and PE from most recent  Visit carried forward and updated as appropriate for current situation.  Past Medical History:   Diagnosis Date   • IBS (irritable bowel syndrome)    • Infertility, female    • PCOS (polycystic ovarian syndrome)    • Seasonal allergies           Family History   Problem Relation Age of Onset   • Cancer Mother         Ovary Displasia    • No Known Problems Father    • Diabetes Sister         Review of Systems   Constitutional: Negative.  Negative for activity change, fatigue, fever and unexpected weight change.        Overweight   HENT: Negative.    Eyes: Negative.    Respiratory: Negative.  Negative for cough, chest tightness and shortness of breath.    Cardiovascular: Negative.  Negative for chest pain.        HTN   Gastrointestinal: Negative.  Negative for nausea and vomiting.   Endocrine: Negative.    Genitourinary: Negative.  Negative for dysuria.   Musculoskeletal: Negative.    Skin: Negative.  Negative for color change, pallor, rash and wound.   Allergic/Immunologic: Positive for food allergies.   Neurological: Negative.    Hematological: Negative.   "  Psychiatric/Behavioral: Negative.  Negative for sleep disturbance and suicidal ideas.   All other systems reviewed and are negative.      Objective   Vitals:    02/10/20 1148   Pulse: 113   Resp: 18   Temp: 98 °F (36.7 °C)   SpO2: 99%   Weight: 111 kg (244 lb)   Height: 170.2 cm (67.01\")   PainSc: 0-No pain     Physical Exam   Constitutional: She is oriented to person, place, and time. She appears well-developed and well-nourished.   obesity   HENT:   Head: Normocephalic and atraumatic.   Eyes: Pupils are equal, round, and reactive to light. Conjunctivae are normal.   Neck: Normal range of motion. Neck supple.   Cardiovascular: Normal rate, regular rhythm, normal heart sounds and intact distal pulses. Exam reveals no gallop and no friction rub.   No murmur heard.  Pulmonary/Chest: Effort normal and breath sounds normal. No respiratory distress. She has no wheezes. She has no rales. She exhibits no tenderness.   Abdominal: Soft. Bowel sounds are normal.   Musculoskeletal: Normal range of motion. She exhibits no edema, tenderness or deformity.   Lymphadenopathy:     She has no cervical adenopathy.   Neurological: She is alert and oriented to person, place, and time.   Skin: Skin is warm and dry. Capillary refill takes 2 to 3 seconds. No erythema. No pallor.   Psychiatric: She has a normal mood and affect. Her behavior is normal. Judgment and thought content normal.   Nursing note and vitals reviewed.      Assessment/Plan   Alka was seen today for weight check.    Diagnoses and all orders for this visit:    Vitamin D deficiency    Class 3 severe obesity due to excess calories with serious comorbidity and body mass index (BMI) of 45.0 to 49.9 in adult (CMS/[facility])  -     phentermine (ADIPEX-P) 37.5 MG tablet; Take 1 tablet by mouth Every Morning Before Breakfast.    B12 deficiency  -     Vitamin B12; Future    Allergy to alpha-gal  -     Alpha - Gal Panel; Future    PCOS (polycystic ovarian syndrome)  -     Hemoglobin " A1c  -     Insulin, Free & Total, Serum    Hyperglycemia  -     Hemoglobin A1c  -     Insulin, Free & Total, Serum    Hyperinsulinemia  -     Hemoglobin A1c  -     Insulin, Free & Total, Serum    Mixed hyperlipidemia  -     Lipid Panel    Pop Mountain spotted fever  -     [place] SF (IgG / M); Future    Multiple food allergies  -     montelukast (SINGULAIR) 10 MG tablet; Take 1 tablet by mouth Every Night.  -     loratadine (CLARITIN) 10 MG tablet; Take 1 tablet by mouth Daily.           PHQ-2/PHQ-9 Depression Screening 6/28/2019   Little interest or pleasure in doing things 0   Feeling down, depressed, or hopeless 0   Total Score 0       KRYSTA Vizcaino         Return in about 4 weeks (around 3/9/2020).    There are no Patient Instructions on file for this visit.

## 2020-03-26 RX ORDER — CIPROFLOXACIN 500 MG/1
500 TABLET, FILM COATED ORAL 2 TIMES DAILY
Qty: 14 TABLET | Refills: 0 | Status: SHIPPED | OUTPATIENT
Start: 2020-03-26 | End: 2020-03-26 | Stop reason: SDUPTHER

## 2020-03-26 RX ORDER — FLUCONAZOLE 150 MG/1
TABLET ORAL
Qty: 3 TABLET | Refills: 0 | Status: SHIPPED | OUTPATIENT
Start: 2020-03-26 | End: 2020-07-02

## 2020-03-26 RX ORDER — CIPROFLOXACIN 500 MG/1
500 TABLET, FILM COATED ORAL 2 TIMES DAILY
Qty: 14 TABLET | Refills: 0 | Status: SHIPPED | OUTPATIENT
Start: 2020-03-26 | End: 2020-06-30

## 2020-03-26 RX ORDER — FLUCONAZOLE 150 MG/1
TABLET ORAL
Qty: 3 TABLET | Refills: 0 | Status: SHIPPED | OUTPATIENT
Start: 2020-03-26 | End: 2020-03-26 | Stop reason: SDUPTHER

## 2020-04-27 ENCOUNTER — TELEMEDICINE (OUTPATIENT)
Dept: FAMILY MEDICINE CLINIC | Facility: CLINIC | Age: 25
End: 2020-04-27

## 2020-04-27 DIAGNOSIS — Z91.018 ALLERGY TO ALPHA-GAL: ICD-10-CM

## 2020-04-27 DIAGNOSIS — E66.01 CLASS 3 SEVERE OBESITY DUE TO EXCESS CALORIES WITH SERIOUS COMORBIDITY AND BODY MASS INDEX (BMI) OF 45.0 TO 49.9 IN ADULT (HCC): Chronic | ICD-10-CM

## 2020-04-27 DIAGNOSIS — R10.30 LOWER ABDOMINAL PAIN: ICD-10-CM

## 2020-04-27 DIAGNOSIS — E53.8 B12 DEFICIENCY: ICD-10-CM

## 2020-04-27 DIAGNOSIS — Z91.018 MULTIPLE FOOD ALLERGIES: ICD-10-CM

## 2020-04-27 DIAGNOSIS — F32.1 CURRENT MODERATE EPISODE OF MAJOR DEPRESSIVE DISORDER WITHOUT PRIOR EPISODE (HCC): ICD-10-CM

## 2020-04-27 DIAGNOSIS — E55.9 VITAMIN D DEFICIENCY: ICD-10-CM

## 2020-04-27 DIAGNOSIS — I10 ESSENTIAL HYPERTENSION: ICD-10-CM

## 2020-04-27 DIAGNOSIS — R11.0 NAUSEA: Primary | ICD-10-CM

## 2020-04-27 PROCEDURE — 99214 OFFICE O/P EST MOD 30 MIN: CPT | Performed by: NURSE PRACTITIONER

## 2020-04-27 RX ORDER — ONDANSETRON 8 MG/1
8 TABLET, ORALLY DISINTEGRATING ORAL EVERY 8 HOURS PRN
Qty: 90 TABLET | Refills: 1 | Status: SHIPPED | OUTPATIENT
Start: 2020-04-27 | End: 2020-05-18 | Stop reason: SDUPTHER

## 2020-04-27 RX ORDER — FLUOXETINE HYDROCHLORIDE 40 MG/1
40 CAPSULE ORAL DAILY
Qty: 30 CAPSULE | Refills: 5 | Status: SHIPPED | OUTPATIENT
Start: 2020-04-27 | End: 2020-07-02

## 2020-04-27 NOTE — PROGRESS NOTES
Chief Complaint   Patient presents with   • Allergies   • Abdominal Pain     Subjective   Alka Albert is a 24 y.o. female who presents to the office by video visit due to pandemic for worsening abd pain, nausea r/t alphagal and food allergies, and depression.    The following portions of the patient's history were reviewed and updated as appropriate: allergies, current medications, past family history, past medical history, past social history, past surgical history and problem list.    History of Present Illness   You have chosen to receive care through a telehealth visit.  Do you consent to use a video/audio connection for your medical care today? Yes  This was an audio and video enabled telemedicine encounter.    Reports several weeks of increasing abd cramping, lower abd pain and associated nausea. Similar to previously experienced before altering diet to avoid allergy triggers.  Desires a GI consult, declines nutrition consult at this time.  Reports compliance with trigger food avoidance, beef, dairy and multiple others.    Depression: notes sudden onset depression with multiple work and home life factors involved. Desires medication. Reports treatment failure in the past with Lexapro at 10mg and 20mg dosing. Agreeable to try Prozac and follow up in 2 weeks.   Denies SI/ HI.    Obesity/ supervised medical weight loss with phentermine.  Unable to tolerate a full dose daily, alternates days with phentermine.  Body mass index is 40.77 kg/m².   Has lost a total of 33 # since last March 2019.  Has gained a few pounds from last visit, due to a quarantine and lack of phentermine.   This month represents month 1/3 of phentermine cycle.          No other complaints today.    Past Medical History:   Diagnosis Date   • IBS (irritable bowel syndrome)    • Infertility, female    • PCOS (polycystic ovarian syndrome)    • Seasonal allergies           Family History   Problem Relation Age of Onset   • Cancer Mother          "Ovary Displasia    • No Known Problems Father    • Diabetes Sister         Review of Systems   Constitutional: Negative.  Negative for fever and unexpected weight change.   HENT: Negative.    Eyes: Negative.    Respiratory: Negative.  Negative for cough, chest tightness and shortness of breath.    Cardiovascular: Negative.  Negative for chest pain.   Gastrointestinal: Positive for abdominal pain and nausea.   Endocrine: Negative.    Genitourinary: Negative.  Negative for dysuria.   Musculoskeletal: Negative.    Skin: Negative.  Negative for color change, pallor, rash and wound.   Allergic/Immunologic: Negative.    Neurological: Negative.    Hematological: Negative.    Psychiatric/Behavioral: Positive for dysphoric mood. Negative for decreased concentration, hallucinations, self-injury, sleep disturbance and suicidal ideas. The patient is not nervous/anxious and is not hyperactive.        Objective   Vitals:    04/27/20 1422   Weight: 111 kg (245 lb)   Height: 165.1 cm (65\")     Physical Exam   Constitutional: She is oriented to person, place, and time. She appears well-developed and well-nourished. No distress.   HENT:   Head: Normocephalic and atraumatic.   Eyes: Pupils are equal, round, and reactive to light. Conjunctivae and EOM are normal. Right eye exhibits no discharge. Left eye exhibits no discharge. No scleral icterus.   Neck: Normal range of motion. Neck supple. No tracheal deviation present.   Pulmonary/Chest: Effort normal. No respiratory distress. She has no wheezes.   Musculoskeletal: Normal range of motion. She exhibits no edema, tenderness or deformity.   Neurological: She is alert and oriented to person, place, and time. No cranial nerve deficit.   Skin: Skin is dry. No rash noted. She is not diaphoretic. No erythema. No pallor.   Psychiatric: She has a normal mood and affect. Her behavior is normal. Thought content normal.       Assessment/Plan   Alka was seen today for allergies and abdominal " pain.    Diagnoses and all orders for this visit:    Nausea  -     Celiac Comprehensive Panel  -     Ambulatory Referral to Gastroenterology  -     ondansetron ODT (Zofran ODT) 8 MG disintegrating tablet; Dissolve 1 tablet under the tongue and swallow Every 8 (Eight) Hours As Needed for Nausea or Vomiting.  -     US Abdomen Complete; Future  -     Comprehensive Metabolic Panel  -     Amylase; Future    Essential hypertension  -     T4, Free  -     TSH  -     T3    B12 deficiency    Vitamin D deficiency  -     Vitamin D 25 Hydroxy  -     Cholecalciferol 1.25 MG (45987 UT) tablet; Take 1 tablet by mouth Every 7 (Seven) Days.    Lower abdominal pain  -     Celiac Comprehensive Panel  -     Ambulatory Referral to Gastroenterology  -     US Abdomen Complete; Future  -     CBC w AUTO Differential; Future  -     Comprehensive Metabolic Panel  -     Amylase; Future  -     Lipase; Future    Allergy to alpha-gal    Multiple food allergies  -     Celiac Comprehensive Panel  -     Ambulatory Referral to Gastroenterology    Current moderate episode of major depressive disorder without prior episode (CMS/HCC)  -     FLUoxetine (PROzac) 40 MG capsule; Take 1 capsule by mouth Daily.    Class 3 severe obesity due to excess calories with serious comorbidity and body mass index (BMI) of 45.0 to 49.9 in adult (CMS/HCC)  -     phentermine (ADIPEX-P) 37.5 MG tablet; Take 1 tablet by mouth Every Morning Before Breakfast.    previous orders for alphagal titers and vitamin D/ B12 levels and labs ordered today to be drawn in next 2 weeks at her convenience.  Referral to Dr Barber re: abd pain/ nausea.  US abd ordered.       PHQ-2/PHQ-9 Depression Screening 6/28/2019   Little interest or pleasure in doing things 0   Feeling down, depressed, or hopeless 0   Total Score 0   Patient understands the risks associated with this controlled medication, including tolerance and addiction.  Patient also agrees to only obtain this medication from me, and  not from a another provider, unless that provider is covering for me in my absence.  Patient also agrees to be compliant in dosing, and not self adjust the dose of medication.  A signed controlled substance agreement is on file, and the patient has received a controlled substance education sheet at this a previous visit.  The patient has also signed a consent for treatment with a controlled substance as per [facility] policy. RA was obtained.      KRYSTA Vizcaino         Return in about 1 month (around 5/27/2020).    There are no Patient Instructions on file for this visit.

## 2020-04-29 ENCOUNTER — DOCUMENTATION (OUTPATIENT)
Dept: FAMILY MEDICINE CLINIC | Facility: CLINIC | Age: 25
End: 2020-04-29

## 2020-04-30 VITALS — BODY MASS INDEX: 40.82 KG/M2 | WEIGHT: 245 LBS | HEIGHT: 65 IN

## 2020-05-11 RX ORDER — PHENTERMINE HYDROCHLORIDE 37.5 MG/1
37.5 TABLET ORAL
Qty: 30 TABLET | Refills: 0 | Status: SHIPPED | OUTPATIENT
Start: 2020-05-11 | End: 2020-06-19 | Stop reason: SDUPTHER

## 2020-05-13 DIAGNOSIS — M25.561 RIGHT KNEE PAIN, UNSPECIFIED CHRONICITY: Primary | ICD-10-CM

## 2020-05-15 ENCOUNTER — LAB (OUTPATIENT)
Dept: LAB | Facility: OTHER | Age: 25
End: 2020-05-15

## 2020-05-15 DIAGNOSIS — E53.8 B12 DEFICIENCY: ICD-10-CM

## 2020-05-15 DIAGNOSIS — Z91.018 ALLERGY TO ALPHA-GAL: ICD-10-CM

## 2020-05-15 DIAGNOSIS — R10.30 LOWER ABDOMINAL PAIN: ICD-10-CM

## 2020-05-15 DIAGNOSIS — A77.0 ROCKY MOUNTAIN SPOTTED FEVER: ICD-10-CM

## 2020-05-15 DIAGNOSIS — R11.0 NAUSEA: ICD-10-CM

## 2020-05-15 LAB
25(OH)D3 SERPL-MCNC: 30 NG/ML (ref 30–100)
ALBUMIN SERPL-MCNC: 3.9 G/DL (ref 3.5–5)
ALBUMIN/GLOB SERPL: 1.3 G/DL (ref 1.1–1.8)
ALP SERPL-CCNC: 51 U/L (ref 38–126)
ALT SERPL W P-5'-P-CCNC: 20 U/L
AMYLASE SERPL-CCNC: 54 U/L (ref 30–110)
ANION GAP SERPL CALCULATED.3IONS-SCNC: 6 MMOL/L (ref 5–15)
AST SERPL-CCNC: 23 U/L (ref 14–36)
BASOPHILS # BLD AUTO: 0.02 10*3/MM3 (ref 0–0.2)
BASOPHILS NFR BLD AUTO: 0.4 % (ref 0–1.5)
BILIRUB SERPL-MCNC: 0.4 MG/DL (ref 0.2–1.3)
BUN BLD-MCNC: 13 MG/DL (ref 7–23)
BUN/CREAT SERPL: 19.1 (ref 7–25)
CALCIUM SPEC-SCNC: 8.9 MG/DL (ref 8.4–10.2)
CHLORIDE SERPL-SCNC: 107 MMOL/L (ref 101–112)
CHOLEST SERPL-MCNC: 165 MG/DL (ref 150–200)
CO2 SERPL-SCNC: 26 MMOL/L (ref 22–30)
CREAT BLD-MCNC: 0.68 MG/DL (ref 0.52–1.04)
DEPRECATED RDW RBC AUTO: 41.6 FL (ref 37–54)
EOSINOPHIL # BLD AUTO: 0.14 10*3/MM3 (ref 0–0.4)
EOSINOPHIL NFR BLD AUTO: 2.6 % (ref 0.3–6.2)
ERYTHROCYTE [DISTWIDTH] IN BLOOD BY AUTOMATED COUNT: 12.6 % (ref 12.3–15.4)
GFR SERPL CREATININE-BSD FRML MDRD: 106 ML/MIN/1.73 (ref 71–165)
GLOBULIN UR ELPH-MCNC: 2.9 GM/DL (ref 2.3–3.5)
GLUCOSE BLD-MCNC: 94 MG/DL (ref 70–99)
HBA1C MFR BLD: 5.4 % (ref 4.8–5.6)
HCT VFR BLD AUTO: 36.8 % (ref 34–46.6)
HDLC SERPL-MCNC: 46 MG/DL (ref 40–59)
HGB BLD-MCNC: 12.3 G/DL (ref 12–15.9)
LDLC SERPL CALC-MCNC: 102 MG/DL
LDLC/HDLC SERPL: 2.22 {RATIO} (ref 0–3.22)
LIPASE SERPL-CCNC: 17 U/L (ref 13–60)
LYMPHOCYTES # BLD AUTO: 1.23 10*3/MM3 (ref 0.7–3.1)
LYMPHOCYTES NFR BLD AUTO: 22.8 % (ref 19.6–45.3)
MCH RBC QN AUTO: 31.1 PG (ref 26.6–33)
MCHC RBC AUTO-ENTMCNC: 33.4 G/DL (ref 31.5–35.7)
MCV RBC AUTO: 92.9 FL (ref 79–97)
MONOCYTES # BLD AUTO: 0.65 10*3/MM3 (ref 0.1–0.9)
MONOCYTES NFR BLD AUTO: 12.1 % (ref 5–12)
NEUTROPHILS # BLD AUTO: 3.35 10*3/MM3 (ref 1.7–7)
NEUTROPHILS NFR BLD AUTO: 62.1 % (ref 42.7–76)
PLATELET # BLD AUTO: 274 10*3/MM3 (ref 140–450)
PMV BLD AUTO: 10.7 FL (ref 6–12)
POTASSIUM BLD-SCNC: 4.1 MMOL/L (ref 3.4–5)
PROT SERPL-MCNC: 6.8 G/DL (ref 6.3–8.6)
RBC # BLD AUTO: 3.96 10*6/MM3 (ref 3.77–5.28)
SODIUM BLD-SCNC: 139 MMOL/L (ref 137–145)
T3 SERPL-MCNC: 132 NG/DL (ref 80–200)
T4 FREE SERPL-MCNC: 1.07 NG/DL (ref 0.93–1.7)
TRIGL SERPL-MCNC: 85 MG/DL
TSH SERPL DL<=0.05 MIU/L-ACNC: 1.08 UIU/ML (ref 0.27–4.2)
VIT B12 BLD-MCNC: 275 PG/ML (ref 211–946)
VLDLC SERPL-MCNC: 17 MG/DL
WBC NRBC COR # BLD: 5.39 10*3/MM3 (ref 3.4–10.8)

## 2020-05-15 PROCEDURE — 84443 ASSAY THYROID STIM HORMONE: CPT | Performed by: NURSE PRACTITIONER

## 2020-05-15 PROCEDURE — 80061 LIPID PANEL: CPT | Performed by: NURSE PRACTITIONER

## 2020-05-15 PROCEDURE — 83527 ASSAY OF INSULIN: CPT | Performed by: NURSE PRACTITIONER

## 2020-05-15 PROCEDURE — 83516 IMMUNOASSAY NONANTIBODY: CPT | Performed by: NURSE PRACTITIONER

## 2020-05-15 PROCEDURE — 83690 ASSAY OF LIPASE: CPT | Performed by: NURSE PRACTITIONER

## 2020-05-15 PROCEDURE — 84480 ASSAY TRIIODOTHYRONINE (T3): CPT | Performed by: NURSE PRACTITIONER

## 2020-05-15 PROCEDURE — 86008 ALLG SPEC IGE RECOMB EA: CPT | Performed by: NURSE PRACTITIONER

## 2020-05-15 PROCEDURE — 82607 VITAMIN B-12: CPT | Performed by: NURSE PRACTITIONER

## 2020-05-15 PROCEDURE — 85025 COMPLETE CBC W/AUTO DIFF WBC: CPT | Performed by: NURSE PRACTITIONER

## 2020-05-15 PROCEDURE — 86003 ALLG SPEC IGE CRUDE XTRC EA: CPT | Performed by: NURSE PRACTITIONER

## 2020-05-15 PROCEDURE — 82306 VITAMIN D 25 HYDROXY: CPT | Performed by: NURSE PRACTITIONER

## 2020-05-15 PROCEDURE — 83525 ASSAY OF INSULIN: CPT | Performed by: NURSE PRACTITIONER

## 2020-05-15 PROCEDURE — 83036 HEMOGLOBIN GLYCOSYLATED A1C: CPT | Performed by: NURSE PRACTITIONER

## 2020-05-15 PROCEDURE — 82784 ASSAY IGA/IGD/IGG/IGM EACH: CPT | Performed by: NURSE PRACTITIONER

## 2020-05-15 PROCEDURE — 36415 COLL VENOUS BLD VENIPUNCTURE: CPT | Performed by: NURSE PRACTITIONER

## 2020-05-15 PROCEDURE — 86757 RICKETTSIA ANTIBODY: CPT | Performed by: NURSE PRACTITIONER

## 2020-05-15 PROCEDURE — 82150 ASSAY OF AMYLASE: CPT | Performed by: NURSE PRACTITIONER

## 2020-05-15 PROCEDURE — 84439 ASSAY OF FREE THYROXINE: CPT | Performed by: NURSE PRACTITIONER

## 2020-05-15 PROCEDURE — 80053 COMPREHEN METABOLIC PANEL: CPT | Performed by: NURSE PRACTITIONER

## 2020-05-15 PROCEDURE — 86255 FLUORESCENT ANTIBODY SCREEN: CPT | Performed by: NURSE PRACTITIONER

## 2020-05-18 DIAGNOSIS — R11.0 NAUSEA: ICD-10-CM

## 2020-05-18 LAB
ENDOMYSIUM IGA SER QL: NEGATIVE
GLIADIN PEPTIDE IGA SER-ACNC: 7 UNITS (ref 0–19)
GLIADIN PEPTIDE IGG SER-ACNC: 4 UNITS (ref 0–19)
IGA SERPL-MCNC: 332 MG/DL (ref 87–352)
INSULIN FREE SERPL-ACNC: 13 UU/ML
INSULIN SERPL-ACNC: 15 UU/ML
TTG IGA SER-ACNC: <2 U/ML (ref 0–3)
TTG IGG SER-ACNC: 12 U/ML (ref 0–5)

## 2020-05-18 RX ORDER — ONDANSETRON 8 MG/1
8 TABLET, ORALLY DISINTEGRATING ORAL EVERY 8 HOURS PRN
Qty: 90 TABLET | Refills: 1 | Status: SHIPPED | OUTPATIENT
Start: 2020-05-18 | End: 2020-10-26 | Stop reason: SDUPTHER

## 2020-05-19 LAB
R RICKETTSI IGG SER QL IA: ABNORMAL
R RICKETTSI IGG SER QL IA: POSITIVE
R RICKETTSI IGM TITR SER: 0.74 INDEX (ref 0–0.89)

## 2020-05-21 LAB
ALPHA GAL IGE: 0.55 KU/L
BEEF IGE QN: 0.77 KU/L
LAMB IGE QN: 0.39 KU/L
Lab: 1
Lab: 1
Lab: 2
PORK IGE: 0.6 KU/L

## 2020-06-09 DIAGNOSIS — I10 ESSENTIAL HYPERTENSION: ICD-10-CM

## 2020-06-09 RX ORDER — METOPROLOL SUCCINATE 25 MG/1
25 TABLET, EXTENDED RELEASE ORAL DAILY
Qty: 30 TABLET | Refills: 5 | Status: SHIPPED | OUTPATIENT
Start: 2020-06-09 | End: 2020-07-02

## 2020-06-09 RX ORDER — AMLODIPINE BESYLATE 10 MG/1
10 TABLET ORAL DAILY
Qty: 30 TABLET | Refills: 5 | Status: SHIPPED | OUTPATIENT
Start: 2020-06-09 | End: 2020-07-02

## 2020-06-19 ENCOUNTER — TELEMEDICINE (OUTPATIENT)
Dept: FAMILY MEDICINE CLINIC | Facility: CLINIC | Age: 25
End: 2020-06-19

## 2020-06-19 VITALS — HEIGHT: 65 IN | WEIGHT: 245 LBS | BODY MASS INDEX: 40.82 KG/M2

## 2020-06-19 DIAGNOSIS — E66.01 CLASS 3 SEVERE OBESITY DUE TO EXCESS CALORIES WITH SERIOUS COMORBIDITY AND BODY MASS INDEX (BMI) OF 45.0 TO 49.9 IN ADULT (HCC): Chronic | ICD-10-CM

## 2020-06-19 PROCEDURE — 99214 OFFICE O/P EST MOD 30 MIN: CPT | Performed by: NURSE PRACTITIONER

## 2020-06-19 RX ORDER — PHENTERMINE HYDROCHLORIDE 37.5 MG/1
37.5 TABLET ORAL
Qty: 30 TABLET | Refills: 0 | Status: SHIPPED | OUTPATIENT
Start: 2020-06-19 | End: 2020-07-02

## 2020-06-19 NOTE — PATIENT INSTRUCTIONS
Increase intake of water to 6-8 glasses per day.  Cut out concentrated sugars and reduce simple carbs.  Count calories, measure servings  Use a smart phone meg to help with counting calories and tracking weight loss, healthy lifestyle modifications.     Demonstrate at least a 1-2 pound / month weight loss with a healthy BP to continue on phentermine    Be aware that my routine practice is to allow qualified persons take phentermine for 3 months consecutively, then a one month holiday off it to prove the presence of lifestyle changes which are sustained, and may repeat this cycle as long as qualified and appropriate for phentermine.    If BMI falls below 27, the cycle will not continue

## 2020-06-19 NOTE — PROGRESS NOTES
Chief Complaint   Patient presents with   • Weight Loss     Subjective   Alka Albert is a 25 y.o. female who presents to the office by video visit due to pandemic for weight loss.    The following portions of the patient's history were reviewed and updated as appropriate: allergies, current medications, past family history, past medical history, past social history, past surgical history and problem list.    History of Present Illness   You have chosen to receive care through a telehealth visit.  Do you consent to use a video/audio connection for your medical care today? Yes  This was an audio and video enabled telemedicine encounter.    Obesity/ supervised medical weight loss with phentermine.  Unable to tolerate a full dose daily, alternates days with phentermine.  Body mass index is 40.77 kg/m².    Has lost a total of 33 # since last March 2019.  Has maintained weight from last visit, due to a recent vacation with vacation food. Denies adverse effects of medication. Last refill sent on 4/27/20.  This month represents month 1/3 of phentermine cycle.          No other complaints today.        Past Medical History:   Diagnosis Date   • IBS (irritable bowel syndrome)    • Infertility, female    • PCOS (polycystic ovarian syndrome)    • Seasonal allergies           Family History   Problem Relation Age of Onset   • Cancer Mother         Ovary Displasia    • No Known Problems Father    • Diabetes Sister         Review of Systems   Constitutional: Negative.  Negative for activity change, appetite change, fatigue, fever and unexpected weight change.   HENT: Negative.    Eyes: Negative.    Respiratory: Negative.  Negative for cough, chest tightness and shortness of breath.    Cardiovascular: Negative.  Negative for chest pain.        HTN   Gastrointestinal: Negative.  Negative for nausea and vomiting.   Endocrine: Negative.    Genitourinary: Negative.  Negative for dysuria.   Musculoskeletal: Negative.    Skin:  "Negative.  Negative for color change, pallor, rash and wound.   Allergic/Immunologic: Negative.    Neurological: Negative.    Hematological: Negative.    Psychiatric/Behavioral: Negative for sleep disturbance and suicidal ideas. The patient is nervous/anxious.    All other systems reviewed and are negative.      Objective   Vitals:    06/19/20 1412   Weight: 111 kg (245 lb)   Height: 165.1 cm (65\")     Physical Exam   Constitutional: She is oriented to person, place, and time. She appears well-developed and well-nourished. No distress.   Central obesity   HENT:   Head: Normocephalic and atraumatic.   Eyes: Pupils are equal, round, and reactive to light. Conjunctivae and EOM are normal. Right eye exhibits no discharge. Left eye exhibits no discharge. No scleral icterus.   Neck: Normal range of motion. Neck supple. No tracheal deviation present.   Pulmonary/Chest: Effort normal. No respiratory distress. She has no wheezes.   Musculoskeletal: Normal range of motion. She exhibits no edema, tenderness or deformity.   Neurological: She is alert and oriented to person, place, and time. No cranial nerve deficit.   Skin: Skin is dry. No rash noted. She is not diaphoretic. No erythema. No pallor.   Psychiatric: She has a normal mood and affect. Her behavior is normal. Thought content normal.       Assessment/Plan   Alka was seen today for weight loss.    Diagnoses and all orders for this visit:    Class 3 severe obesity due to excess calories with serious comorbidity and body mass index (BMI) of 45.0 to 49.9 in adult (CMS/[facility])  -     phentermine (ADIPEX-P) 37.5 MG tablet; Take 1 tablet by mouth Every Morning Before Breakfast.         Stable weight, discussed healthy lifestyle changes, refill phentermine today.  PHQ-2/PHQ-9 Depression Screening 6/28/2019   Little interest or pleasure in doing things 0   Feeling down, depressed, or hopeless 0   Total Score 0       KRYSTA Vizcaino         Return in about 4 weeks (around " 7/17/2020).    Patient Instructions   Increase intake of water to 6-8 glasses per day.  Cut out concentrated sugars and reduce simple carbs.  Count calories, measure servings  Use a smart phone meg to help with counting calories and tracking weight loss, healthy lifestyle modifications.     Demonstrate at least a 1-2 pound / month weight loss with a healthy BP to continue on phentermine    Be aware that my routine practice is to allow qualified persons take phentermine for 3 months consecutively, then a one month holiday off it to prove the presence of lifestyle changes which are sustained, and may repeat this cycle as long as qualified and appropriate for phentermine.    If BMI falls below 27, the cycle will not continue

## 2020-06-30 DIAGNOSIS — H66.004 RECURRENT ACUTE SUPPURATIVE OTITIS MEDIA OF RIGHT EAR WITHOUT SPONTANEOUS RUPTURE OF TYMPANIC MEMBRANE: ICD-10-CM

## 2020-06-30 DIAGNOSIS — H60.501 ACUTE OTITIS EXTERNA OF RIGHT EAR, UNSPECIFIED TYPE: ICD-10-CM

## 2020-06-30 DIAGNOSIS — H60.501 ACUTE OTITIS EXTERNA OF RIGHT EAR, UNSPECIFIED TYPE: Primary | ICD-10-CM

## 2020-06-30 RX ORDER — CIPROFLOXACIN 500 MG/1
500 TABLET, FILM COATED ORAL 2 TIMES DAILY
Qty: 20 TABLET | Refills: 0 | Status: SHIPPED | OUTPATIENT
Start: 2020-06-30 | End: 2020-06-30 | Stop reason: SDUPTHER

## 2020-06-30 RX ORDER — OFLOXACIN 3 MG/ML
5 SOLUTION AURICULAR (OTIC) 2 TIMES DAILY
Qty: 10 ML | Refills: 0 | Status: SHIPPED | OUTPATIENT
Start: 2020-06-30 | End: 2020-06-30 | Stop reason: SDUPTHER

## 2020-06-30 RX ORDER — OFLOXACIN 3 MG/ML
5 SOLUTION AURICULAR (OTIC) 2 TIMES DAILY
Qty: 10 ML | Refills: 0 | Status: SHIPPED | OUTPATIENT
Start: 2020-06-30 | End: 2020-07-02

## 2020-06-30 RX ORDER — CIPROFLOXACIN 500 MG/1
500 TABLET, FILM COATED ORAL 2 TIMES DAILY
Qty: 20 TABLET | Refills: 0 | Status: SHIPPED | OUTPATIENT
Start: 2020-06-30 | End: 2020-07-02

## 2020-07-02 ENCOUNTER — LAB (OUTPATIENT)
Dept: LAB | Facility: OTHER | Age: 25
End: 2020-07-02

## 2020-07-02 DIAGNOSIS — Z3A.01 LESS THAN 8 WEEKS GESTATION OF PREGNANCY: Primary | ICD-10-CM

## 2020-07-02 DIAGNOSIS — Z32.01 POSITIVE URINE PREGNANCY TEST: ICD-10-CM

## 2020-07-02 DIAGNOSIS — Z32.01 POSITIVE URINE PREGNANCY TEST: Primary | ICD-10-CM

## 2020-07-02 LAB
ALBUMIN SERPL-MCNC: 4.2 G/DL (ref 3.5–5)
ALBUMIN/GLOB SERPL: 1.3 G/DL (ref 1.1–1.8)
ALP SERPL-CCNC: 64 U/L (ref 38–126)
ALT SERPL W P-5'-P-CCNC: 19 U/L
ANION GAP SERPL CALCULATED.3IONS-SCNC: 8 MMOL/L (ref 5–15)
AST SERPL-CCNC: 22 U/L (ref 14–36)
BASOPHILS # BLD AUTO: 0.03 10*3/MM3 (ref 0–0.2)
BASOPHILS NFR BLD AUTO: 0.4 % (ref 0–1.5)
BILIRUB SERPL-MCNC: 0.3 MG/DL (ref 0.2–1.3)
BUN SERPL-MCNC: 10 MG/DL (ref 7–23)
BUN/CREAT SERPL: 13.9 (ref 7–25)
CALCIUM SPEC-SCNC: 9.9 MG/DL (ref 8.4–10.2)
CHLORIDE SERPL-SCNC: 103 MMOL/L (ref 101–112)
CO2 SERPL-SCNC: 26 MMOL/L (ref 22–30)
CREAT SERPL-MCNC: 0.72 MG/DL (ref 0.52–1.04)
DEPRECATED RDW RBC AUTO: 40.5 FL (ref 37–54)
EOSINOPHIL # BLD AUTO: 0.13 10*3/MM3 (ref 0–0.4)
EOSINOPHIL NFR BLD AUTO: 1.7 % (ref 0.3–6.2)
ERYTHROCYTE [DISTWIDTH] IN BLOOD BY AUTOMATED COUNT: 12.4 % (ref 12.3–15.4)
GFR SERPL CREATININE-BSD FRML MDRD: 99 ML/MIN/1.73 (ref 71–165)
GLOBULIN UR ELPH-MCNC: 3.3 GM/DL (ref 2.3–3.5)
GLUCOSE SERPL-MCNC: 101 MG/DL (ref 70–99)
HCG SERPL QL: POSITIVE
HCT VFR BLD AUTO: 38.9 % (ref 34–46.6)
HGB BLD-MCNC: 13 G/DL (ref 12–15.9)
LYMPHOCYTES # BLD AUTO: 1.78 10*3/MM3 (ref 0.7–3.1)
LYMPHOCYTES NFR BLD AUTO: 23.7 % (ref 19.6–45.3)
MCH RBC QN AUTO: 30.5 PG (ref 26.6–33)
MCHC RBC AUTO-ENTMCNC: 33.4 G/DL (ref 31.5–35.7)
MCV RBC AUTO: 91.3 FL (ref 79–97)
MONOCYTES # BLD AUTO: 0.78 10*3/MM3 (ref 0.1–0.9)
MONOCYTES NFR BLD AUTO: 10.4 % (ref 5–12)
NEUTROPHILS NFR BLD AUTO: 4.78 10*3/MM3 (ref 1.7–7)
NEUTROPHILS NFR BLD AUTO: 63.8 % (ref 42.7–76)
PLATELET # BLD AUTO: 327 10*3/MM3 (ref 140–450)
PMV BLD AUTO: 10.3 FL (ref 6–12)
POTASSIUM SERPL-SCNC: 4.4 MMOL/L (ref 3.4–5)
PROT SERPL-MCNC: 7.5 G/DL (ref 6.3–8.6)
RBC # BLD AUTO: 4.26 10*6/MM3 (ref 3.77–5.28)
SODIUM SERPL-SCNC: 137 MMOL/L (ref 137–145)
WBC # BLD AUTO: 7.5 10*3/MM3 (ref 3.4–10.8)

## 2020-07-02 PROCEDURE — 80053 COMPREHEN METABOLIC PANEL: CPT | Performed by: PHYSICIAN ASSISTANT

## 2020-07-02 PROCEDURE — 84703 CHORIONIC GONADOTROPIN ASSAY: CPT | Performed by: PHYSICIAN ASSISTANT

## 2020-07-02 PROCEDURE — 85025 COMPLETE CBC W/AUTO DIFF WBC: CPT | Performed by: PHYSICIAN ASSISTANT

## 2020-07-02 PROCEDURE — 84702 CHORIONIC GONADOTROPIN TEST: CPT | Performed by: NURSE PRACTITIONER

## 2020-07-03 LAB — HCG INTACT+B SERPL-ACNC: 555.5 MIU/ML

## 2020-07-06 ENCOUNTER — LAB (OUTPATIENT)
Dept: LAB | Facility: OTHER | Age: 25
End: 2020-07-06

## 2020-07-06 DIAGNOSIS — Z32.01 POSITIVE URINE PREGNANCY TEST: ICD-10-CM

## 2020-07-06 DIAGNOSIS — Z3A.01 LESS THAN 8 WEEKS GESTATION OF PREGNANCY: Primary | ICD-10-CM

## 2020-07-06 DIAGNOSIS — Z3A.01 LESS THAN 8 WEEKS GESTATION OF PREGNANCY: ICD-10-CM

## 2020-07-06 DIAGNOSIS — H60.501 ACUTE OTITIS EXTERNA OF RIGHT EAR, UNSPECIFIED TYPE: Primary | ICD-10-CM

## 2020-07-06 LAB — HCG INTACT+B SERPL-ACNC: 2726 MIU/ML

## 2020-07-06 PROCEDURE — 84702 CHORIONIC GONADOTROPIN TEST: CPT | Performed by: PHYSICIAN ASSISTANT

## 2020-07-06 PROCEDURE — 36415 COLL VENOUS BLD VENIPUNCTURE: CPT | Performed by: PHYSICIAN ASSISTANT

## 2020-07-06 RX ORDER — TOBRAMYCIN AND DEXAMETHASONE 3; 1 MG/ML; MG/ML
SUSPENSION/ DROPS OPHTHALMIC
Qty: 10 ML | Refills: 0 | Status: SHIPPED | OUTPATIENT
Start: 2020-07-06 | End: 2020-11-09

## 2020-07-06 RX ORDER — LABETALOL 100 MG/1
100 TABLET, FILM COATED ORAL 2 TIMES DAILY
Qty: 60 TABLET | Refills: 1 | Status: SHIPPED | OUTPATIENT
Start: 2020-07-06 | End: 2020-07-23 | Stop reason: SINTOL

## 2020-07-06 NOTE — PROGRESS NOTES
Pt is pregnant. Approximately 4.5 weeks. LMP is approx 6/4 but it was light and only 2 days. Pt has irregular periods and struggled with infertility in the past. She was on metoprolol and amlodipine for HTN. She has stopped both of these. Will initiate labetalol 100mg BID. She is going to schedule with Dr. Galvez for prenatal care.

## 2020-07-23 ENCOUNTER — INITIAL PRENATAL (OUTPATIENT)
Dept: OBSTETRICS AND GYNECOLOGY | Facility: CLINIC | Age: 25
End: 2020-07-23

## 2020-07-23 VITALS — DIASTOLIC BLOOD PRESSURE: 76 MMHG | BODY MASS INDEX: 42.83 KG/M2 | WEIGHT: 257.4 LBS | SYSTOLIC BLOOD PRESSURE: 122 MMHG

## 2020-07-23 DIAGNOSIS — O10.919 CHRONIC HYPERTENSION AFFECTING PREGNANCY: ICD-10-CM

## 2020-07-23 DIAGNOSIS — Z3A.01 7 WEEKS GESTATION OF PREGNANCY: ICD-10-CM

## 2020-07-23 DIAGNOSIS — O99.211 OBESITY AFFECTING PREGNANCY IN FIRST TRIMESTER: ICD-10-CM

## 2020-07-23 DIAGNOSIS — O26.899 INSULIN RESISTANCE COMPLICATING PREGNANCY: ICD-10-CM

## 2020-07-23 DIAGNOSIS — Z32.01 POSITIVE PREGNANCY TEST: Primary | ICD-10-CM

## 2020-07-23 PROBLEM — J45.909 ASTHMA DURING PREGNANCY: Status: ACTIVE | Noted: 2020-07-23

## 2020-07-23 PROBLEM — O99.519 ASTHMA DURING PREGNANCY: Status: ACTIVE | Noted: 2020-07-23

## 2020-07-23 PROBLEM — Z01.818 PREOP EXAMINATION: Status: RESOLVED | Noted: 2019-08-28 | Resolved: 2020-07-23

## 2020-07-23 PROBLEM — E88.819 INSULIN RESISTANCE COMPLICATING PREGNANCY: Status: ACTIVE | Noted: 2020-07-23

## 2020-07-23 PROCEDURE — 99214 OFFICE O/P EST MOD 30 MIN: CPT | Performed by: OBSTETRICS & GYNECOLOGY

## 2020-07-23 NOTE — PROGRESS NOTES
[facility]  Obstetrics Visit    CHIEF COMPLAINT:  New prenatal visit    HISTORY OF PRESENT ILLNESS:  Alka Albert is a 25 y.o. y/o  at 7w1d by LMP (Patient's last menstrual period was 2020.).  She states that her periods were previously irregular but for the past 8 periods, they have been regular monthly.  She had some spotting on .  She had a beta-hCG drawn on  that was 555; repeat on  was 2,726.  This was a planned pregnancy.  Reports nausea without vomiting.  Reports breast tenderness.  She denies any vaginal bleeding.  She has started taking a prenatal vitamin.    She had a normal pap smear last year in 2019 with Dr. Hess.  She states that she previously used 8 cycles of Clomid 2 years ago but this is a spontaneous pregnancy.    She was on labetalol for her CHTN.  However, she had side effects and stopped that.  Since then, her BP have been stable.  She was previously on metoprolol and amlodipine.    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    REVIEW OF SYSTEMS  Review of Systems   Constitutional: Positive for fatigue.   HENT: Negative.    Eyes: Negative.    Respiratory: Negative.    Cardiovascular: Negative.    Gastrointestinal: Positive for nausea.   Endocrine: Negative.    Genitourinary: Negative for vaginal bleeding.   Musculoskeletal: Negative.    Skin: Negative.    Allergic/Immunologic: Negative.    Neurological: Negative.    Hematological: Negative.    Psychiatric/Behavioral: Negative.      PRENATAL RISK FACTORS   Problems (from 20 to present)     Problem Noted Resolved    Obesity affecting pregnancy in first trimester 2020 by Gail Galvez MD No    Overview Addendum 2020 12:59 PM by Gail Galvez MD     Class III obesity, PG BMI 40.6  Early glucola ordered  Baseline PreE labs ordered         Chronic hypertension affecting pregnancy 2020 by Gail Galvez  MD Josi No    Overview Addendum 2020  1:45 PM by Gail Galvez MD     Was previously on labetalol prior to pregnancy but d/c due to side effects; before that, was on metoprolol and amlodipine  Currently on no medications  Baseline preE labs ordered         Insulin resistance complicating pregnancy 2020 by Gail Galvez MD No    Overview Signed 2020 12:56 PM by Gail Galvez MD     PCOS  Early glucola ordered             OBSTETRIC HISTORY:  OB History    Para Term  AB Living   1 0 0 0 0 0   SAB TAB Ectopic Molar Multiple Live Births   0 0 0 0 0 0      # Outcome Date GA Lbr Roosevelt/2nd Weight Sex Delivery Anes PTL Lv   1 Current              GYN HISTORY:  Denies h/o sexually transmitted infections/pelvic inflammatory disease  Denies h/o abnormal pap smears  Last pap smear:  per patient normal (Dr. Hess)  Denies h/o gynecologic surgeries, including biopsies of the cervix    PAST MEDICAL HISTORY:  Past Medical History:   Diagnosis Date   • IBS (irritable bowel syndrome)    • Infertility, female    • PCOS (polycystic ovarian syndrome)    • Seasonal allergies      PAST SURGICAL HISTORY:  Past Surgical History:   Procedure Laterality Date   • NO PAST SURGERIES       FAMILY HISTORY:  Family History   Problem Relation Age of Onset   • Cancer Mother         Ovary Displasia    • No Known Problems Father    • Diabetes Sister      SOCIAL HISTORY:  Social History     Socioeconomic History   • Marital status:      Spouse name: Not on file   • Number of children: Not on file   • Years of education: Not on file   • Highest education level: Not on file   Tobacco Use   • Smoking status: Never Smoker   • Smokeless tobacco: Never Used   Substance and Sexual Activity   • Alcohol use: No   • Drug use: No   • Sexual activity: Yes     Partners: Male     Birth control/protection: None     GENETIC SCREENING:  Age >36 yo as of SAY: no  Thalassemia:  no  NTD: no  CHD: no  Down Syndrome/MR/Fragile X/Autism: no  Ashkenazi Jainism with Karthik-Sachs, [place], familial dysautonomia: no  Sickle cell disease or trait: no  Hemophilia: no  Muscular dystrophy: no  Cystic fibrosis: no  Tabitha's chorea: no  Birth defects: no  Genetic/chromosomal disorders: no    INFECTION HISTORY:  TB exposure: no  HSV: no  Illness since LMP: no  Prior GBS infected child: N/A  STIs: no    ALLERGIES:  Allergies   Allergen Reactions   • Bactrim [Sulfamethoxazole-Trimethoprim] Hives   • Beef-Derived Products Hives     Alpha gal positive with food allergies multiple   • Cephalosporins Hives   • Rocephin [Ceftriaxone] Hives   • Milk-Related Compounds Hives and Angioedema     Alpha gal multiple food allergies, one episode of angioedema not sure which food caused 2020.       MEDICATIONS:  Prior to Admission medications    Medication Sig Start Date End Date Taking? Authorizing Provider   Cholecalciferol 1.25 MG (59237 UT) tablet Take 1 tablet by mouth Every 7 (Seven) Days. 4/27/20   Luz Elena Hanna APRN   EPINEPHrine (EPIPEN 2-LINDA) 0.3 MG/0.3ML solution auto-injector injection Inject one syringe into the skin x 1 immediately if severe allergic reaction occurs. 12/6/19   Luz Elena Hanna APRN   labetalol (NORMODYNE) 100 MG tablet Take 1 tablet by mouth 2 (Two) Times a Day. 7/6/20   Kristel Mackey APRN   loratadine (CLARITIN) 10 MG tablet Take 1 tablet by mouth Daily. 2/10/20   Luz Elena Hanna APRN   montelukast (SINGULAIR) 10 MG tablet Take 1 tablet by mouth Every Night. 2/10/20   Luz Elena Hanna APRN   ondansetron ODT (Zofran ODT) 8 MG disintegrating tablet Dissolve 1 tablet under the tongue and swallow Every 8 (Eight) Hours As Needed for Nausea or Vomiting. 5/18/20   Luz Elena Hanna APRN   tobramycin-dexamethasone (TobraDex) 0.3-0.1 % ophthalmic suspension Use bid in right ear 4 drops for 7 days 7/6/20   Indigo Saab PA-C     PHYSICAL EXAM:   /76   Wt  117 kg (257 lb 6.4 oz)   LMP 2020   BMI 42.83 kg/m²   General: Alert, healthy, no distress, well nourished and well developed.  Neurologic: Alert, oriented to person, place, and time.  Gait normal.  Cranial nerves II-XII grossly intact.  HEENT: Normocephalic, atraumatic.  Extraocular muscles intact, pupils equal and reactive x2.    Teeth: Normal hygiene.  Neck: Supple, no adenopathy, thyroid normal size, non-tender, without nodularity, trachea midline.  Lungs: Normal respiratory effort.  Clear to auscultation bilaterally.  No wheezes, rhonci, or rales.  Heart: Regular rate and rhythm.  No murmer, rub or gallop.  Abdomen: Soft, non-tender, non-distended,no masses, no hepatosplenomegaly, no hernia.  Skin: No rash, no lesions.  Extremities: No cyanosis, clubbing or edema.  Pelvic exam: Deferred    Bedside ultrasound performed by myself which shows the findings below:  Difficult tissue penetration, unable to visualize fetal pole    IMPRESSION:  Alka Albert is a 25 y.o.  at 7w1d for a new prenatal visit.    PLAN:  1.  IUP at 7w1d by LMP  - Options counseling performed and patient desires continuation of pregnancy to term   - Prenatal labs ordered  - Genetic testing, including cystic fibrosis, was discussed and patient undecided  - Continue prenatal vitamins  - Weight gain counseling performed.   - Pregravid BMI >30: Recommend 11-20 lb  - Return to clinic in 4 weeks for return prenatal visit  - Reviewed COVID-19 visitation policy  - Reviewed COVID-19 precautions     Diagnosis Plan   1. Positive pregnancy test  OB Panel With HIV    Chlamydia trachomatis, Neisseria gonorrhoeae, Trichomonas vaginalis, PCR - Urine, Urine, Clean Catch    Urine Culture - Urine, Urine, Random Void    US Ob Transvaginal   2. Obesity affecting pregnancy in first trimester  Glucose, Post 50 Gm Glucola   3. Chronic hypertension affecting pregnancy  Protein, Urine, 24 Hour - Urine, Clean Catch    Comprehensive Metabolic Panel     Protein / Creatinine Ratio, Urine - Urine, Clean Catch   4. Insulin resistance complicating pregnancy  Glucose, Post 50 Gm Glucola   5. 7 weeks gestation of pregnancy       Gail Galvez MD  7/23/2020  13:46

## 2020-07-24 ENCOUNTER — TELEPHONE (OUTPATIENT)
Dept: OBSTETRICS AND GYNECOLOGY | Facility: CLINIC | Age: 25
End: 2020-07-24

## 2020-07-24 NOTE — TELEPHONE ENCOUNTER
Patient returned call, let her know US looked great and due date would be 3/10/21 by her LMP.    Patient verbalized understanding

## 2020-07-24 NOTE — TELEPHONE ENCOUNTER
----- Message from Gail Galvez MD sent at 7/24/2020  8:11 AM CDT -----  Please call and let her know that the ultrasound looks great.  Since the 2 due dates are close, we will go with her due date by her LMP which is 3/10/2021.

## 2020-07-27 RX ORDER — AMOXICILLIN 500 MG/1
500 TABLET, FILM COATED ORAL 3 TIMES DAILY
Qty: 21 TABLET | Refills: 0 | Status: SHIPPED | OUTPATIENT
Start: 2020-07-27 | End: 2020-08-03

## 2020-08-18 RX ORDER — PNV NO.95/FERROUS FUM/FOLIC AC 28MG-0.8MG
1 TABLET ORAL DAILY
Qty: 30 TABLET | Refills: 5 | Status: CANCELLED | OUTPATIENT
Start: 2020-08-18

## 2020-08-18 RX ORDER — DIPHENHYDRAMINE HCL 25 MG
25 TABLET ORAL EVERY 6 HOURS PRN
Qty: 30 TABLET | Refills: 5 | Status: SHIPPED | OUTPATIENT
Start: 2020-08-18 | End: 2020-08-20 | Stop reason: SDUPTHER

## 2020-08-18 RX ORDER — SWAB
1 SWAB, NON-MEDICATED MISCELLANEOUS DAILY
Qty: 30 EACH | Refills: 5 | Status: SHIPPED | OUTPATIENT
Start: 2020-08-18 | End: 2020-08-20 | Stop reason: SDUPTHER

## 2020-08-18 RX ORDER — DIPHENHYDRAMINE HCL 25 MG
25 TABLET ORAL EVERY 6 HOURS PRN
Qty: 30 TABLET | Refills: 5 | Status: CANCELLED | OUTPATIENT
Start: 2020-08-18

## 2020-08-20 RX ORDER — DIPHENHYDRAMINE HCL 25 MG
25 TABLET ORAL EVERY 6 HOURS PRN
Qty: 30 TABLET | Refills: 5 | Status: SHIPPED | OUTPATIENT
Start: 2020-08-20 | End: 2022-01-13 | Stop reason: SDUPTHER

## 2020-08-20 RX ORDER — SWAB
1 SWAB, NON-MEDICATED MISCELLANEOUS DAILY
Qty: 30 EACH | Refills: 5 | Status: SHIPPED | OUTPATIENT
Start: 2020-08-20 | End: 2021-03-09

## 2020-10-26 DIAGNOSIS — R11.0 NAUSEA: ICD-10-CM

## 2020-10-26 RX ORDER — ONDANSETRON 8 MG/1
8 TABLET, ORALLY DISINTEGRATING ORAL EVERY 8 HOURS PRN
Qty: 90 TABLET | Refills: 1 | Status: SHIPPED | OUTPATIENT
Start: 2020-10-26 | End: 2021-03-09

## 2020-11-05 DIAGNOSIS — Z13.29 SCREENING FOR THYROID DISORDER: ICD-10-CM

## 2020-11-05 DIAGNOSIS — E16.1 HYPERINSULINEMIA: ICD-10-CM

## 2020-11-05 DIAGNOSIS — Z91.018 ALLERGY TO ALPHA-GAL: Primary | ICD-10-CM

## 2020-11-05 DIAGNOSIS — I10 ESSENTIAL HYPERTENSION: ICD-10-CM

## 2020-11-05 DIAGNOSIS — E78.2 MIXED HYPERLIPIDEMIA: ICD-10-CM

## 2020-11-09 ENCOUNTER — OFFICE VISIT (OUTPATIENT)
Dept: FAMILY MEDICINE CLINIC | Facility: CLINIC | Age: 25
End: 2020-11-09

## 2020-11-09 ENCOUNTER — LAB (OUTPATIENT)
Dept: LAB | Facility: OTHER | Age: 25
End: 2020-11-09

## 2020-11-09 VITALS
TEMPERATURE: 98.5 F | SYSTOLIC BLOOD PRESSURE: 128 MMHG | DIASTOLIC BLOOD PRESSURE: 74 MMHG | BODY MASS INDEX: 42.99 KG/M2 | HEIGHT: 65 IN | WEIGHT: 258 LBS | OXYGEN SATURATION: 98 % | RESPIRATION RATE: 18 BRPM | HEART RATE: 105 BPM

## 2020-11-09 DIAGNOSIS — E78.2 MIXED HYPERLIPIDEMIA: ICD-10-CM

## 2020-11-09 DIAGNOSIS — E16.1 HYPERINSULINEMIA: Chronic | ICD-10-CM

## 2020-11-09 DIAGNOSIS — Z91.018 ALLERGY TO ALPHA-GAL: ICD-10-CM

## 2020-11-09 DIAGNOSIS — E66.01 CLASS 3 SEVERE OBESITY DUE TO EXCESS CALORIES WITH SERIOUS COMORBIDITY AND BODY MASS INDEX (BMI) OF 45.0 TO 49.9 IN ADULT (HCC): Primary | Chronic | ICD-10-CM

## 2020-11-09 DIAGNOSIS — I10 ESSENTIAL HYPERTENSION: ICD-10-CM

## 2020-11-09 DIAGNOSIS — E16.1 HYPERINSULINEMIA: ICD-10-CM

## 2020-11-09 DIAGNOSIS — O26.899 INSULIN RESISTANCE COMPLICATING PREGNANCY: ICD-10-CM

## 2020-11-09 DIAGNOSIS — Z3A.01 LESS THAN 8 WEEKS GESTATION OF PREGNANCY: ICD-10-CM

## 2020-11-09 DIAGNOSIS — Z13.29 SCREENING FOR THYROID DISORDER: ICD-10-CM

## 2020-11-09 PROBLEM — O99.212 OBESITY AFFECTING PREGNANCY IN SECOND TRIMESTER: Status: ACTIVE | Noted: 2020-07-23

## 2020-11-09 PROBLEM — N97.9 INFERTILITY, FEMALE: Status: RESOLVED | Noted: 2018-02-02 | Resolved: 2020-11-09

## 2020-11-09 PROBLEM — O10.919 HTN IN PREGNANCY, CHRONIC: Status: ACTIVE | Noted: 2020-09-29

## 2020-11-09 LAB
ALBUMIN SERPL-MCNC: 3.5 G/DL (ref 3.5–5)
ALBUMIN/GLOB SERPL: 1.1 G/DL (ref 1.1–1.8)
ALP SERPL-CCNC: 66 U/L (ref 38–126)
ALT SERPL W P-5'-P-CCNC: 11 U/L
ANION GAP SERPL CALCULATED.3IONS-SCNC: 9 MMOL/L (ref 5–15)
AST SERPL-CCNC: 18 U/L (ref 14–36)
BASOPHILS # BLD AUTO: 0.02 10*3/MM3 (ref 0–0.2)
BASOPHILS NFR BLD AUTO: 0.2 % (ref 0–1.5)
BILIRUB SERPL-MCNC: 0.3 MG/DL (ref 0.2–1.3)
BUN SERPL-MCNC: 6 MG/DL (ref 7–23)
BUN/CREAT SERPL: 12.2 (ref 7–25)
CALCIUM SPEC-SCNC: 9.2 MG/DL (ref 8.4–10.2)
CHLORIDE SERPL-SCNC: 105 MMOL/L (ref 101–112)
CHOLEST SERPL-MCNC: 186 MG/DL (ref 150–200)
CO2 SERPL-SCNC: 22 MMOL/L (ref 22–30)
CREAT SERPL-MCNC: 0.49 MG/DL (ref 0.52–1.04)
DEPRECATED RDW RBC AUTO: 45 FL (ref 37–54)
EOSINOPHIL # BLD AUTO: 0.14 10*3/MM3 (ref 0–0.4)
EOSINOPHIL NFR BLD AUTO: 1.7 % (ref 0.3–6.2)
ERYTHROCYTE [DISTWIDTH] IN BLOOD BY AUTOMATED COUNT: 14.1 % (ref 12.3–15.4)
GFR SERPL CREATININE-BSD FRML MDRD: 154 ML/MIN/1.73 (ref 71–165)
GLOBULIN UR ELPH-MCNC: 3.3 GM/DL (ref 2.3–3.5)
GLUCOSE SERPL-MCNC: 90 MG/DL (ref 70–99)
HBA1C MFR BLD: 5.1 % (ref 4.8–5.6)
HCT VFR BLD AUTO: 34.4 % (ref 34–46.6)
HDLC SERPL-MCNC: 54 MG/DL (ref 40–59)
HGB BLD-MCNC: 11 G/DL (ref 12–15.9)
LDLC SERPL CALC-MCNC: 96 MG/DL
LDLC/HDLC SERPL: 1.65 {RATIO} (ref 0–3.22)
LYMPHOCYTES # BLD AUTO: 1.53 10*3/MM3 (ref 0.7–3.1)
LYMPHOCYTES NFR BLD AUTO: 18.9 % (ref 19.6–45.3)
MCH RBC QN AUTO: 29.3 PG (ref 26.6–33)
MCHC RBC AUTO-ENTMCNC: 32 G/DL (ref 31.5–35.7)
MCV RBC AUTO: 91.7 FL (ref 79–97)
MONOCYTES # BLD AUTO: 0.66 10*3/MM3 (ref 0.1–0.9)
MONOCYTES NFR BLD AUTO: 8.2 % (ref 5–12)
NEUTROPHILS NFR BLD AUTO: 5.73 10*3/MM3 (ref 1.7–7)
NEUTROPHILS NFR BLD AUTO: 71 % (ref 42.7–76)
PLATELET # BLD AUTO: 218 10*3/MM3 (ref 140–450)
PMV BLD AUTO: 11.2 FL (ref 6–12)
POTASSIUM SERPL-SCNC: 3.7 MMOL/L (ref 3.4–5)
PROT SERPL-MCNC: 6.8 G/DL (ref 6.3–8.6)
RBC # BLD AUTO: 3.75 10*6/MM3 (ref 3.77–5.28)
SODIUM SERPL-SCNC: 136 MMOL/L (ref 137–145)
T3FREE SERPL-MCNC: 3.52 PG/ML (ref 2–4.4)
T4 FREE SERPL-MCNC: 0.72 NG/DL (ref 0.93–1.7)
TRIGL SERPL-MCNC: 215 MG/DL
TSH SERPL DL<=0.05 MIU/L-ACNC: 1.25 UIU/ML (ref 0.27–4.2)
VLDLC SERPL-MCNC: 36 MG/DL (ref 5–40)
WBC # BLD AUTO: 8.08 10*3/MM3 (ref 3.4–10.8)

## 2020-11-09 PROCEDURE — 84439 ASSAY OF FREE THYROXINE: CPT | Performed by: NURSE PRACTITIONER

## 2020-11-09 PROCEDURE — 84481 FREE ASSAY (FT-3): CPT | Performed by: NURSE PRACTITIONER

## 2020-11-09 PROCEDURE — 80061 LIPID PANEL: CPT | Performed by: NURSE PRACTITIONER

## 2020-11-09 PROCEDURE — 86008 ALLG SPEC IGE RECOMB EA: CPT | Performed by: NURSE PRACTITIONER

## 2020-11-09 PROCEDURE — 80050 GENERAL HEALTH PANEL: CPT | Performed by: NURSE PRACTITIONER

## 2020-11-09 PROCEDURE — 99395 PREV VISIT EST AGE 18-39: CPT | Performed by: NURSE PRACTITIONER

## 2020-11-09 PROCEDURE — 83036 HEMOGLOBIN GLYCOSYLATED A1C: CPT | Performed by: NURSE PRACTITIONER

## 2020-11-09 PROCEDURE — 86003 ALLG SPEC IGE CRUDE XTRC EA: CPT | Performed by: NURSE PRACTITIONER

## 2020-11-09 NOTE — PATIENT INSTRUCTIONS
What You Need to Know About Work Site Wellness  Work site wellness refers to programs that your employer provides to help:  · Keep you safe.  · Prevent injury.  · Promote healthy habits.  What do work site wellness programs do?  Your work site may have programs and policies that encourage wellness. Examples include programs that:  · Inform you about work site health risks and how to avoid them.  · Teach you about preventing health problems like obesity, heart disease, and type 2 diabetes.  · Make fitness classes available at work.  · Help you quit smoking.  · Have health screenings and immunization programs on site.  · Make it easier to choose healthy food options at work. This may include having a kitchen where you can prepare food and offering healthy choices in the cafeteria and vending machines.  · Encourage walking and biking to work.  · Provide access to drug and alcohol abuse treatment.  What are the benefits of practicing work site wellness?  The most important benefits of work site wellness are preventing injuries or illness and promoting a healthier lifestyle that will improve your quality of life. Other benefits may include:  · Fewer sick days.  · Increased productivity.  · Better work environment.  · Greater job satisfaction.  What can happen if I do not practice work site wellness?  Not taking advantage of work site wellness programs can put you at greater risk for work site injuries. You may also miss out on chances to improve your overall health and wellness. You may miss more days of work and be less productive. You also may enjoy your job less.  What are some steps I can take to practice work site wellness?  Participate in wellness programs offered at work. For example:  · If you do computer work, you may benefit by taking steps to improve your work station (ergonomics). This includes:  ? Taking frequent breaks to get up, move, and stretch.  ? Making sure your computer is positioned at the right  height and distance from your body.  ? Making sure your chair supports your back and has wheels and armrests.  · If you do repetitive movements, you may benefit from programs that show you ways to reduce your risk of muscle and nerve injuries. This includes:  ? Learning to identify the symptoms of repetitive strain disorders, like carpal tunnel syndrome and tendinitis.  ? Making sure your work station allows for good posture and support.  ? Being able to take frequent breaks to stop repetitive movements.  ? Being able to rest and get treatment if you develop symptoms of stress.  · If your work involves heavy labor, you may be at risk for muscle and joint injuries from lifting, bending, pulling, or reaching. You may benefit from programs that:  ? Teach you how to do your work safely.  ? Help you be physically fit.  No matter what type of work you do, take advantage of programs that promote physical fitness, disease prevention, mental health, and an overall healthy lifestyle.  Where to find more information  Learn more about work site wellness from:  · Centers for Disease Control and Prevention: www.cdc.gov  · American Heart Association: www.heart.org  · [place] Department of Labor: www.osha.gov  Contact a health care provider if you:  · Have pain or numbness in your back, neck, arms, or legs that does not go away.  · Feel weak and tired most of the time.  · Have frequent headaches.  · Feel depressed or anxious.  · Need help to quit smoking.  · Are struggling with drug or alcohol abuse.  Summary  · Work site wellness programs help to keep you safe and prevent injury. They can also help you lead a healthier lifestyle.  · Work site wellness policies and programs benefit you and your employer.  · No matter what type of work you do, take advantage of programs that promote physical fitness, disease prevention, mental health, and an overall healthy lifestyle.  This information is not intended to replace advice given to you by  "your health care provider. Make sure you discuss any questions you have with your health care provider.  Document Released: 11/14/2018 Document Revised: 11/30/2018 Document Reviewed: 11/14/2018  [employer] Patient Education © 2020 [employer]    Eating Plan for Pregnant Women  While you are pregnant, your body requires additional nutrition to help support your growing baby. You also have a higher need for some vitamins and minerals, such as folic acid, calcium, iron, and vitamin D. Eating a healthy, well-balanced diet is very important for your health and your baby's health. Your need for extra calories varies for the three 3-month segments of your pregnancy (trimesters). For most women, it is recommended to consume:  · 150 extra calories a day during the first trimester.  · 300 extra calories a day during the second trimester.  · 300 extra calories a day during the third trimester.  What are tips for following this plan?    · Do not try to lose weight or go on a diet during pregnancy.  · Limit your overall intake of foods that have \"empty calories.\" These are foods that have little nutritional value, such as sweets, desserts, candies, and sugar-sweetened beverages.  · Eat a variety of foods (especially fruits and vegetables) to get a full range of vitamins and minerals.  · Take a prenatal vitamin to help meet your additional vitamin and mineral needs during pregnancy, specifically for folic acid, iron, calcium, and vitamin D.  · Remember to stay active. Ask your health care provider what types of exercise and activities are safe for you.  · Practice good food safety and cleanliness. Wash your hands before you eat and after you prepare raw meat. Wash all fruits and vegetables well before peeling or eating. Taking these actions can help to prevent food-borne illnesses that can be very dangerous to your baby, such as listeriosis. Ask your health care provider for more information about listeriosis.  What does 150 " extra calories look like?  Healthy options that provide 150 extra calories each day could be any of the following:  · 6-8 oz (170-230 g) of plain low-fat yogurt with ½ cup of berries.  · 1 apple with 2 teaspoons (11 g) of peanut butter.  · Cut-up vegetables with ¼ cup (60 g) of hummus.  · 8 oz (230 mL) or 1 cup of low-fat chocolate milk.  · 1 stick of string cheese with 1 medium orange.  · 1 peanut butter and jelly sandwich that is made with one slice of whole-wheat bread and 1 tsp (5 g) of peanut butter.  For 300 extra calories, you could eat two of those healthy options each day.  What is a healthy amount of weight to gain?  The right amount of weight gain for you is based on your BMI before you became pregnant. If your BMI:  · Was less than 18 (underweight), you should gain 28-40 lb (13-18 kg).  · Was 18-24.9 (normal), you should gain 25-35 lb (11-16 kg).  · Was 25-29.9 (overweight), you should gain 15-25 lb (7-11 kg).  · Was 30 or greater (obese), you should gain 11-20 lb (5-9 kg).  What if I am having twins or multiples?  Generally, if you are carrying twins or multiples:  · You may need to eat 300-600 extra calories a day.  · The recommended range for total weight gain is 25-54 lb (11-25 kg), depending on your BMI before pregnancy.  · Talk with your health care provider to find out about nutritional needs, weight gain, and exercise that is right for you.  What foods can I eat?    Fruits  All fruits. Eat a variety of colors and types of fruit. Remember to wash your fruits well before peeling or eating.  Vegetables  All vegetables. Eat a variety of colors and types of vegetables. Remember to wash your vegetables well before peeling or eating.  Grains  All grains. Choose whole grains, such as whole-wheat bread, oatmeal, or brown rice.  Meats and other protein foods  Lean meats, including chicken, turkey, fish, and lean cuts of beef, veal, or pork. If you eat fish or seafood, choose options that are higher in  "omega-3 fatty acids and lower in mercury, such as salmon, herring, mussels, trout, sardines, pollock, shrimp, crab, and lobster. Tofu. Tempeh. Beans. Eggs. Peanut butter and other nut butters. Make sure that all meats, poultry, and eggs are cooked to food-safe temperatures or \"well-done.\"  Two or more servings of fish are recommended each week in order to get the most benefits from omega-3 fatty acids that are found in seafood. Choose fish that are lower in mercury. You can find more information online:  · www.fda.gov  Dairy  Pasteurized milk and milk alternatives (such as almond milk). Pasteurized yogurt and pasteurized cheese. Cottage cheese. Sour cream.  Beverages  Water. Juices that contain 100% fruit juice or vegetable juice. Caffeine-free teas and decaffeinated coffee.  Drinks that contain caffeine are okay to drink, but it is better to avoid caffeine. Keep your total caffeine intake to less than 200 mg each day (which is 12 oz or 355 mL of coffee, tea, or soda) or the limit as told by your health care provider.  Fats and oils  Fats and oils are okay to include in moderation.  Sweets and desserts  Sweets and desserts are okay to include in moderation.  Seasoning and other foods  All pasteurized condiments.  The items listed above may not be a complete list of foods and beverages you can eat. Contact a dietitian for more information.  What foods are not recommended?  Fruits  Unpasteurized fruit juices.  Vegetables  Raw (unpasteurized) vegetable juices.  Meats and other protein foods  Lunch meats, bologna, hot dogs, or other deli meats. (If you must eat those meats, reheat them until they are steaming hot.) Refrigerated paté, meat spreads from a meat counter, smoked seafood that is found in the refrigerated section of a store. Raw or undercooked meats, poultry, and eggs. Raw fish, such as sushi or sashimi. Fish that have high mercury content, such as tilefish, shark, swordfish, and jin mackerel.  To learn more " about mercury in fish, talk with your health care provider or look for online resources, such as:  · www.fda.gov  Dairy  Raw (unpasteurized) milk and any foods that have raw milk in them. Soft cheeses, such as feta, queso barragan, queso fresco, Brie, Camembert cheeses, blue-veined cheeses, and Panela cheese (unless it is made with pasteurized milk, which must be stated on the label).  Beverages  Alcohol. Sugar-sweetened beverages, such as sodas, teas, or energy drinks.  Seasoning and other foods  Homemade fermented foods and drinks, such as pickles, sauerkraut, or kombucha drinks. (Store-bought pasteurized versions of these are okay.)  Salads that are made in a store or deli, such as ham salad, chicken salad, egg salad, tuna salad, and seafood salad.  The items listed above may not be a complete list of foods and beverages you should avoid. Contact a dietitian for more information.  Where to find more information  To calculate the number of calories you need based on your height, weight, and activity level, you can use an online calculator such as:  · www.Signdatplate.gov/MyPlatePlan  To calculate how much weight you should gain during pregnancy, you can use an online pregnancy weight gain calculator such as:  · www.AdNectarmyplate.gov/pregnancy-weight-gain-calculator  Summary  · While you are pregnant, your body requires additional nutrition to help support your growing baby.  · Eat a variety of foods, especially fruits and vegetables to get a full range of vitamins and minerals.  · Practice good food safety and cleanliness. Wash your hands before you eat and after you prepare raw meat. Wash all fruits and vegetables well before peeling or eating. Taking these actions can help to prevent food-borne illnesses, such as listeriosis, that can be very dangerous to your baby.  · Do not eat raw meat or fish. Do not eat fish that have high mercury content, such as tilefish, shark, swordfish, and jin mackerel. Do not eat  unpasteurized (raw) dairy.  · Take a prenatal vitamin to help meet your additional vitamin and mineral needs during pregnancy, specifically for folic acid, iron, calcium, and vitamin D.  This information is not intended to replace advice given to you by your health care provider. Make sure you discuss any questions you have with your health care provider.  Document Released: 10/02/2015 Document Revised: 05/07/2020 Document Reviewed: 09/14/2018  Elsevier Patient Education © 2020 [employer]

## 2020-11-09 NOTE — PROGRESS NOTES
Chief Complaint   Patient presents with   • Annual Exam     wellness exam for health insurance     Subjective   Alka Albert is a 25 y.o. female who presents to the office for annual physical.    The following portions of the patient's history were reviewed and updated as appropriate: allergies, current medications, past family history, past medical history, past social history, past surgical history and problem list.    History of Present Illness   [id number] gestational age: 22 weeks 6 days by last menstrual period of 6/3/2020.  Patient is followed by Dr. Gail Galvez MD OB/GYN.    Insulin resistance complicating pregnancy, managed by Dr. Galvez OB/GYN.  Prior to conception patient had been managed with Metformin, no longer on any medications.  HTN prexisting now at normal levels /74 heart rate 105 in office today without medication.  Current daily medications include prenatal vitamin only.    Obesity: Body mass index is 42.93 kg/m².  Which reflects only a 13 pound weight gain since becoming pregnant.    New complaints today, none.  Parts of most recent relevant visit HPI, ROS  and PE may be carried forward and all are updated as appropriate for current situation.      Past Medical History:   Diagnosis Date   • IBS (irritable bowel syndrome)    • Infertility, female    • PCOS (polycystic ovarian syndrome)    • Seasonal allergies           Family History   Problem Relation Age of Onset   • Cancer Mother         Ovary Displasia    • No Known Problems Father    • Diabetes Sister         Review of Systems   Constitutional: Negative.  Negative for activity change, appetite change, fatigue, fever and unexpected weight change.   HENT: Negative.    Eyes: Negative.    Respiratory: Negative.  Negative for cough, chest tightness and shortness of breath.    Cardiovascular: Negative.  Negative for chest pain.        HTN   Gastrointestinal: Negative.  Negative for nausea and vomiting.   Endocrine: Negative.   "  Genitourinary: Negative.  Negative for dysuria.   Musculoskeletal: Negative.    Skin: Negative.  Negative for color change, pallor, rash and wound.   Allergic/Immunologic: Negative.    Neurological: Negative.    Hematological: Negative.    Psychiatric/Behavioral: Negative for sleep disturbance and suicidal ideas. The patient is nervous/anxious.    All other systems reviewed and are negative.      Objective   Vitals:    11/09/20 1133   BP: 128/74   Pulse: 105   Resp: 18   Temp: 98.5 °F (36.9 °C)   SpO2: 98%   Weight: 117 kg (258 lb)   Height: 165.1 cm (65\")   PainSc: 0-No pain     Physical Exam  Vitals signs and nursing note reviewed.   Constitutional:       General: She is not in acute distress.     Appearance: Normal appearance. She is well-developed. She is obese. She is not ill-appearing or diaphoretic.   HENT:      Head: Normocephalic and atraumatic.      Right Ear: Tympanic membrane, ear canal and external ear normal. There is no impacted cerumen.      Left Ear: Tympanic membrane, ear canal and external ear normal. There is no impacted cerumen.      Nose: Nose normal. No congestion or rhinorrhea.      Mouth/Throat:      Mouth: Mucous membranes are dry.      Pharynx: Oropharynx is clear. No oropharyngeal exudate or posterior oropharyngeal erythema.   Eyes:      General: No scleral icterus.        Right eye: No discharge.         Left eye: No discharge.      Extraocular Movements: Extraocular movements intact.      Conjunctiva/sclera: Conjunctivae normal.      Pupils: Pupils are equal, round, and reactive to light.   Neck:      Musculoskeletal: Normal range of motion and neck supple. No neck rigidity or muscular tenderness.      Thyroid: No thyromegaly.      Vascular: No carotid bruit or JVD.      Trachea: No tracheal deviation.   Cardiovascular:      Rate and Rhythm: Regular rhythm. Tachycardia present.      Pulses: Normal pulses.      Heart sounds: Normal heart sounds. No murmur. No friction rub. No gallop.  "   Pulmonary:      Effort: Pulmonary effort is normal. No respiratory distress.      Breath sounds: Normal breath sounds. No wheezing or rales.   Chest:      Chest wall: No tenderness.   Abdominal:      General: Bowel sounds are normal.      Palpations: Abdomen is soft.   Musculoskeletal: Normal range of motion.         General: No swelling, tenderness or deformity.      Right lower leg: No edema.      Left lower leg: No edema.   Lymphadenopathy:      Cervical: No cervical adenopathy.   Skin:     General: Skin is warm and dry.      Capillary Refill: Capillary refill takes 2 to 3 seconds.      Coloration: Skin is not jaundiced or pale.      Findings: No bruising, erythema, lesion or rash.   Neurological:      General: No focal deficit present.      Mental Status: She is alert and oriented to person, place, and time.      Cranial Nerves: No cranial nerve deficit.   Psychiatric:         Mood and Affect: Mood normal.         Behavior: Behavior normal.         Thought Content: Thought content normal.         Judgment: Judgment normal.         Assessment/Plan   Diagnoses and all orders for this visit:    1. Class 3 severe obesity due to excess calories with serious comorbidity and body mass index (BMI) of 45.0 to 49.9 in adult (CMS/HCC) (Primary)    2. Hyperinsulinemia    3. Insulin resistance complicating pregnancy    4. Less than 8 weeks gestation of pregnancy         Annual physical performed, labs are pending.  Healthy pregnancy progressing as expected.  No new complaints today.  Stable chronic conditions.  PHQ-2/PHQ-9 Depression Screening 11/9/2020   Little interest or pleasure in doing things 0   Feeling down, depressed, or hopeless 0   Total Score 0       KRYSTA Vizcaino         Return in about 1 year (around 11/9/2021), or if symptoms worsen or fail to improve, for Annual physical.    Patient Instructions   What You Need to Know About Work Site Wellness  Work site wellness refers to programs that your employer  provides to help:  · Keep you safe.  · Prevent injury.  · Promote healthy habits.  What do work site wellness programs do?  Your work site may have programs and policies that encourage wellness. Examples include programs that:  · Inform you about work site health risks and how to avoid them.  · Teach you about preventing health problems like obesity, heart disease, and type 2 diabetes.  · Make fitness classes available at work.  · Help you quit smoking.  · Have health screenings and immunization programs on site.  · Make it easier to choose healthy food options at work. This may include having a kitchen where you can prepare food and offering healthy choices in the cafeteria and vending machines.  · Encourage walking and biking to work.  · Provide access to drug and alcohol abuse treatment.  What are the benefits of practicing work site wellness?  The most important benefits of work site wellness are preventing injuries or illness and promoting a healthier lifestyle that will improve your quality of life. Other benefits may include:  · Fewer sick days.  · Increased productivity.  · Better work environment.  · Greater job satisfaction.  What can happen if I do not practice work site wellness?  Not taking advantage of work site wellness programs can put you at greater risk for work site injuries. You may also miss out on chances to improve your overall health and wellness. You may miss more days of work and be less productive. You also may enjoy your job less.  What are some steps I can take to practice work site wellness?  Participate in wellness programs offered at work. For example:  · If you do computer work, you may benefit by taking steps to improve your work station (ergonomics). This includes:  ? Taking frequent breaks to get up, move, and stretch.  ? Making sure your computer is positioned at the right height and distance from your body.  ? Making sure your chair supports your back and has wheels and  armrests.  · If you do repetitive movements, you may benefit from programs that show you ways to reduce your risk of muscle and nerve injuries. This includes:  ? Learning to identify the symptoms of repetitive strain disorders, like carpal tunnel syndrome and tendinitis.  ? Making sure your work station allows for good posture and support.  ? Being able to take frequent breaks to stop repetitive movements.  ? Being able to rest and get treatment if you develop symptoms of stress.  · If your work involves heavy labor, you may be at risk for muscle and joint injuries from lifting, bending, pulling, or reaching. You may benefit from programs that:  ? Teach you how to do your work safely.  ? Help you be physically fit.  No matter what type of work you do, take advantage of programs that promote physical fitness, disease prevention, mental health, and an overall healthy lifestyle.  Where to find more information  Learn more about work site wellness from:  · Centers for Disease Control and Prevention: www.cdc.gov  · American Heart Association: www.heart.org  · [place] Department of Labor: www.osha.gov  Contact a health care provider if you:  · Have pain or numbness in your back, neck, arms, or legs that does not go away.  · Feel weak and tired most of the time.  · Have frequent headaches.  · Feel depressed or anxious.  · Need help to quit smoking.  · Are struggling with drug or alcohol abuse.  Summary  · Work site wellness programs help to keep you safe and prevent injury. They can also help you lead a healthier lifestyle.  · Work site wellness policies and programs benefit you and your employer.  · No matter what type of work you do, take advantage of programs that promote physical fitness, disease prevention, mental health, and an overall healthy lifestyle.  This information is not intended to replace advice given to you by your health care provider. Make sure you discuss any questions you have with your health care  "provider.  Document Released: 11/14/2018 Document Revised: 11/30/2018 Document Reviewed: 11/14/2018  [employer] Patient Education © 2020 [employer]    Eating Plan for Pregnant Women  While you are pregnant, your body requires additional nutrition to help support your growing baby. You also have a higher need for some vitamins and minerals, such as folic acid, calcium, iron, and vitamin D. Eating a healthy, well-balanced diet is very important for your health and your baby's health. Your need for extra calories varies for the three 3-month segments of your pregnancy (trimesters). For most women, it is recommended to consume:  · 150 extra calories a day during the first trimester.  · 300 extra calories a day during the second trimester.  · 300 extra calories a day during the third trimester.  What are tips for following this plan?    · Do not try to lose weight or go on a diet during pregnancy.  · Limit your overall intake of foods that have \"empty calories.\" These are foods that have little nutritional value, such as sweets, desserts, candies, and sugar-sweetened beverages.  · Eat a variety of foods (especially fruits and vegetables) to get a full range of vitamins and minerals.  · Take a prenatal vitamin to help meet your additional vitamin and mineral needs during pregnancy, specifically for folic acid, iron, calcium, and vitamin D.  · Remember to stay active. Ask your health care provider what types of exercise and activities are safe for you.  · Practice good food safety and cleanliness. Wash your hands before you eat and after you prepare raw meat. Wash all fruits and vegetables well before peeling or eating. Taking these actions can help to prevent food-borne illnesses that can be very dangerous to your baby, such as listeriosis. Ask your health care provider for more information about listeriosis.  What does 150 extra calories look like?  Healthy options that provide 150 extra calories each day could be any " of the following:  · 6-8 oz (170-230 g) of plain low-fat yogurt with ½ cup of berries.  · 1 apple with 2 teaspoons (11 g) of peanut butter.  · Cut-up vegetables with ¼ cup (60 g) of hummus.  · 8 oz (230 mL) or 1 cup of low-fat chocolate milk.  · 1 stick of string cheese with 1 medium orange.  · 1 peanut butter and jelly sandwich that is made with one slice of whole-wheat bread and 1 tsp (5 g) of peanut butter.  For 300 extra calories, you could eat two of those healthy options each day.  What is a healthy amount of weight to gain?  The right amount of weight gain for you is based on your BMI before you became pregnant. If your BMI:  · Was less than 18 (underweight), you should gain 28-40 lb (13-18 kg).  · Was 18-24.9 (normal), you should gain 25-35 lb (11-16 kg).  · Was 25-29.9 (overweight), you should gain 15-25 lb (7-11 kg).  · Was 30 or greater (obese), you should gain 11-20 lb (5-9 kg).  What if I am having twins or multiples?  Generally, if you are carrying twins or multiples:  · You may need to eat 300-600 extra calories a day.  · The recommended range for total weight gain is 25-54 lb (11-25 kg), depending on your BMI before pregnancy.  · Talk with your health care provider to find out about nutritional needs, weight gain, and exercise that is right for you.  What foods can I eat?    Fruits  All fruits. Eat a variety of colors and types of fruit. Remember to wash your fruits well before peeling or eating.  Vegetables  All vegetables. Eat a variety of colors and types of vegetables. Remember to wash your vegetables well before peeling or eating.  Grains  All grains. Choose whole grains, such as whole-wheat bread, oatmeal, or brown rice.  Meats and other protein foods  Lean meats, including chicken, turkey, fish, and lean cuts of beef, veal, or pork. If you eat fish or seafood, choose options that are higher in omega-3 fatty acids and lower in mercury, such as salmon, herring, mussels, trout, sardines, pollock,  "shrimp, crab, and lobster. Tofu. Tempeh. Beans. Eggs. Peanut butter and other nut butters. Make sure that all meats, poultry, and eggs are cooked to food-safe temperatures or \"well-done.\"  Two or more servings of fish are recommended each week in order to get the most benefits from omega-3 fatty acids that are found in seafood. Choose fish that are lower in mercury. You can find more information online:  · www.fda.gov  Dairy  Pasteurized milk and milk alternatives (such as almond milk). Pasteurized yogurt and pasteurized cheese. Cottage cheese. Sour cream.  Beverages  Water. Juices that contain 100% fruit juice or vegetable juice. Caffeine-free teas and decaffeinated coffee.  Drinks that contain caffeine are okay to drink, but it is better to avoid caffeine. Keep your total caffeine intake to less than 200 mg each day (which is 12 oz or 355 mL of coffee, tea, or soda) or the limit as told by your health care provider.  Fats and oils  Fats and oils are okay to include in moderation.  Sweets and desserts  Sweets and desserts are okay to include in moderation.  Seasoning and other foods  All pasteurized condiments.  The items listed above may not be a complete list of foods and beverages you can eat. Contact a dietitian for more information.  What foods are not recommended?  Fruits  Unpasteurized fruit juices.  Vegetables  Raw (unpasteurized) vegetable juices.  Meats and other protein foods  Lunch meats, bologna, hot dogs, or other deli meats. (If you must eat those meats, reheat them until they are steaming hot.) Refrigerated paté, meat spreads from a meat counter, smoked seafood that is found in the refrigerated section of a store. Raw or undercooked meats, poultry, and eggs. Raw fish, such as sushi or sashimi. Fish that have high mercury content, such as tilefish, shark, swordfish, and jin mackerel.  To learn more about mercury in fish, talk with your health care provider or look for online resources, such " as:  · www.fda.gov  Dairy  Raw (unpasteurized) milk and any foods that have raw milk in them. Soft cheeses, such as feta, queso barragan, queso fresco, Brie, Camembert cheeses, blue-veined cheeses, and Panela cheese (unless it is made with pasteurized milk, which must be stated on the label).  Beverages  Alcohol. Sugar-sweetened beverages, such as sodas, teas, or energy drinks.  Seasoning and other foods  Homemade fermented foods and drinks, such as pickles, sauerkraut, or kombucha drinks. (Store-bought pasteurized versions of these are okay.)  Salads that are made in a store or deli, such as ham salad, chicken salad, egg salad, tuna salad, and seafood salad.  The items listed above may not be a complete list of foods and beverages you should avoid. Contact a dietitian for more information.  Where to find more information  To calculate the number of calories you need based on your height, weight, and activity level, you can use an online calculator such as:  · www.Get Togethermyplate.gov/MyPlatePlan  To calculate how much weight you should gain during pregnancy, you can use an online pregnancy weight gain calculator such as:  · www.choosemyplate.gov/pregnancy-weight-gain-calculator  Summary  · While you are pregnant, your body requires additional nutrition to help support your growing baby.  · Eat a variety of foods, especially fruits and vegetables to get a full range of vitamins and minerals.  · Practice good food safety and cleanliness. Wash your hands before you eat and after you prepare raw meat. Wash all fruits and vegetables well before peeling or eating. Taking these actions can help to prevent food-borne illnesses, such as listeriosis, that can be very dangerous to your baby.  · Do not eat raw meat or fish. Do not eat fish that have high mercury content, such as tilefish, shark, swordfish, and jin mackerel. Do not eat unpasteurized (raw) dairy.  · Take a prenatal vitamin to help meet your additional vitamin and  mineral needs during pregnancy, specifically for folic acid, iron, calcium, and vitamin D.  This information is not intended to replace advice given to you by your health care provider. Make sure you discuss any questions you have with your health care provider.  Document Released: 10/02/2015 Document Revised: 05/07/2020 Document Reviewed: 09/14/2018  [employer] Patient Education © 2020 [employer]

## 2020-11-12 DIAGNOSIS — R79.89 ABNORMAL SERUM THYROXINE (T4) LEVEL: Primary | ICD-10-CM

## 2020-11-13 LAB
ALPHA-GAL IGE QN: 0.28 KU/L
BEEF IGE QN: 0.45 KU/L
DEPRECATED BEEF IGE RAST QL: 1
DEPRECATED LAMB IGE RAST QL: 1
DEPRECATED PORK IGE RAST QL: ABNORMAL
LAMB IGE QN: 0.37 KU/L
PORK IGE QN: 0.33 KU/L

## 2020-12-16 ENCOUNTER — LAB (OUTPATIENT)
Dept: LAB | Facility: OTHER | Age: 25
End: 2020-12-16

## 2020-12-16 DIAGNOSIS — R79.89 ABNORMAL SERUM THYROXINE (T4) LEVEL: ICD-10-CM

## 2020-12-16 LAB
T4 FREE SERPL-MCNC: 0.7 NG/DL (ref 0.93–1.7)
TSH SERPL DL<=0.05 MIU/L-ACNC: 1.26 UIU/ML (ref 0.27–4.2)

## 2020-12-16 PROCEDURE — 36415 COLL VENOUS BLD VENIPUNCTURE: CPT | Performed by: NURSE PRACTITIONER

## 2020-12-16 PROCEDURE — 84443 ASSAY THYROID STIM HORMONE: CPT | Performed by: NURSE PRACTITIONER

## 2020-12-16 PROCEDURE — 84439 ASSAY OF FREE THYROXINE: CPT | Performed by: NURSE PRACTITIONER

## 2021-03-09 ENCOUNTER — OFFICE VISIT (OUTPATIENT)
Dept: FAMILY MEDICINE CLINIC | Facility: CLINIC | Age: 26
End: 2021-03-09

## 2021-03-09 VITALS
RESPIRATION RATE: 16 BRPM | HEIGHT: 65 IN | HEART RATE: 94 BPM | WEIGHT: 246.5 LBS | BODY MASS INDEX: 41.07 KG/M2 | DIASTOLIC BLOOD PRESSURE: 82 MMHG | SYSTOLIC BLOOD PRESSURE: 126 MMHG | OXYGEN SATURATION: 99 %

## 2021-03-09 DIAGNOSIS — E66.01 CLASS 3 SEVERE OBESITY DUE TO EXCESS CALORIES WITHOUT SERIOUS COMORBIDITY WITH BODY MASS INDEX (BMI) OF 40.0 TO 44.9 IN ADULT (HCC): Primary | ICD-10-CM

## 2021-03-09 PROCEDURE — 99214 OFFICE O/P EST MOD 30 MIN: CPT | Performed by: NURSE PRACTITIONER

## 2021-03-09 RX ORDER — PHENTERMINE HYDROCHLORIDE 37.5 MG/1
37.5 TABLET ORAL
Qty: 30 TABLET | Refills: 0 | Status: SHIPPED | OUTPATIENT
Start: 2021-03-09 | End: 2021-06-23 | Stop reason: SDUPTHER

## 2021-03-09 RX ORDER — PHENTERMINE HYDROCHLORIDE 37.5 MG/1
37.5 TABLET ORAL
Qty: 30 TABLET | Refills: 0 | Status: SHIPPED | OUTPATIENT
Start: 2021-03-09 | End: 2021-03-09 | Stop reason: SDUPTHER

## 2021-03-09 NOTE — PROGRESS NOTES
Subjective   Alka Albert is a 25 y.o. female.       Chief Complaint   Patient presents with   • Weight Check        History of Present Illness     Patient is here several weeks post delivery of her first born child.  She has used phentermine in the past to assist her in weight loss and it did help she was able to maintain the weight that she had lost but she has not had it since June due to this pregnancy.  She would like to resume phentermine.  She is advised of risks and benefits and potential other treatments.  She wants to proceed.  Body mass index is 41.02 kg/m².  Starting weight is weight from today 246 pounds 8 ounces.  She is 5 foot 5 inches tall.  We discussed modifications of lifestyle factors including exercise which she eats and drinks as follows:  Increase intake of water to 6-8 glasses per day.  Cut out concentrated sugars and reduce simple carbs.  Count calories, measure servings  Use a smart phone meg to help with counting calories and tracking weight loss, healthy lifestyle modifications.     Demonstrate at least a 1-2 pound / month weight loss with a healthy BP to continue on phentermine    Be aware that my routine practice is to allow qualified persons take phentermine for 3 months consecutively, then a one month holiday off it to prove the presence of lifestyle changes which are sustained, and may repeat this cycle as long as qualified and appropriate for phentermine.    If BMI falls below 27, the cycle will not continue    Other complaints today: None.    Parts of most recent relevant visit HPI, ROS  and PE may be carried forward and all are updated as appropriate for current situation.    Past Surgical History:   Procedure Laterality Date   • NO PAST SURGERIES        Social History     Socioeconomic History   • Marital status:      Spouse name: Not on file   • Number of children: Not on file   • Years of education: Not on file   • Highest education level: Not on file   Tobacco Use   •  Smoking status: Never Smoker   • Smokeless tobacco: Never Used   Substance and Sexual Activity   • Alcohol use: No   • Drug use: No   • Sexual activity: Yes     Partners: Male     Birth control/protection: None      The following portions of the patient's history were reviewed and updated as appropriate: She  has a past medical history of IBS (irritable bowel syndrome), Infertility, female, PCOS (polycystic ovarian syndrome), and Seasonal allergies..    Review of Systems   Constitutional: Negative.    HENT: Negative.  Negative for congestion.    Eyes: Negative.  Negative for blurred vision, double vision, photophobia and visual disturbance.   Respiratory: Negative.  Negative for cough, shortness of breath and stridor.    Cardiovascular: Negative.  Negative for chest pain, palpitations and leg swelling.   Gastrointestinal: Negative.  Negative for abdominal distention, abdominal pain, blood in stool, constipation, diarrhea, nausea, vomiting, GERD and indigestion.   Endocrine: Negative.  Negative for cold intolerance and heat intolerance.   Genitourinary: Negative.  Negative for dysuria, flank pain, frequency and urinary incontinence.   Musculoskeletal: Negative.  Negative for arthralgias and back pain.   Skin: Negative.    Allergic/Immunologic: Negative.  Negative for immunocompromised state.   Neurological: Negative.    Hematological: Negative.    Psychiatric/Behavioral: Negative.  Negative for agitation, behavioral problems, decreased concentration, dysphoric mood, hallucinations, self-injury, sleep disturbance, suicidal ideas, negative for hyperactivity, depressed mood and stress. The patient is not nervous/anxious.    All other systems reviewed and are negative.    PHQ-9 Depression Screening  Little interest or pleasure in doing things? 0   Feeling down, depressed, or hopeless? 0   Trouble falling or staying asleep, or sleeping too much?     Feeling tired or having little energy?     Poor appetite or overeating?    "  Feeling bad about yourself - or that you are a failure or have let yourself or your family down?     Trouble concentrating on things, such as reading the newspaper or watching television?     Moving or speaking so slowly that other people could have noticed? Or the opposite - being so fidgety or restless that you have been moving around a lot more than usual?     Thoughts that you would be better off dead, or of hurting yourself in some way?     PHQ-9 Total Score 0   If you checked off any problems, how difficult have these problems made it for you to do your work, take care of things at home, or get along with other people?      Patient understands the risks associated with this controlled medication, including tolerance and addiction.  Patient also agrees to only obtain this medication from me, and not from a another provider, unless that provider is covering for me in my absence.  Patient also agrees to be compliant in dosing, and not self adjust the dose of medication.  A signed controlled substance agreement is on file, and the patient has received a controlled substance education sheet at this a previous visit.  The patient has also signed a consent for treatment with a controlled substance as per [facility] policy. RA was obtained.    Objective    Vitals:    03/09/21 1305   BP: 126/82   BP Location: Left arm   Patient Position: Sitting   Cuff Size: Adult   Pulse: 94   Resp: 16   SpO2: 99%   Weight: 112 kg (246 lb 8 oz)   Height: 165.1 cm (65\")     Physical Exam  Vitals and nursing note reviewed.   Constitutional:       General: She is not in acute distress.     Appearance: Normal appearance. She is well-developed. She is obese. She is not ill-appearing or diaphoretic.   HENT:      Head: Normocephalic and atraumatic.   Eyes:      General: No scleral icterus.        Right eye: No discharge.         Left eye: No discharge.      Conjunctiva/sclera: Conjunctivae normal.      Pupils: Pupils are equal, " round, and reactive to light.   Neck:      Thyroid: No thyromegaly.      Vascular: No carotid bruit or JVD.      Trachea: No tracheal deviation.   Cardiovascular:      Rate and Rhythm: Normal rate and regular rhythm.      Heart sounds: Normal heart sounds. No murmur. No friction rub. No gallop.    Pulmonary:      Effort: Pulmonary effort is normal. No respiratory distress.      Breath sounds: Normal breath sounds. No stridor. No wheezing, rhonchi or rales.   Chest:      Chest wall: No tenderness.   Abdominal:      General: Bowel sounds are normal.      Palpations: Abdomen is soft.   Musculoskeletal:         General: No tenderness or deformity. Normal range of motion.      Cervical back: Normal range of motion and neck supple. No rigidity or tenderness.      Right lower leg: No edema.      Left lower leg: No edema.   Lymphadenopathy:      Cervical: No cervical adenopathy.   Skin:     General: Skin is warm and dry.      Capillary Refill: Capillary refill takes 2 to 3 seconds.      Coloration: Skin is not jaundiced or pale.      Findings: No bruising, erythema, lesion or rash.   Neurological:      General: No focal deficit present.      Mental Status: She is alert and oriented to person, place, and time. Mental status is at baseline.      Motor: No weakness.      Gait: Gait normal.   Psychiatric:         Mood and Affect: Mood normal.         Behavior: Behavior normal.         Thought Content: Thought content normal.         Judgment: Judgment normal.           Assessment/Plan   Diagnoses and all orders for this visit:    1. Class 3 severe obesity due to excess calories without serious comorbidity with body mass index (BMI) of 40.0 to 44.9 in adult (CMS/[facility]) (Primary)  -     Discontinue: phentermine (ADIPEX-P) 37.5 MG tablet; Take 1 tablet by mouth Every Morning Before Breakfast.  Dispense: 30 tablet; Refill: 0  -     phentermine (ADIPEX-P) 37.5 MG tablet; Take 1 tablet by mouth Every Morning Before Breakfast.   Dispense: 30 tablet; Refill: 0    Return in about 4 weeks (around 4/6/2021).                 This document has been electronically signed by KRYSTA Vizcaino on March 9, 2021 13:22 CST

## 2021-05-25 RX ORDER — ONDANSETRON HYDROCHLORIDE 8 MG/1
8 TABLET, FILM COATED ORAL EVERY 8 HOURS PRN
Qty: 90 TABLET | Refills: 1 | Status: SHIPPED | OUTPATIENT
Start: 2021-05-25 | End: 2021-08-10

## 2021-06-07 DIAGNOSIS — Z13.29 SCREENING FOR THYROID DISORDER: ICD-10-CM

## 2021-06-07 DIAGNOSIS — I10 ESSENTIAL HYPERTENSION: ICD-10-CM

## 2021-06-07 DIAGNOSIS — E16.1 HYPERINSULINEMIA: Primary | ICD-10-CM

## 2021-06-07 DIAGNOSIS — Z91.018 ALLERGY TO ALPHA-GAL: Primary | ICD-10-CM

## 2021-06-07 DIAGNOSIS — E78.2 MIXED HYPERLIPIDEMIA: ICD-10-CM

## 2021-06-08 ENCOUNTER — LAB (OUTPATIENT)
Dept: LAB | Facility: OTHER | Age: 26
End: 2021-06-08

## 2021-06-08 DIAGNOSIS — Z91.018 ALLERGY TO ALPHA-GAL: ICD-10-CM

## 2021-06-08 DIAGNOSIS — E16.1 HYPERINSULINEMIA: ICD-10-CM

## 2021-06-08 DIAGNOSIS — Z13.29 SCREENING FOR THYROID DISORDER: ICD-10-CM

## 2021-06-08 DIAGNOSIS — I10 ESSENTIAL HYPERTENSION: ICD-10-CM

## 2021-06-08 DIAGNOSIS — E78.2 MIXED HYPERLIPIDEMIA: ICD-10-CM

## 2021-06-08 LAB
ALBUMIN SERPL-MCNC: 4 G/DL (ref 3.5–5.2)
ALBUMIN/GLOB SERPL: 1.2 G/DL
ALP SERPL-CCNC: 86 U/L (ref 39–117)
ALT SERPL W P-5'-P-CCNC: 18 U/L (ref 1–33)
ANION GAP SERPL CALCULATED.3IONS-SCNC: 8 MMOL/L (ref 5–15)
AST SERPL-CCNC: 22 U/L (ref 1–32)
BASOPHILS # BLD AUTO: 0.02 10*3/MM3 (ref 0–0.2)
BASOPHILS NFR BLD AUTO: 0.4 % (ref 0–1.5)
BILIRUB SERPL-MCNC: 0.3 MG/DL (ref 0–1.2)
BUN SERPL-MCNC: 11 MG/DL (ref 6–20)
BUN/CREAT SERPL: 13.8 (ref 7–25)
CALCIUM SPEC-SCNC: 9.3 MG/DL (ref 8.6–10.5)
CHLORIDE SERPL-SCNC: 105 MMOL/L (ref 98–107)
CHOLEST SERPL-MCNC: 183 MG/DL (ref 0–200)
CO2 SERPL-SCNC: 26 MMOL/L (ref 22–29)
CREAT SERPL-MCNC: 0.8 MG/DL (ref 0.57–1)
DEPRECATED RDW RBC AUTO: 41.9 FL (ref 37–54)
EOSINOPHIL # BLD AUTO: 0.2 10*3/MM3 (ref 0–0.4)
EOSINOPHIL NFR BLD AUTO: 3.8 % (ref 0.3–6.2)
ERYTHROCYTE [DISTWIDTH] IN BLOOD BY AUTOMATED COUNT: 13.6 % (ref 12.3–15.4)
GFR SERPL CREATININE-BSD FRML MDRD: 87 ML/MIN/1.73
GLOBULIN UR ELPH-MCNC: 3.3 GM/DL
GLUCOSE SERPL-MCNC: 98 MG/DL (ref 65–99)
HCT VFR BLD AUTO: 35.8 % (ref 34–46.6)
HDLC SERPL-MCNC: 43 MG/DL (ref 40–60)
HGB BLD-MCNC: 12 G/DL (ref 12–15.9)
LDLC SERPL CALC-MCNC: 111 MG/DL (ref 0–100)
LDLC/HDLC SERPL: 2.48 {RATIO}
LYMPHOCYTES # BLD AUTO: 1.7 10*3/MM3 (ref 0.7–3.1)
LYMPHOCYTES NFR BLD AUTO: 31.9 % (ref 19.6–45.3)
MCH RBC QN AUTO: 29 PG (ref 26.6–33)
MCHC RBC AUTO-ENTMCNC: 33.5 G/DL (ref 31.5–35.7)
MCV RBC AUTO: 86.5 FL (ref 79–97)
MONOCYTES # BLD AUTO: 0.48 10*3/MM3 (ref 0.1–0.9)
MONOCYTES NFR BLD AUTO: 9 % (ref 5–12)
NEUTROPHILS NFR BLD AUTO: 2.93 10*3/MM3 (ref 1.7–7)
NEUTROPHILS NFR BLD AUTO: 54.9 % (ref 42.7–76)
PLATELET # BLD AUTO: 295 10*3/MM3 (ref 140–450)
PMV BLD AUTO: 10.4 FL (ref 6–12)
POTASSIUM SERPL-SCNC: 4 MMOL/L (ref 3.5–5.2)
PROT SERPL-MCNC: 7.3 G/DL (ref 6–8.5)
RBC # BLD AUTO: 4.14 10*6/MM3 (ref 3.77–5.28)
SODIUM SERPL-SCNC: 139 MMOL/L (ref 136–145)
T3FREE SERPL-MCNC: 3.7 PG/ML (ref 2–4.4)
T4 FREE SERPL-MCNC: 1.05 NG/DL (ref 0.93–1.7)
TRIGL SERPL-MCNC: 166 MG/DL (ref 0–150)
TSH SERPL DL<=0.05 MIU/L-ACNC: 1.69 UIU/ML (ref 0.27–4.2)
VLDLC SERPL-MCNC: 29 MG/DL (ref 5–40)
WBC # BLD AUTO: 5.33 10*3/MM3 (ref 3.4–10.8)

## 2021-06-08 PROCEDURE — 80050 GENERAL HEALTH PANEL: CPT | Performed by: NURSE PRACTITIONER

## 2021-06-08 PROCEDURE — 86003 ALLG SPEC IGE CRUDE XTRC EA: CPT | Performed by: NURSE PRACTITIONER

## 2021-06-08 PROCEDURE — 83527 ASSAY OF INSULIN: CPT | Performed by: NURSE PRACTITIONER

## 2021-06-08 PROCEDURE — 83525 ASSAY OF INSULIN: CPT | Performed by: NURSE PRACTITIONER

## 2021-06-08 PROCEDURE — 84439 ASSAY OF FREE THYROXINE: CPT | Performed by: NURSE PRACTITIONER

## 2021-06-08 PROCEDURE — 86008 ALLG SPEC IGE RECOMB EA: CPT | Performed by: NURSE PRACTITIONER

## 2021-06-08 PROCEDURE — 84481 FREE ASSAY (FT-3): CPT | Performed by: NURSE PRACTITIONER

## 2021-06-08 PROCEDURE — 80061 LIPID PANEL: CPT | Performed by: NURSE PRACTITIONER

## 2021-06-10 ENCOUNTER — OFFICE VISIT (OUTPATIENT)
Dept: FAMILY MEDICINE CLINIC | Facility: CLINIC | Age: 26
End: 2021-06-10

## 2021-06-10 VITALS
RESPIRATION RATE: 16 BRPM | BODY MASS INDEX: 41.32 KG/M2 | OXYGEN SATURATION: 98 % | WEIGHT: 248 LBS | HEART RATE: 94 BPM | DIASTOLIC BLOOD PRESSURE: 90 MMHG | SYSTOLIC BLOOD PRESSURE: 144 MMHG | TEMPERATURE: 97.6 F | HEIGHT: 65 IN

## 2021-06-10 DIAGNOSIS — R21 RASH AND OTHER NONSPECIFIC SKIN ERUPTION: ICD-10-CM

## 2021-06-10 DIAGNOSIS — E66.01 CLASS 3 SEVERE OBESITY DUE TO EXCESS CALORIES WITHOUT SERIOUS COMORBIDITY WITH BODY MASS INDEX (BMI) OF 40.0 TO 44.9 IN ADULT (HCC): ICD-10-CM

## 2021-06-10 DIAGNOSIS — I10 ESSENTIAL HYPERTENSION: Primary | ICD-10-CM

## 2021-06-10 DIAGNOSIS — Z91.014 ALLERGY TO BEEF: ICD-10-CM

## 2021-06-10 DIAGNOSIS — Z91.018 ALLERGY TO ALPHA-GAL: ICD-10-CM

## 2021-06-10 PROBLEM — O99.212 OBESITY AFFECTING PREGNANCY IN SECOND TRIMESTER: Status: RESOLVED | Noted: 2020-07-23 | Resolved: 2021-06-10

## 2021-06-10 PROCEDURE — 99214 OFFICE O/P EST MOD 30 MIN: CPT | Performed by: NURSE PRACTITIONER

## 2021-06-10 RX ORDER — PHENTERMINE HYDROCHLORIDE 37.5 MG/1
37.5 TABLET ORAL
Qty: 30 TABLET | Refills: 0 | Status: CANCELLED | OUTPATIENT
Start: 2021-06-10

## 2021-06-10 RX ORDER — CLOTRIMAZOLE AND BETAMETHASONE DIPROPIONATE 10; .64 MG/G; MG/G
CREAM TOPICAL 2 TIMES DAILY
Qty: 45 G | Refills: 2 | Status: SHIPPED | OUTPATIENT
Start: 2021-06-10 | End: 2022-01-24

## 2021-06-10 RX ORDER — EPINEPHRINE 0.3 MG/.3ML
INJECTION SUBCUTANEOUS
Qty: 2 EACH | Refills: 1 | Status: SHIPPED | OUTPATIENT
Start: 2021-06-10

## 2021-06-10 RX ORDER — METOPROLOL SUCCINATE 25 MG/1
25 TABLET, EXTENDED RELEASE ORAL DAILY
Qty: 30 TABLET | Refills: 0 | Status: SHIPPED | OUTPATIENT
Start: 2021-06-10 | End: 2022-01-13 | Stop reason: SDUPTHER

## 2021-06-10 RX ORDER — METHYLPREDNISOLONE 4 MG/1
TABLET ORAL
Qty: 1 EACH | Refills: 0 | Status: SHIPPED | OUTPATIENT
Start: 2021-06-10 | End: 2021-07-19

## 2021-06-10 NOTE — PROGRESS NOTES
Subjective   Alka Albert is a 25 y.o. female.       Chief Complaint   Patient presents with   • Hypertension   • Weight Check        History of Present Illness     Patient presents for routine follow-up of hypertension.  Previously managed with labetalol, has been off her medications since early in her recent pregnancy without recurrence of hypertension.  Today blood pressure is above goal in office blood pressure is 144/90 with heart rate of 90.  Currently on no medication for hypertension.  Toprol 25 mg p.o. daily prescribed today    Patient presents for obesity desiring medically assisted weight loss with phentermine now that she is no longer pregnant.  She is bottlefeeding her infant.  Birth control accomplished by: use of condoms, consistently.  Body mass index is 41.27 kg/m².   Today is: 248 pounds.  Height today is: 5 foot 5 inches..    The patient is educated as to phentermine prescribing protocol for this office as follows:  Increase intake of water to 6-8 glasses per day.  Cut out concentrated sugars and reduce simple carbs.  Count calories, measure servings  Use a smart phone meg to help with counting calories and tracking weight loss, healthy lifestyle modifications.     Demonstrate at least a 1-2 pound / month weight loss with a healthy BP to continue on phentermine    Be aware that my routine practice is to allow qualified persons take phentermine for 3 months consecutively, then a one month holiday off it to prove the presence of lifestyle changes which are sustained, and may repeat this cycle as long as qualified and appropriate for phentermine.    If BMI falls below 27, the cycle will not continue    New rash on right lateral upper thigh. Is rough to touch, not particularly itchy, no open areas, but is similar in shape to ring worm, and she has tried antifungal creams with limited / short term resolution but it returns. Annoying in that it makes her self conscious of her legs in shorts.    Other  complaints today: None.    Parts of most recent relevant visit HPI, ROS  and PE may be carried forward and all are updated as appropriate for current situation.    Past Surgical History:   Procedure Laterality Date   • NO PAST SURGERIES        Social History     Socioeconomic History   • Marital status:      Spouse name: Not on file   • Number of children: Not on file   • Years of education: Not on file   • Highest education level: Not on file   Tobacco Use   • Smoking status: Never Smoker   • Smokeless tobacco: Never Used   Substance and Sexual Activity   • Alcohol use: No   • Drug use: No   • Sexual activity: Yes     Partners: Male     Birth control/protection: None      The following portions of the patient's history were reviewed and updated as appropriate: She  has a past medical history of IBS (irritable bowel syndrome), Infertility, female, PCOS (polycystic ovarian syndrome), and Seasonal allergies..    Review of Systems   Constitutional: Negative.    HENT: Negative.  Negative for congestion.    Eyes: Negative.  Negative for blurred vision, double vision, photophobia and visual disturbance.   Respiratory: Negative.  Negative for cough, shortness of breath, wheezing and stridor.    Cardiovascular: Negative.  Negative for chest pain, palpitations and leg swelling.   Gastrointestinal: Negative.  Negative for abdominal distention, abdominal pain, blood in stool, constipation, diarrhea, nausea, vomiting, GERD and indigestion.   Endocrine: Negative.  Negative for cold intolerance and heat intolerance.   Genitourinary: Negative.  Negative for dysuria, flank pain, frequency and urinary incontinence.   Musculoskeletal: Negative.  Negative for arthralgias and back pain.   Skin: Negative.    Allergic/Immunologic: Negative.  Negative for immunocompromised state.   Neurological: Negative.    Hematological: Negative.    Psychiatric/Behavioral: Negative.  Negative for agitation, behavioral problems, decreased  "concentration, dysphoric mood, hallucinations, self-injury, sleep disturbance, suicidal ideas, negative for hyperactivity, depressed mood and stress. The patient is not nervous/anxious.    All other systems reviewed and are negative.    PHQ-9 Depression Screening  Little interest or pleasure in doing things?     Feeling down, depressed, or hopeless?     Trouble falling or staying asleep, or sleeping too much?     Feeling tired or having little energy?     Poor appetite or overeating?     Feeling bad about yourself - or that you are a failure or have let yourself or your family down?     Trouble concentrating on things, such as reading the newspaper or watching television?     Moving or speaking so slowly that other people could have noticed? Or the opposite - being so fidgety or restless that you have been moving around a lot more than usual?     Thoughts that you would be better off dead, or of hurting yourself in some way?     PHQ-9 Total Score     If you checked off any problems, how difficult have these problems made it for you to do your work, take care of things at home, or get along with other people?        Objective    Vitals:    06/10/21 1129   BP: 144/90   BP Location: Left arm   Patient Position: Sitting   Cuff Size: Adult   Pulse: 94   Resp: 16   Temp: 97.6 °F (36.4 °C)   SpO2: 98%   Weight: 112 kg (248 lb)   Height: 165.1 cm (65\")   PainSc: 0-No pain       Physical Exam  Vitals and nursing note reviewed.   Constitutional:       General: She is not in acute distress.     Appearance: Normal appearance. She is well-developed. She is obese. She is not ill-appearing or diaphoretic.   HENT:      Head: Normocephalic and atraumatic.   Eyes:      General: No scleral icterus.        Right eye: No discharge.         Left eye: No discharge.      Conjunctiva/sclera: Conjunctivae normal.      Pupils: Pupils are equal, round, and reactive to light.   Neck:      Thyroid: No thyromegaly.      Vascular: No carotid bruit " or JVD.      Trachea: No tracheal deviation.   Cardiovascular:      Rate and Rhythm: Normal rate and regular rhythm.      Pulses: Normal pulses.      Heart sounds: Normal heart sounds. No murmur heard.   No friction rub. No gallop.    Pulmonary:      Effort: Pulmonary effort is normal. No respiratory distress.      Breath sounds: Normal breath sounds. No stridor. No wheezing, rhonchi or rales.   Chest:      Chest wall: No tenderness.   Abdominal:      General: Bowel sounds are normal.      Palpations: Abdomen is soft.   Musculoskeletal:         General: No tenderness or deformity. Normal range of motion.      Cervical back: Normal range of motion and neck supple. No rigidity or tenderness.      Right lower leg: No edema.      Left lower leg: No edema.   Lymphadenopathy:      Cervical: No cervical adenopathy.   Skin:     General: Skin is warm and dry.      Capillary Refill: Capillary refill takes 2 to 3 seconds.      Coloration: Skin is not jaundiced or pale.      Findings: No bruising, erythema, lesion or rash.   Neurological:      General: No focal deficit present.      Mental Status: She is alert and oriented to person, place, and time. Mental status is at baseline.      Motor: No weakness.      Gait: Gait normal.   Psychiatric:         Mood and Affect: Mood normal.         Behavior: Behavior normal.         Thought Content: Thought content normal.         Judgment: Judgment normal.     when blood pressure is controlled, will reissue phentermine.  To have nurse check blood pressure in office next week and we will adjust meds weekly until at goal, then send phentermine.  Follow up in 1 week if no resolution to rash.    Assessment/Plan   Diagnoses and all orders for this visit:    1. Essential hypertension (Primary)  -     metoprolol succinate XL (Toprol XL) 25 MG 24 hr tablet; Take 1 tablet by mouth Daily.  Dispense: 30 tablet; Refill: 0    2. Class 3 severe obesity due to excess calories without serious  comorbidity with body mass index (BMI) of 40.0 to 44.9 in adult (CMS/[facility])    3. Allergy to alpha-gal  -     EPINEPHrine (EpiPen 2-Anand) 0.3 MG/0.3ML solution auto-injector injection; Inject one syringe into the skin x 1 immediately if severe allergic reaction occurs.  Dispense: 2 each; Refill: 1    4. Allergy to beef  -     EPINEPHrine (EpiPen 2-Anand) 0.3 MG/0.3ML solution auto-injector injection; Inject one syringe into the skin x 1 immediately if severe allergic reaction occurs.  Dispense: 2 each; Refill: 1    5. Rash and other nonspecific skin eruption  -     methylPREDNISolone (MEDROL) 4 MG dose pack; Take as directed on package instructions.  Dispense: 1 each; Refill: 0  -     clotrimazole-betamethasone (Lotrisone) 1-0.05 % cream; Apply  topically to the appropriate area as directed 2 (Two) Times a Day. For Rash on thigh, until resolves, repeat for recurrence.  Dispense: 45 g; Refill: 2    Other orders  -     Cancel: phentermine (ADIPEX-P) 37.5 MG tablet; Take 1 tablet by mouth Every Morning Before Breakfast.  Dispense: 30 tablet; Refill: 0      Return in about 1 week (around 6/17/2021) for follow up hypertension..               This document has been electronically signed by KRYSTA Vizcaino on Sulma 10, 2021 12:19 CDT

## 2021-06-13 LAB
INSULIN FREE SERPL-ACNC: 17 UU/ML
INSULIN SERPL-ACNC: 17 UU/ML

## 2021-06-14 LAB
ALPHA-GAL IGE QN: 0.2 KU/L
BEEF IGE QN: 0.33 KU/L
DEPRECATED BEEF IGE RAST QL: ABNORMAL
DEPRECATED LAMB IGE RAST QL: ABNORMAL
DEPRECATED PORK IGE RAST QL: ABNORMAL
LAMB IGE QN: 0.33 KU/L
PORK IGE QN: 0.29 KU/L

## 2021-06-21 ENCOUNTER — TELEPHONE (OUTPATIENT)
Dept: FAMILY MEDICINE CLINIC | Facility: CLINIC | Age: 26
End: 2021-06-21

## 2021-06-21 NOTE — TELEPHONE ENCOUNTER
----- Message from KRYSTA Byers sent at 6/19/2021  2:01 PM CDT -----  I left her (you Alka) a voicemail. 130/80 is still too high to add phentermine, I want a log of bp twice daily for a week or so to see where it is most of the time and what her HR is then we will decide.   ----- Message -----  From: Michaelle Sun MA  Sent: 6/18/2021  11:08 AM CDT  To: KRYSTA Byers    Patient bp 130 80 if you send in phentermine sent to [facility]

## 2021-06-23 DIAGNOSIS — E66.01 CLASS 3 SEVERE OBESITY DUE TO EXCESS CALORIES WITHOUT SERIOUS COMORBIDITY WITH BODY MASS INDEX (BMI) OF 40.0 TO 44.9 IN ADULT (HCC): ICD-10-CM

## 2021-06-23 RX ORDER — PHENTERMINE HYDROCHLORIDE 37.5 MG/1
37.5 TABLET ORAL
Qty: 30 TABLET | Refills: 0 | Status: SHIPPED | OUTPATIENT
Start: 2021-06-23 | End: 2022-01-18 | Stop reason: SDUPTHER

## 2021-07-19 ENCOUNTER — OFFICE VISIT (OUTPATIENT)
Dept: FAMILY MEDICINE CLINIC | Facility: CLINIC | Age: 26
End: 2021-07-19

## 2021-07-19 VITALS
SYSTOLIC BLOOD PRESSURE: 130 MMHG | HEART RATE: 96 BPM | WEIGHT: 255 LBS | BODY MASS INDEX: 42.49 KG/M2 | RESPIRATION RATE: 16 BRPM | HEIGHT: 65 IN | TEMPERATURE: 97.6 F | OXYGEN SATURATION: 97 % | DIASTOLIC BLOOD PRESSURE: 82 MMHG

## 2021-07-19 DIAGNOSIS — I10 ESSENTIAL HYPERTENSION: Chronic | ICD-10-CM

## 2021-07-19 DIAGNOSIS — E66.01 MORBID OBESITY WITH BODY MASS INDEX (BMI) OF 40.0 OR HIGHER (HCC): Primary | ICD-10-CM

## 2021-07-19 DIAGNOSIS — E88.81 INSULIN RESISTANCE SYNDROME: Chronic | ICD-10-CM

## 2021-07-19 PROCEDURE — 99213 OFFICE O/P EST LOW 20 MIN: CPT | Performed by: NURSE PRACTITIONER

## 2021-07-19 NOTE — PATIENT INSTRUCTIONS
Bariatric Surgery Information  Bariatric surgery, also called weight loss surgery, is a procedure that helps you lose weight. You may consider, or your health care provider may suggest, bariatric surgery if:  · You are severely obese and have been unable to lose weight through diet and exercise.  · You have health problems related to obesity, such as:  ? Type 2 diabetes.  ? Heart disease.  ? Lung disease.  How does bariatric surgery help me lose weight?  Bariatric surgery helps you lose weight by:  · Decreasing how much food your body absorbs. This is done by closing off part of your stomach to make it smaller. This restricts the amount of food your stomach can hold.  · Changing your body's regular digestive process so that food bypasses the parts of your body that absorb calories and nutrients.  If you decide to have bariatric surgery, it is important to continue to eat a healthy diet and exercise regularly after the surgery.  What are the different kinds of bariatric surgery?  There are two kinds of bariatric surgeries:  · Restrictive surgery. This procedure makes your stomach smaller. It does not change your digestive process. The smaller the size of your new stomach, the less food you can eat. There are different types of restrictive surgeries.  · Malabsorptive surgery. This procedure makes your stomach smaller and alters your digestive process so that your body processes less calories and nutrients. These are the most common kind of bariatric surgery. There are different types of malabsorptive surgeries.  What are the different types of restrictive surgery?  Adjustable Gastric Banding  In this procedure, an inflatable band is placed around your stomach near the upper end. This makes the passageway for food into the rest of your stomach much smaller. The band can be adjusted, making it tighter or looser, by filling it with salt solution. Your surgeon can adjust the band based on how you are feeling and how much  weight you are losing. The band can be removed in the future. This requires another surgery.  Sleeve Gastrectomy  In this procedure, your stomach is made smaller. This is done by surgically removing a large part of your stomach. When your stomach is smaller, you feel full more quickly and reduce how much you eat.  What are the different types of malabsorptive surgery?    Peyton-en-Y Gastric Bypass (RGB)  This is the most common weight loss surgery. In this procedure, a small stomach pouch (gastric pouch) is created in the upper part of your stomach. Next, this gastric pouch is attached directly to the middle part of your small intestine. The farther down your small intestine the new connection is made, the fewer calories and nutrients you will absorb. This surgery has the highest rate of complications.  Biliopancreatic Diversion with Duodenal Switch (BPD/DS)  This is a multi-step procedure. First, a large part of your stomach is removed, making your stomach smaller. Next, this smaller stomach is attached to the lower part of your small intestine. Like the RGB surgery, you absorb fewer calories and nutrients the farther down your small intestine the attachment is made.  What are the risks of bariatric surgery?  As with any surgical procedure, each type of bariatric surgery has its own risks. These risks also depend on your age, your overall health, and any other medical conditions you may have. When deciding on bariatric surgery, it is very important to:  · Talk to your health care provider and choose the surgery that is best for you.  · Ask your health care provider about specific risks for the surgery you choose.  Generally, the risks of bariatric surgery include:  · Infection.  · Bleeding.  · Not getting enough nutrients from food (nutritional deficiencies).  · Failure of the device or procedure. This may require another surgery to correct the problem.  Where to find more information  · American Society for  Metabolic & Bariatric Surgery: www.asmbs.org  · Weight-control Information Network (WIN): win.niddk.nih.gov  Summary  · Bariatric surgery, also called weight loss surgery, is a procedure that helps you lose weight.  · This surgery may be recommended if you have diabetes, heart disease, or lung disease.  · Generally, risks of bariatric surgery include infection, bleeding, and failure of the surgery or device, which may require another surgery to correct the problem.  This information is not intended to replace advice given to you by your health care provider. Make sure you discuss any questions you have with your health care provider.  Document Revised: 04/07/2020 Document Reviewed: 01/22/2018  [employer] Patient Education © 2021 [employer]

## 2021-07-19 NOTE — PROGRESS NOTES
Subjective   Alka Albert is a 26 y.o. female.       Chief Complaint   Patient presents with   • Weight Check        History of Present Illness     Patient presents for routine follow up of obesity, Body mass index is 42.43 kg/m².  For medically supervised weight loss monitoring. She has tried and failed multiple lower calorie and keto oriented diets with no significant amount of weight loss noted. She has previously tried phentermine with some noted weight loss, but never sustained and significant in amount. She has comorbid insulin resistance and hypertension, which are worsened by obesity.   She has been worked up in the past for bariatic surgery and did not follow through after multiple visits, not sure why. However, she has renewed interest in obtaining and maintaining better health, including a healthy BMI and reports she is intending to intiiate proceedings for bariatric surgery again with Dr Mera in [place].    HTN mildly elevated today in office at 130/82 with HR 96, on Toprol XL 25mg daily. Her HTN is negatively impacted by obesity as well.     Behavioral changes planned: put down fork between bites of food. Only eat sitting at the table. Drink a full glass of water in the hour prior to meals. Only eat the food portioned out on your plate ahead of time, no seconds. Leave something on your plate at each meal.   Dietary changes planned: limit calories to at least 1200 kcal/ day, not more than 150 kcal per day. No sugary drinks. Eliminate sodas of all types. Measure foods and plate servings with suggested serving sizes followed.     Activity level changes planned: walk 30 minutes 5 days weekly at brisk rate, fast enough that sustained conversation is a challenge.    No other complaints today.    Parts of most recent relevant visit HPI, ROS  and PE may be carried forward and all are updated as appropriate for current situation.      Past Surgical History:   Procedure Laterality Date   • NO PAST SURGERIES         Social History     Socioeconomic History   • Marital status:      Spouse name: Not on file   • Number of children: Not on file   • Years of education: Not on file   • Highest education level: Not on file   Tobacco Use   • Smoking status: Never Smoker   • Smokeless tobacco: Never Used   Substance and Sexual Activity   • Alcohol use: No   • Drug use: No   • Sexual activity: Yes     Partners: Male     Birth control/protection: None      The following portions of the patient's history were reviewed and updated as appropriate: She  has a past medical history of IBS (irritable bowel syndrome), Infertility, female, PCOS (polycystic ovarian syndrome), and Seasonal allergies..    Review of Systems   Constitutional: Positive for unexpected weight gain.   HENT: Negative.  Negative for congestion.    Eyes: Negative.  Negative for blurred vision, double vision, photophobia and visual disturbance.   Respiratory: Negative.  Negative for cough, shortness of breath, wheezing and stridor.    Cardiovascular: Negative.  Negative for chest pain, palpitations and leg swelling.   Gastrointestinal: Negative.  Negative for abdominal distention, abdominal pain, blood in stool, constipation, diarrhea, nausea, vomiting, GERD and indigestion.   Endocrine: Negative.  Negative for cold intolerance and heat intolerance.   Genitourinary: Negative.  Negative for dysuria, flank pain, frequency and urinary incontinence.   Musculoskeletal: Negative.  Negative for arthralgias and back pain.   Skin: Negative.    Allergic/Immunologic: Negative.  Negative for immunocompromised state.   Neurological: Negative.    Hematological: Negative.    Psychiatric/Behavioral: Negative.  Negative for agitation, behavioral problems, decreased concentration, dysphoric mood, hallucinations, self-injury, sleep disturbance, suicidal ideas, negative for hyperactivity, depressed mood and stress. The patient is not nervous/anxious.    All other systems reviewed and  "are negative.    PHQ-9 Depression Screening  Little interest or pleasure in doing things?     Feeling down, depressed, or hopeless?     Trouble falling or staying asleep, or sleeping too much?     Feeling tired or having little energy?     Poor appetite or overeating?     Feeling bad about yourself - or that you are a failure or have let yourself or your family down?     Trouble concentrating on things, such as reading the newspaper or watching television?     Moving or speaking so slowly that other people could have noticed? Or the opposite - being so fidgety or restless that you have been moving around a lot more than usual?     Thoughts that you would be better off dead, or of hurting yourself in some way?     PHQ-9 Total Score     If you checked off any problems, how difficult have these problems made it for you to do your work, take care of things at home, or get along with other people?        Objective    Vitals:    07/19/21 1133   BP: 130/82   BP Location: Left arm   Patient Position: Sitting   Cuff Size: Adult   Pulse: 96   Resp: 16   Temp: 97.6 °F (36.4 °C)   SpO2: 97%   Weight: 116 kg (255 lb)   Height: 165.1 cm (65\")   PainSc: 0-No pain     Body mass index is 42.43 kg/m².    Physical Exam  Vitals and nursing note reviewed.   Constitutional:       General: She is not in acute distress.     Appearance: Normal appearance. She is well-developed. She is obese. She is not ill-appearing or diaphoretic.   HENT:      Head: Normocephalic and atraumatic.   Eyes:      General: No scleral icterus.        Right eye: No discharge.         Left eye: No discharge.      Conjunctiva/sclera: Conjunctivae normal.      Pupils: Pupils are equal, round, and reactive to light.   Neck:      Thyroid: No thyromegaly.      Vascular: No carotid bruit or JVD.      Trachea: No tracheal deviation.   Cardiovascular:      Rate and Rhythm: Normal rate and regular rhythm.      Pulses: Normal pulses.      Heart sounds: Normal heart sounds. " No murmur heard.   No friction rub. No gallop.    Pulmonary:      Effort: Pulmonary effort is normal. No respiratory distress.      Breath sounds: Normal breath sounds. No stridor. No wheezing, rhonchi or rales.   Chest:      Chest wall: No tenderness.   Abdominal:      General: Bowel sounds are normal.      Palpations: Abdomen is soft.   Musculoskeletal:         General: No tenderness or deformity. Normal range of motion.      Cervical back: Normal range of motion and neck supple. No rigidity or tenderness.      Right lower leg: No edema.      Left lower leg: No edema.   Lymphadenopathy:      Cervical: No cervical adenopathy.   Skin:     General: Skin is warm and dry.      Capillary Refill: Capillary refill takes 2 to 3 seconds.      Coloration: Skin is not jaundiced or pale.      Findings: No bruising, erythema, lesion or rash.   Neurological:      General: No focal deficit present.      Mental Status: She is alert and oriented to person, place, and time. Mental status is at baseline.      Motor: No weakness.      Gait: Gait normal.   Psychiatric:         Mood and Affect: Mood normal.         Behavior: Behavior normal.         Thought Content: Thought content normal.         Judgment: Judgment normal.       Gradually worsening morbid obesity with Body mass index is 42.43 kg/m².  Comorbid insulin resistance and HTN. She has self referred to bariatric surgeon in [place], Dr Mera and is planning on gastric sleeve surgery.   Assessment/Plan   Diagnoses and all orders for this visit:    1. Morbid obesity with body mass index (BMI) of 40.0 or higher (CMS/[facility]) (Primary)    2. Essential hypertension    3. Insulin resistance syndrome      Return in about 1 month (around 8/19/2021).             This document has been electronically signed by KRYSTA Vizcaino on July 19, 2021 12:28 CDT

## 2021-08-10 ENCOUNTER — LAB (OUTPATIENT)
Dept: LAB | Facility: OTHER | Age: 26
End: 2021-08-10

## 2021-08-10 ENCOUNTER — OFFICE VISIT (OUTPATIENT)
Dept: FAMILY MEDICINE CLINIC | Facility: CLINIC | Age: 26
End: 2021-08-10

## 2021-08-10 VITALS
DIASTOLIC BLOOD PRESSURE: 86 MMHG | WEIGHT: 250 LBS | SYSTOLIC BLOOD PRESSURE: 128 MMHG | BODY MASS INDEX: 39.24 KG/M2 | OXYGEN SATURATION: 97 % | RESPIRATION RATE: 16 BRPM | HEART RATE: 110 BPM | HEIGHT: 67 IN | TEMPERATURE: 99.6 F

## 2021-08-10 DIAGNOSIS — J34.89 SINUS DRAINAGE: ICD-10-CM

## 2021-08-10 DIAGNOSIS — R09.81 NASAL CONGESTION: ICD-10-CM

## 2021-08-10 DIAGNOSIS — R05.9 COUGH: Primary | ICD-10-CM

## 2021-08-10 DIAGNOSIS — J06.9 ACUTE URI: ICD-10-CM

## 2021-08-10 DIAGNOSIS — R50.9 LOW GRADE FEVER: ICD-10-CM

## 2021-08-10 DIAGNOSIS — J02.9 SORE THROAT: ICD-10-CM

## 2021-08-10 DIAGNOSIS — R11.0 NAUSEA: ICD-10-CM

## 2021-08-10 PROCEDURE — 87635 SARS-COV-2 COVID-19 AMP PRB: CPT | Performed by: NURSE PRACTITIONER

## 2021-08-10 PROCEDURE — 99213 OFFICE O/P EST LOW 20 MIN: CPT | Performed by: NURSE PRACTITIONER

## 2021-08-10 RX ORDER — AZITHROMYCIN 250 MG/1
TABLET, FILM COATED ORAL
Qty: 6 TABLET | Refills: 0 | Status: SHIPPED | OUTPATIENT
Start: 2021-08-10 | End: 2021-10-03

## 2021-08-10 RX ORDER — PREDNISONE 20 MG/1
20 TABLET ORAL 2 TIMES DAILY
Qty: 14 TABLET | Refills: 0 | Status: SHIPPED | OUTPATIENT
Start: 2021-08-10 | End: 2021-08-17

## 2021-08-10 RX ORDER — ONDANSETRON 4 MG/1
4 TABLET, ORALLY DISINTEGRATING ORAL EVERY 8 HOURS PRN
Qty: 30 TABLET | Refills: 5 | Status: SHIPPED | OUTPATIENT
Start: 2021-08-10 | End: 2022-01-13 | Stop reason: SDUPTHER

## 2021-08-10 NOTE — PROGRESS NOTES
Subjective   Alka Albert is a 26 y.o. female.       Chief Complaint   Patient presents with   • Cough   • Fever        History of Present Illness     less than 24 hour history of low grade temp, malaise, sore throat, cough, postnasal drainage, congested nose. No known or suspected covid contact, requests covid testing today. She had no fever when she came to work, began feeling bad and noted temp 99.6F.  recommended visit with covid testing.   No other complaints  Parts of most recent relevant visit HPI, ROS  and PE may be carried forward and all are updated as appropriate for current situation.    Past Surgical History:   Procedure Laterality Date   • NO PAST SURGERIES        Social History     Socioeconomic History   • Marital status:      Spouse name: Not on file   • Number of children: Not on file   • Years of education: Not on file   • Highest education level: Not on file   Tobacco Use   • Smoking status: Never Smoker   • Smokeless tobacco: Never Used   Substance and Sexual Activity   • Alcohol use: No   • Drug use: No   • Sexual activity: Yes     Partners: Male     Birth control/protection: None      The following portions of the patient's history were reviewed and updated as appropriate: She  has a past medical history of IBS (irritable bowel syndrome), Infertility, female, PCOS (polycystic ovarian syndrome), and Seasonal allergies..    Review of Systems   Constitutional: Positive for fever. Negative for unexpected weight gain.   HENT: Positive for postnasal drip, sinus pressure and sore throat. Negative for congestion.    Eyes: Negative.  Negative for blurred vision, double vision, photophobia and visual disturbance.   Respiratory: Positive for cough. Negative for shortness of breath, wheezing and stridor.    Cardiovascular: Negative.  Negative for chest pain, palpitations and leg swelling.   Gastrointestinal: Negative.  Negative for abdominal distention, abdominal pain, blood in  "stool, constipation, diarrhea, nausea, vomiting, GERD and indigestion.   Endocrine: Negative.  Negative for cold intolerance and heat intolerance.   Genitourinary: Negative.  Negative for dysuria, flank pain, frequency and urinary incontinence.   Musculoskeletal: Negative.  Negative for arthralgias and back pain.   Skin: Negative.    Allergic/Immunologic: Negative.  Negative for immunocompromised state.   Neurological: Negative.    Hematological: Negative.    Psychiatric/Behavioral: Negative.  Negative for agitation, behavioral problems, decreased concentration, dysphoric mood, hallucinations, self-injury, sleep disturbance, suicidal ideas, negative for hyperactivity, depressed mood and stress. The patient is not nervous/anxious.    All other systems reviewed and are negative.    PHQ-9 Depression Screening  Little interest or pleasure in doing things?     Feeling down, depressed, or hopeless?     Trouble falling or staying asleep, or sleeping too much?     Feeling tired or having little energy?     Poor appetite or overeating?     Feeling bad about yourself - or that you are a failure or have let yourself or your family down?     Trouble concentrating on things, such as reading the newspaper or watching television?     Moving or speaking so slowly that other people could have noticed? Or the opposite - being so fidgety or restless that you have been moving around a lot more than usual?     Thoughts that you would be better off dead, or of hurting yourself in some way?     PHQ-9 Total Score     If you checked off any problems, how difficult have these problems made it for you to do your work, take care of things at home, or get along with other people?        Objective    Vitals:    08/10/21 0927   BP: 128/86   BP Location: Left arm   Patient Position: Sitting   Cuff Size: Adult   Pulse: 110   Resp: 16   Temp: 99.6 °F (37.6 °C)   SpO2: 97%   Weight: 113 kg (250 lb)   Height: 170.2 cm (67\")   PainSc: 0-No pain "     Body mass index is 39.16 kg/m².    Physical Exam  Vitals and nursing note reviewed.   Constitutional:       General: She is not in acute distress.     Appearance: Normal appearance. She is well-developed. She is obese. She is not ill-appearing or diaphoretic.   HENT:      Head: Normocephalic and atraumatic.      Right Ear: Hearing, tympanic membrane, ear canal and external ear normal.      Left Ear: Hearing, tympanic membrane, ear canal and external ear normal.      Nose: Congestion present.      Right Sinus: No maxillary sinus tenderness or frontal sinus tenderness.      Left Sinus: No maxillary sinus tenderness or frontal sinus tenderness.      Mouth/Throat:      Lips: Pink.      Mouth: Mucous membranes are moist.      Tongue: No lesions.      Pharynx: Posterior oropharyngeal erythema present.   Eyes:      General: No scleral icterus.        Right eye: No discharge.         Left eye: No discharge.      Conjunctiva/sclera: Conjunctivae normal.      Pupils: Pupils are equal, round, and reactive to light.   Neck:      Thyroid: No thyromegaly.      Vascular: No carotid bruit or JVD.      Trachea: No tracheal deviation.   Cardiovascular:      Rate and Rhythm: Normal rate and regular rhythm.      Pulses: Normal pulses.      Heart sounds: Normal heart sounds. No murmur heard.   No friction rub. No gallop.    Pulmonary:      Effort: Pulmonary effort is normal. No respiratory distress.      Breath sounds: Normal breath sounds. No stridor. No wheezing, rhonchi or rales.   Chest:      Chest wall: No tenderness.   Abdominal:      General: Bowel sounds are normal.      Palpations: Abdomen is soft.   Musculoskeletal:         General: No tenderness or deformity. Normal range of motion.      Cervical back: Normal range of motion and neck supple. No rigidity or tenderness.      Right lower leg: No edema.      Left lower leg: No edema.   Lymphadenopathy:      Cervical: No cervical adenopathy.   Skin:     General: Skin is warm  and dry.      Capillary Refill: Capillary refill takes 2 to 3 seconds.      Coloration: Skin is not jaundiced or pale.      Findings: No bruising, erythema, lesion or rash.   Neurological:      General: No focal deficit present.      Mental Status: She is alert and oriented to person, place, and time. Mental status is at baseline.      Motor: No weakness.      Gait: Gait normal.   Psychiatric:         Mood and Affect: Mood normal.         Behavior: Behavior normal.         Thought Content: Thought content normal.         Judgment: Judgment normal.     less than 24 hour history of low grade temp, malaise, sore throat, cough, postnasal drainage, congested nose. No known or suspected covid contact, requests covid testing today.     Assessment/Plan   Diagnoses and all orders for this visit:    1. Cough (Primary)  -     COVID-19,[facility], NP SWAB IN TRANSPORT MEDIA 48-72 HR TAT - Swab, Nasopharynx  -     azithromycin (ZITHROMAX) 250 MG tablet; Take 2 tablets the first day, then 1 tablet daily for 4 days.  Dispense: 6 tablet; Refill: 0  -     predniSONE (DELTASONE) 20 MG tablet; Take 1 tablet by mouth 2 (Two) Times a Day for 7 days.  Dispense: 14 tablet; Refill: 0    2. Low grade fever  -     COVID-19,[facility], NP SWAB IN TRANSPORT MEDIA 48-72 HR TAT - Swab, Nasopharynx  -     azithromycin (ZITHROMAX) 250 MG tablet; Take 2 tablets the first day, then 1 tablet daily for 4 days.  Dispense: 6 tablet; Refill: 0  -     predniSONE (DELTASONE) 20 MG tablet; Take 1 tablet by mouth 2 (Two) Times a Day for 7 days.  Dispense: 14 tablet; Refill: 0    3. Sore throat  -     COVID-19,[facility], NP SWAB IN TRANSPORT MEDIA 48-72 HR TAT - Swab, Nasopharynx  -     azithromycin (ZITHROMAX) 250 MG tablet; Take 2 tablets the first day, then 1 tablet daily for 4 days.  Dispense: 6 tablet; Refill: 0  -     predniSONE (DELTASONE) 20 MG tablet; Take 1 tablet by mouth 2 (Two) Times a Day for 7 days.  Dispense: 14  tablet; Refill: 0    4. Sinus drainage  -     COVID-19,[facility], NP SWAB IN TRANSPORT MEDIA 48-72 HR TAT - Swab, Nasopharynx  -     azithromycin (ZITHROMAX) 250 MG tablet; Take 2 tablets the first day, then 1 tablet daily for 4 days.  Dispense: 6 tablet; Refill: 0  -     predniSONE (DELTASONE) 20 MG tablet; Take 1 tablet by mouth 2 (Two) Times a Day for 7 days.  Dispense: 14 tablet; Refill: 0    5. Nasal congestion  -     COVID-19,[facility], NP SWAB IN TRANSPORT MEDIA 48-72 HR TAT - Swab, Nasopharynx  -     azithromycin (ZITHROMAX) 250 MG tablet; Take 2 tablets the first day, then 1 tablet daily for 4 days.  Dispense: 6 tablet; Refill: 0  -     predniSONE (DELTASONE) 20 MG tablet; Take 1 tablet by mouth 2 (Two) Times a Day for 7 days.  Dispense: 14 tablet; Refill: 0    6. Nausea  -     ondansetron ODT (Zofran ODT) 4 MG disintegrating tablet; Place 1 tablet on the tongue Every 8 (Eight) Hours As Needed for Nausea or Vomiting.  Dispense: 30 tablet; Refill: 5    7. Acute URI  -     azithromycin (ZITHROMAX) 250 MG tablet; Take 2 tablets the first day, then 1 tablet daily for 4 days.  Dispense: 6 tablet; Refill: 0  -     predniSONE (DELTASONE) 20 MG tablet; Take 1 tablet by mouth 2 (Two) Times a Day for 7 days.  Dispense: 14 tablet; Refill: 0      Return in about 3 days (around 8/13/2021) for Video visit.               This document has been electronically signed by KRYSTA Vizcaino on August 10, 2021 10:09 CDT

## 2021-08-11 LAB — SARS-COV-2 N GENE RESP QL NAA+PROBE: DETECTED

## 2021-08-31 ENCOUNTER — TELEPHONE (OUTPATIENT)
Dept: BARIATRICS/WEIGHT MGMT | Facility: CLINIC | Age: 26
End: 2021-08-31

## 2021-10-03 ENCOUNTER — DOCUMENTATION (OUTPATIENT)
Dept: FAMILY MEDICINE CLINIC | Facility: CLINIC | Age: 26
End: 2021-10-03

## 2021-10-03 RX ORDER — CIPROFLOXACIN 500 MG/1
500 TABLET, FILM COATED ORAL 2 TIMES DAILY
Qty: 14 TABLET | Refills: 0 | Status: SHIPPED | OUTPATIENT
Start: 2021-10-03 | End: 2021-10-10

## 2021-12-13 RX ORDER — AZITHROMYCIN 250 MG/1
TABLET, FILM COATED ORAL
Qty: 6 TABLET | Refills: 0 | Status: SHIPPED | OUTPATIENT
Start: 2021-12-13 | End: 2022-01-24

## 2022-01-13 DIAGNOSIS — R11.0 NAUSEA: ICD-10-CM

## 2022-01-13 DIAGNOSIS — I10 ESSENTIAL HYPERTENSION: ICD-10-CM

## 2022-01-13 RX ORDER — DIPHENHYDRAMINE HCL 25 MG
25 TABLET ORAL EVERY 6 HOURS PRN
Qty: 30 TABLET | Refills: 5 | Status: SHIPPED | OUTPATIENT
Start: 2022-01-13

## 2022-01-13 RX ORDER — ONDANSETRON 4 MG/1
4 TABLET, ORALLY DISINTEGRATING ORAL EVERY 8 HOURS PRN
Qty: 30 TABLET | Refills: 5 | Status: SHIPPED | OUTPATIENT
Start: 2022-01-13 | End: 2022-03-24 | Stop reason: SDUPTHER

## 2022-01-13 RX ORDER — METOPROLOL SUCCINATE 25 MG/1
25 TABLET, EXTENDED RELEASE ORAL DAILY
Qty: 30 TABLET | Refills: 5 | Status: SHIPPED | OUTPATIENT
Start: 2022-01-13

## 2022-01-18 ENCOUNTER — OFFICE VISIT (OUTPATIENT)
Dept: FAMILY MEDICINE CLINIC | Facility: CLINIC | Age: 27
End: 2022-01-18

## 2022-01-18 VITALS
SYSTOLIC BLOOD PRESSURE: 126 MMHG | WEIGHT: 269 LBS | TEMPERATURE: 97.4 F | DIASTOLIC BLOOD PRESSURE: 74 MMHG | OXYGEN SATURATION: 98 % | HEART RATE: 80 BPM | HEIGHT: 67 IN | BODY MASS INDEX: 42.22 KG/M2 | RESPIRATION RATE: 16 BRPM

## 2022-01-18 DIAGNOSIS — E66.01 CLASS 3 SEVERE OBESITY DUE TO EXCESS CALORIES WITHOUT SERIOUS COMORBIDITY WITH BODY MASS INDEX (BMI) OF 40.0 TO 44.9 IN ADULT: Chronic | ICD-10-CM

## 2022-01-18 PROCEDURE — 99214 OFFICE O/P EST MOD 30 MIN: CPT | Performed by: NURSE PRACTITIONER

## 2022-01-18 RX ORDER — PHENTERMINE HYDROCHLORIDE 37.5 MG/1
37.5 TABLET ORAL
Qty: 30 TABLET | Refills: 0 | Status: SHIPPED | OUTPATIENT
Start: 2022-01-18 | End: 2022-02-28 | Stop reason: SDUPTHER

## 2022-01-18 NOTE — PROGRESS NOTES
Subjective   Alka Albert is a 26 y.o. female.       Chief Complaint   Patient presents with   • Weight Check        Obesity  This is a chronic problem. The current episode started more than 1 year ago. The problem occurs constantly. The problem has been gradually worsening. Pertinent negatives include no abdominal pain, arthralgias, chest pain, congestion, coughing, nausea or vomiting. The symptoms are aggravated by eating. She has tried walking (dieting) for the symptoms. The treatment provided mild relief.      Patient presents for assistance with weight loss, has tried and benefited from phentermine in the past, would like to resume. Most recent refill on 6/24/21.     Walk 30 minutes briskly daily to avoid adverse effects of obesity/ overweight status.  Increase intake of water to 6-8 glasses per day.  Cut out concentrated sugars and reduce simple carbs.  Count calories, measure servings  Use a smart phone meg to help with counting calories and tracking weight loss, healthy lifestyle modifications.     Demonstrate at least a 1-2 pound / month weight loss with a healthy BP to continue on phentermine    Be aware that my routine practice is to allow qualified persons take phentermine for 3 months consecutively, then a one month holiday off it to prove the presence of lifestyle changes which are sustained, and may repeat this cycle as long as qualified and appropriate for phentermine.    If BMI falls below 27, the cycle will not continue    Past Surgical History:   Procedure Laterality Date   • NO PAST SURGERIES        Social History     Socioeconomic History   • Marital status:    Tobacco Use   • Smoking status: Never Smoker   • Smokeless tobacco: Never Used   Substance and Sexual Activity   • Alcohol use: No   • Drug use: No   • Sexual activity: Yes     Partners: Male     Birth control/protection: None      The following portions of the patient's history were reviewed and updated as appropriate:  allergies, current medications, past family history, past medical history, past social history, past surgical history and problem list.    Review of Systems   Constitutional: Positive for unexpected weight gain.   HENT: Negative.  Negative for congestion.    Eyes: Negative.  Negative for blurred vision, double vision, photophobia and visual disturbance.   Respiratory: Negative.  Negative for cough, shortness of breath, wheezing and stridor.    Cardiovascular: Negative.  Negative for chest pain, palpitations and leg swelling.   Gastrointestinal: Negative.  Negative for abdominal distention, abdominal pain, blood in stool, constipation, diarrhea, nausea, vomiting, GERD and indigestion.   Endocrine: Negative.  Negative for cold intolerance and heat intolerance.   Genitourinary: Negative.  Negative for dysuria, flank pain, frequency and urinary incontinence.   Musculoskeletal: Negative.  Negative for arthralgias and back pain.   Skin: Negative.    Allergic/Immunologic: Negative.  Negative for immunocompromised state.   Neurological: Negative.    Hematological: Negative.    Psychiatric/Behavioral: Negative.  Negative for agitation, behavioral problems, decreased concentration, dysphoric mood, hallucinations, self-injury, sleep disturbance, suicidal ideas, negative for hyperactivity, depressed mood and stress. The patient is not nervous/anxious.    All other systems reviewed and are negative.    PHQ-9 Depression Screening  Little interest or pleasure in doing things? 0   Feeling down, depressed, or hopeless? 0   Trouble falling or staying asleep, or sleeping too much?     Feeling tired or having little energy?     Poor appetite or overeating?     Feeling bad about yourself - or that you are a failure or have let yourself or your family down?     Trouble concentrating on things, such as reading the newspaper or watching television?     Moving or speaking so slowly that other people could have noticed? Or the opposite -  "being so fidgety or restless that you have been moving around a lot more than usual?     Thoughts that you would be better off dead, or of hurting yourself in some way?     PHQ-9 Total Score 0   If you checked off any problems, how difficult have these problems made it for you to do your work, take care of things at home, or get along with other people?      Patient understands the risks associated with this controlled medication, including tolerance and addiction.  Patient also agrees to only obtain this medication from me, and not from a another provider, unless that provider is covering for me in my absence.  Patient also agrees to be compliant in dosing, and not self adjust the dose of medication.  A signed controlled substance agreement is on file, and the patient has received a controlled substance education sheet at this a previous visit.  The patient has also signed a consent for treatment with a controlled substance as per [facility] policy. RA was obtained.    Objective    Vitals:    01/18/22 0812   BP: 126/74   BP Location: Left arm   Patient Position: Sitting   Cuff Size: Adult   Pulse: 80   Resp: 16   Temp: 97.4 °F (36.3 °C)   SpO2: 98%   Weight: 122 kg (269 lb)   Height: 170.2 cm (67\")   PainSc: 0-No pain     Body mass index is 42.13 kg/m².    Physical Exam  Vitals and nursing note reviewed.   Constitutional:       General: She is not in acute distress.     Appearance: Normal appearance. She is well-developed. She is obese. She is not ill-appearing or diaphoretic.   HENT:      Head: Normocephalic and atraumatic.   Eyes:      General: No scleral icterus.        Right eye: No discharge.         Left eye: No discharge.      Conjunctiva/sclera: Conjunctivae normal.      Pupils: Pupils are equal, round, and reactive to light.   Neck:      Thyroid: No thyromegaly.      Vascular: No carotid bruit or JVD.      Trachea: No tracheal deviation.   Cardiovascular:      Rate and Rhythm: Normal rate and " regular rhythm.      Pulses: Normal pulses.      Heart sounds: Normal heart sounds. No murmur heard.  No friction rub. No gallop.    Pulmonary:      Effort: Pulmonary effort is normal. No respiratory distress.      Breath sounds: Normal breath sounds. No stridor. No wheezing, rhonchi or rales.   Chest:      Chest wall: No tenderness.   Abdominal:      General: Bowel sounds are normal.      Palpations: Abdomen is soft.   Musculoskeletal:         General: No tenderness or deformity. Normal range of motion.      Cervical back: Normal range of motion and neck supple. No rigidity or tenderness.      Right lower leg: No edema.      Left lower leg: No edema.   Lymphadenopathy:      Cervical: No cervical adenopathy.   Skin:     General: Skin is warm and dry.      Capillary Refill: Capillary refill takes 2 to 3 seconds.      Coloration: Skin is not jaundiced or pale.      Findings: No bruising, erythema, lesion or rash.   Neurological:      General: No focal deficit present.      Mental Status: She is alert and oriented to person, place, and time. Mental status is at baseline.      Motor: No weakness.      Gait: Gait normal.   Psychiatric:         Mood and Affect: Mood normal.         Behavior: Behavior normal.         Thought Content: Thought content normal.         Judgment: Judgment normal.           Assessment/Plan   Diagnoses and all orders for this visit:    1. Class 3 severe obesity due to excess calories without serious comorbidity with body mass index (BMI) of 40.0 to 44.9 in adult (HCC)  Comments:  Worsening over holidays with Covid pandemic reduction in activities and without phentermine.  Wants to resume phentermine which is deemed appropriate at this ti  Orders:  -     phentermine (ADIPEX-P) 37.5 MG tablet; Take 1 tablet by mouth Every Morning Before Breakfast.  Dispense: 30 tablet; Refill: 0      Return in about 4 weeks (around 2/15/2022).               This document has been electronically signed by Luz Elena ORTIZ  Jacques, APRN on January 24, 2022 18:50 CST

## 2022-02-07 DIAGNOSIS — M79.672 CHRONIC PAIN IN LEFT FOOT: Primary | ICD-10-CM

## 2022-02-07 DIAGNOSIS — G89.29 CHRONIC PAIN IN LEFT FOOT: Primary | ICD-10-CM

## 2022-02-28 ENCOUNTER — OFFICE VISIT (OUTPATIENT)
Dept: FAMILY MEDICINE CLINIC | Facility: CLINIC | Age: 27
End: 2022-02-28

## 2022-02-28 VITALS
DIASTOLIC BLOOD PRESSURE: 78 MMHG | TEMPERATURE: 97.2 F | WEIGHT: 264 LBS | HEART RATE: 84 BPM | RESPIRATION RATE: 18 BRPM | HEIGHT: 67 IN | OXYGEN SATURATION: 99 % | SYSTOLIC BLOOD PRESSURE: 124 MMHG | BODY MASS INDEX: 41.44 KG/M2

## 2022-02-28 DIAGNOSIS — E66.01 CLASS 3 SEVERE OBESITY DUE TO EXCESS CALORIES WITHOUT SERIOUS COMORBIDITY WITH BODY MASS INDEX (BMI) OF 40.0 TO 44.9 IN ADULT: Chronic | ICD-10-CM

## 2022-02-28 DIAGNOSIS — F33.0 MDD (MAJOR DEPRESSIVE DISORDER), RECURRENT EPISODE, MILD: Chronic | ICD-10-CM

## 2022-02-28 DIAGNOSIS — Z30.011 ENCOUNTER FOR ORAL CONTRACEPTION INITIAL PRESCRIPTION: Primary | ICD-10-CM

## 2022-02-28 PROCEDURE — 99214 OFFICE O/P EST MOD 30 MIN: CPT | Performed by: NURSE PRACTITIONER

## 2022-02-28 RX ORDER — NORGESTREL-ETHINYL ESTRADIOL 0.3-0.03MG
1 TABLET ORAL DAILY
Qty: 28 TABLET | Refills: 5 | Status: SHIPPED | OUTPATIENT
Start: 2022-02-28 | End: 2022-03-24

## 2022-02-28 RX ORDER — PHENTERMINE HYDROCHLORIDE 37.5 MG/1
37.5 TABLET ORAL
Qty: 30 TABLET | Refills: 0 | Status: SHIPPED | OUTPATIENT
Start: 2022-02-28 | End: 2022-06-28

## 2022-02-28 RX ORDER — VENLAFAXINE HYDROCHLORIDE 37.5 MG/1
37.5 CAPSULE, EXTENDED RELEASE ORAL DAILY
Qty: 30 CAPSULE | Refills: 0 | Status: SHIPPED | OUTPATIENT
Start: 2022-02-28 | End: 2022-03-09

## 2022-02-28 NOTE — PROGRESS NOTES
Subjective   Alka Albert is a 26 y.o. female.     has Essential hypertension; Hyperinsulinemia; PCOS (polycystic ovarian syndrome); Chronic migraine without aura without status migrainosus, not intractable; Class 3 severe obesity due to excess calories with serious comorbidity and body mass index (BMI) of 45.0 to 49.9 in adult (HCC); Chronic hypertension affecting pregnancy; Insulin resistance complicating pregnancy; and HTN in pregnancy, chronic on their problem list.     Chief Complaint   Patient presents with   • Weight Check     phentermine refill   • Contraception        History of Present Illness   Requests evaluation for oral contraception today. No hx DVT and non smoker. LMP 1 month ago today.    Obesity, Body mass index is 41.35 kg/m².  This reflects a 5# weight loss over previous visit 1/18/22 from 269, ht 67in, and previous bmi was 42.1.  This is end of 1st month on phentermine. Qualifies for 2nd month, will send prescription today. No adverse effects of medication.    Reports frequent tearful episodes over past several weeks which are unexplained and she doesn't know why she is crying, but doesn't like it. In past has tried and failed celexa and prozac for anxiety/ depression symptoms, willing to trial a SNRI instead. No self harm thoughts, just suddenly sad mood without reason.     No other complaints today.   Past Surgical History:   Procedure Laterality Date   • NO PAST SURGERIES        Social History     Socioeconomic History   • Marital status:    Tobacco Use   • Smoking status: Never Smoker   • Smokeless tobacco: Never Used   Substance and Sexual Activity   • Alcohol use: No   • Drug use: No   • Sexual activity: Yes     Partners: Male     Birth control/protection: None      The following portions of the patient's history were reviewed and updated as appropriate: allergies, current medications, past family history, past medical history, past social history, past surgical history and  problem list.    Review of Systems   Constitutional: Negative.    HENT: Negative.  Negative for congestion.    Eyes: Negative.  Negative for blurred vision, double vision, photophobia and visual disturbance.   Respiratory: Negative.  Negative for cough, shortness of breath, wheezing and stridor.    Cardiovascular: Negative.  Negative for chest pain, palpitations and leg swelling.   Gastrointestinal: Negative.  Negative for abdominal distention, abdominal pain, blood in stool, constipation, diarrhea, nausea, vomiting, GERD and indigestion.   Endocrine: Negative.  Negative for cold intolerance and heat intolerance.   Genitourinary: Negative.  Negative for dysuria, flank pain, frequency and urinary incontinence.   Musculoskeletal: Negative.  Negative for arthralgias and back pain.   Skin: Negative.    Allergic/Immunologic: Negative.  Negative for immunocompromised state.   Neurological: Negative.    Hematological: Negative.    Psychiatric/Behavioral: Negative.  Positive for depressed mood. Negative for agitation, behavioral problems, decreased concentration, dysphoric mood, hallucinations, self-injury, sleep disturbance, suicidal ideas, negative for hyperactivity and stress. The patient is not nervous/anxious.    All other systems reviewed and are negative.    PHQ-9 Depression Screening  Little interest or pleasure in doing things?     Feeling down, depressed, or hopeless?     Trouble falling or staying asleep, or sleeping too much?     Feeling tired or having little energy?     Poor appetite or overeating?     Feeling bad about yourself - or that you are a failure or have let yourself or your family down?     Trouble concentrating on things, such as reading the newspaper or watching television?     Moving or speaking so slowly that other people could have noticed? Or the opposite - being so fidgety or restless that you have been moving around a lot more than usual?     Thoughts that you would be better off dead, or of  hurting yourself in some way?     PHQ-9 Total Score     If you checked off any problems, how difficult have these problems made it for you to do your work, take care of things at home, or get along with other people?      Patient understands the risks associated with this controlled medication, including tolerance and addiction.  Patient also agrees to only obtain this medication from me, and not from a another provider, unless that provider is covering for me in my absence.  Patient also agrees to be compliant in dosing, and not self adjust the dose of medication.  A signed controlled substance agreement is on file, and the patient has received a controlled substance education sheet at this a previous visit.  The patient has also signed a consent for treatment with a controlled substance as per [facility] policy. RA was obtained.    Objective    .  Physical Exam  Vitals and nursing note reviewed.   Constitutional:       General: She is not in acute distress.     Appearance: Normal appearance. She is well-developed. She is obese. She is not ill-appearing or diaphoretic.   HENT:      Head: Normocephalic and atraumatic.   Eyes:      General: No scleral icterus.        Right eye: No discharge.         Left eye: No discharge.      Conjunctiva/sclera: Conjunctivae normal.      Pupils: Pupils are equal, round, and reactive to light.   Neck:      Thyroid: No thyromegaly.      Vascular: No carotid bruit or JVD.      Trachea: No tracheal deviation.   Cardiovascular:      Rate and Rhythm: Normal rate and regular rhythm.      Pulses: Normal pulses.      Heart sounds: Normal heart sounds. No murmur heard.  No friction rub. No gallop.    Pulmonary:      Effort: Pulmonary effort is normal. No respiratory distress.      Breath sounds: Normal breath sounds. No stridor. No wheezing, rhonchi or rales.   Chest:      Chest wall: No tenderness.   Abdominal:      General: Bowel sounds are normal.      Palpations: Abdomen is soft.  "  Musculoskeletal:         General: No tenderness or deformity. Normal range of motion.      Cervical back: Normal range of motion and neck supple. No rigidity or tenderness.      Right lower leg: No edema.      Left lower leg: No edema.   Lymphadenopathy:      Cervical: No cervical adenopathy.   Skin:     General: Skin is warm and dry.      Capillary Refill: Capillary refill takes 2 to 3 seconds.      Coloration: Skin is not jaundiced or pale.      Findings: No bruising, erythema, lesion or rash.   Neurological:      General: No focal deficit present.      Mental Status: She is alert and oriented to person, place, and time. Mental status is at baseline.      Motor: No weakness.      Gait: Gait normal.   Psychiatric:         Mood and Affect: Mood normal.         Behavior: Behavior normal.         Thought Content: Thought content normal.         Judgment: Judgment normal.       Vitals:    02/28/22 0813   BP: 124/78   BP Location: Left arm   Patient Position: Sitting   Pulse: 84   Resp: 18   Temp: 97.2 °F (36.2 °C)   SpO2: 99%   Weight: 120 kg (264 lb)   Height: 170.2 cm (67\")   PainSc: 0-No pain      Body mass index is 41.35 kg/m².      Assessment/Plan   Diagnoses and all orders for this visit:    1. Encounter for oral contraception initial prescription (Primary)  Comments:  trial of Cryselle-28 prescribed today. to start 1st day of menses or 1st sunday after next period starts.take 1 tablet daily at same time.  Orders:  -     norgestrel-ethinyl estradiol (Cryselle-28) 0.3-30 MG-MCG per tablet; Take 1 tablet by mouth Daily.  Dispense: 28 tablet; Refill: 5    2. Class 3 severe obesity due to excess calories without serious comorbidity with body mass index (BMI) of 40.0 to 44.9 in adult (HCC)  Comments:  improved slowly with addition of phentermine and lifestyle changes. 5# wt loss first month of phentermine. refill today for month 2.  Orders:  -     phentermine (ADIPEX-P) 37.5 MG tablet; Take 1 tablet by mouth Every " Morning Before Breakfast.  Dispense: 30 tablet; Refill: 0    3. MDD (major depressive disorder), recurrent episode, mild (HCC)  Comments:  recurrence of previous mild depressed mood, no self harm thought. failed SSRI x2 meds, trial SNRI Effexor low dose today.   Orders:  -     venlafaxine XR (Effexor XR) 37.5 MG 24 hr capsule; Take 1 capsule by mouth Daily.  Dispense: 30 capsule; Refill: 0               Return in about 4 weeks (around 3/28/2022).  There are no Patient Instructions on file for this visit.        This document has been electronically signed by KRYSTA Vizcaino on February 28, 2022 09:05 CST

## 2022-03-09 ENCOUNTER — DOCUMENTATION (OUTPATIENT)
Dept: FAMILY MEDICINE CLINIC | Facility: CLINIC | Age: 27
End: 2022-03-09

## 2022-03-09 RX ORDER — DESVENLAFAXINE SUCCINATE 50 MG/1
50 TABLET, EXTENDED RELEASE ORAL DAILY
Qty: 30 TABLET | Refills: 0 | Status: SHIPPED | OUTPATIENT
Start: 2022-03-09 | End: 2022-03-24 | Stop reason: SDUPTHER

## 2022-03-22 DIAGNOSIS — R21 RASH: Primary | ICD-10-CM

## 2022-03-22 RX ORDER — CLOTRIMAZOLE AND BETAMETHASONE DIPROPIONATE 10; .64 MG/G; MG/G
1 CREAM TOPICAL 2 TIMES DAILY
Qty: 45 G | Refills: 0 | Status: SHIPPED | OUTPATIENT
Start: 2022-03-22 | End: 2022-04-18 | Stop reason: SDUPTHER

## 2022-03-24 DIAGNOSIS — R11.0 NAUSEA: ICD-10-CM

## 2022-03-24 RX ORDER — DESVENLAFAXINE SUCCINATE 50 MG/1
50 TABLET, EXTENDED RELEASE ORAL DAILY
Qty: 30 TABLET | Refills: 0 | Status: SHIPPED | OUTPATIENT
Start: 2022-03-24

## 2022-03-24 RX ORDER — ONDANSETRON 4 MG/1
4 TABLET, ORALLY DISINTEGRATING ORAL EVERY 8 HOURS PRN
Qty: 30 TABLET | Refills: 5 | Status: SHIPPED | OUTPATIENT
Start: 2022-03-24 | End: 2022-11-19 | Stop reason: SDUPTHER

## 2022-03-31 DIAGNOSIS — J06.9 ACUTE URI: Primary | ICD-10-CM

## 2022-03-31 RX ORDER — METHYLPREDNISOLONE 4 MG/1
TABLET ORAL
Qty: 1 EACH | Refills: 0 | Status: SHIPPED | OUTPATIENT
Start: 2022-03-31 | End: 2022-03-31

## 2022-03-31 RX ORDER — AZITHROMYCIN 250 MG/1
TABLET, FILM COATED ORAL
Qty: 6 TABLET | Refills: 0 | Status: SHIPPED | OUTPATIENT
Start: 2022-03-31 | End: 2022-06-28

## 2022-03-31 RX ORDER — AZITHROMYCIN 250 MG/1
TABLET, FILM COATED ORAL
Qty: 6 TABLET | Refills: 0 | Status: SHIPPED | OUTPATIENT
Start: 2022-03-31 | End: 2022-03-31

## 2022-03-31 RX ORDER — METHYLPREDNISOLONE 4 MG/1
TABLET ORAL
Qty: 1 EACH | Refills: 0 | Status: SHIPPED | OUTPATIENT
Start: 2022-03-31 | End: 2022-06-28

## 2022-04-18 DIAGNOSIS — R21 RASH: ICD-10-CM

## 2022-04-18 RX ORDER — CLOTRIMAZOLE AND BETAMETHASONE DIPROPIONATE 10; .64 MG/G; MG/G
1 CREAM TOPICAL 2 TIMES DAILY
Qty: 45 G | Refills: 2 | Status: SHIPPED | OUTPATIENT
Start: 2022-04-18 | End: 2022-08-05

## 2022-06-16 ENCOUNTER — LAB (OUTPATIENT)
Dept: LAB | Facility: OTHER | Age: 27
End: 2022-06-16

## 2022-06-16 DIAGNOSIS — Z91.018 ALLERGY TO ALPHA-GAL: ICD-10-CM

## 2022-06-16 DIAGNOSIS — Z13.29 SCREENING FOR THYROID DISORDER: ICD-10-CM

## 2022-06-16 DIAGNOSIS — N92.6 MISSED PERIOD: ICD-10-CM

## 2022-06-16 DIAGNOSIS — E16.1 HYPERINSULINEMIA: ICD-10-CM

## 2022-06-16 DIAGNOSIS — I10 ESSENTIAL HYPERTENSION: Primary | ICD-10-CM

## 2022-06-16 LAB
ALBUMIN SERPL-MCNC: 3.8 G/DL (ref 3.5–5)
ALBUMIN/GLOB SERPL: 1.3 G/DL (ref 1.1–1.8)
ALP SERPL-CCNC: 71 U/L (ref 38–126)
ALT SERPL W P-5'-P-CCNC: 24 U/L
ANION GAP SERPL CALCULATED.3IONS-SCNC: 6 MMOL/L (ref 5–15)
AST SERPL-CCNC: 23 U/L (ref 14–36)
BASOPHILS # BLD AUTO: 0.03 10*3/MM3 (ref 0–0.2)
BASOPHILS NFR BLD AUTO: 0.4 % (ref 0–1.5)
BILIRUB SERPL-MCNC: 0.3 MG/DL (ref 0.2–1.3)
BUN SERPL-MCNC: 11 MG/DL (ref 7–23)
BUN/CREAT SERPL: 14.7 (ref 7–25)
CALCIUM SPEC-SCNC: 8.7 MG/DL (ref 8.4–10.2)
CHLORIDE SERPL-SCNC: 104 MMOL/L (ref 101–112)
CHOLEST SERPL-MCNC: 158 MG/DL (ref 150–200)
CO2 SERPL-SCNC: 27 MMOL/L (ref 22–30)
CREAT SERPL-MCNC: 0.75 MG/DL (ref 0.52–1.04)
DEPRECATED RDW RBC AUTO: 42.3 FL (ref 37–54)
EGFRCR SERPLBLD CKD-EPI 2021: 112.1 ML/MIN/1.73
EOSINOPHIL # BLD AUTO: 0.22 10*3/MM3 (ref 0–0.4)
EOSINOPHIL NFR BLD AUTO: 2.9 % (ref 0.3–6.2)
ERYTHROCYTE [DISTWIDTH] IN BLOOD BY AUTOMATED COUNT: 13.4 % (ref 12.3–15.4)
GLOBULIN UR ELPH-MCNC: 2.9 GM/DL (ref 2.3–3.5)
GLUCOSE SERPL-MCNC: 95 MG/DL (ref 70–99)
HCG SERPL QL: NEGATIVE
HCT VFR BLD AUTO: 36.9 % (ref 34–46.6)
HDLC SERPL-MCNC: 46 MG/DL (ref 40–59)
HGB BLD-MCNC: 11.9 G/DL (ref 12–15.9)
LDLC SERPL CALC-MCNC: 95 MG/DL
LDLC/HDLC SERPL: 2.03 {RATIO} (ref 0–3.22)
LYMPHOCYTES # BLD AUTO: 1.99 10*3/MM3 (ref 0.7–3.1)
LYMPHOCYTES NFR BLD AUTO: 26.5 % (ref 19.6–45.3)
MCH RBC QN AUTO: 28.8 PG (ref 26.6–33)
MCHC RBC AUTO-ENTMCNC: 32.2 G/DL (ref 31.5–35.7)
MCV RBC AUTO: 89.3 FL (ref 79–97)
MONOCYTES # BLD AUTO: 0.6 10*3/MM3 (ref 0.1–0.9)
MONOCYTES NFR BLD AUTO: 8 % (ref 5–12)
NEUTROPHILS NFR BLD AUTO: 4.66 10*3/MM3 (ref 1.7–7)
NEUTROPHILS NFR BLD AUTO: 62.2 % (ref 42.7–76)
PLATELET # BLD AUTO: 253 10*3/MM3 (ref 140–450)
PMV BLD AUTO: 10 FL (ref 6–12)
POTASSIUM SERPL-SCNC: 3.8 MMOL/L (ref 3.4–5)
PROT SERPL-MCNC: 6.7 G/DL (ref 6.3–8.6)
RBC # BLD AUTO: 4.13 10*6/MM3 (ref 3.77–5.28)
SODIUM SERPL-SCNC: 137 MMOL/L (ref 137–145)
T3FREE SERPL-MCNC: 3.53 PG/ML (ref 2–4.4)
T4 SERPL-MCNC: 6.48 MCG/DL (ref 4.5–11.7)
TRIGL SERPL-MCNC: 93 MG/DL
TSH SERPL DL<=0.05 MIU/L-ACNC: 1.57 UIU/ML (ref 0.27–4.2)
VLDLC SERPL-MCNC: 17 MG/DL (ref 5–40)
WBC NRBC COR # BLD: 7.5 10*3/MM3 (ref 3.4–10.8)

## 2022-06-16 PROCEDURE — 86008 ALLG SPEC IGE RECOMB EA: CPT | Performed by: NURSE PRACTITIONER

## 2022-06-16 PROCEDURE — 83527 ASSAY OF INSULIN: CPT | Performed by: NURSE PRACTITIONER

## 2022-06-16 PROCEDURE — 83525 ASSAY OF INSULIN: CPT | Performed by: NURSE PRACTITIONER

## 2022-06-16 PROCEDURE — 86003 ALLG SPEC IGE CRUDE XTRC EA: CPT | Performed by: NURSE PRACTITIONER

## 2022-06-16 PROCEDURE — 80061 LIPID PANEL: CPT | Performed by: NURSE PRACTITIONER

## 2022-06-16 PROCEDURE — 82785 ASSAY OF IGE: CPT | Performed by: NURSE PRACTITIONER

## 2022-06-16 PROCEDURE — 84703 CHORIONIC GONADOTROPIN ASSAY: CPT | Performed by: NURSE PRACTITIONER

## 2022-06-16 PROCEDURE — 80050 GENERAL HEALTH PANEL: CPT | Performed by: NURSE PRACTITIONER

## 2022-06-16 PROCEDURE — 84481 FREE ASSAY (FT-3): CPT | Performed by: NURSE PRACTITIONER

## 2022-06-16 PROCEDURE — 84436 ASSAY OF TOTAL THYROXINE: CPT | Performed by: NURSE PRACTITIONER

## 2022-06-23 LAB
ALPHA-GAL IGE QN: 0.24 KU/L
BEEF IGE QN: 0.33 KU/L
CONV CLASS DESCRIPTION: ABNORMAL
IGE SERPL-ACNC: 1225 IU/ML (ref 6–495)
LAMB IGE QN: 0.24 KU/L
PORK IGE QN: 0.22 KU/L

## 2022-06-24 LAB
INSULIN FREE SERPL-ACNC: 28 UU/ML
INSULIN SERPL-ACNC: 28 UU/ML

## 2022-06-28 ENCOUNTER — OFFICE VISIT (OUTPATIENT)
Dept: FAMILY MEDICINE CLINIC | Facility: CLINIC | Age: 27
End: 2022-06-28

## 2022-06-28 VITALS
SYSTOLIC BLOOD PRESSURE: 126 MMHG | HEIGHT: 67 IN | BODY MASS INDEX: 41.44 KG/M2 | HEART RATE: 84 BPM | DIASTOLIC BLOOD PRESSURE: 78 MMHG | OXYGEN SATURATION: 99 % | TEMPERATURE: 97.1 F | WEIGHT: 264 LBS | RESPIRATION RATE: 16 BRPM

## 2022-06-28 DIAGNOSIS — Z91.018 ALLERGY TO ALPHA-GAL: ICD-10-CM

## 2022-06-28 DIAGNOSIS — E66.01 MORBID OBESITY WITH BODY MASS INDEX (BMI) OF 40.0 OR HIGHER: Primary | Chronic | ICD-10-CM

## 2022-06-28 DIAGNOSIS — E16.1 HYPERINSULINEMIA: Chronic | ICD-10-CM

## 2022-06-28 DIAGNOSIS — G89.29 CHRONIC BILATERAL THORACIC BACK PAIN: Chronic | ICD-10-CM

## 2022-06-28 DIAGNOSIS — Z91.014 ALLERGY TO BEEF: ICD-10-CM

## 2022-06-28 DIAGNOSIS — I10 ESSENTIAL HYPERTENSION: Chronic | ICD-10-CM

## 2022-06-28 DIAGNOSIS — M54.6 CHRONIC BILATERAL THORACIC BACK PAIN: Chronic | ICD-10-CM

## 2022-06-28 DIAGNOSIS — N62 LARGE BREASTS: ICD-10-CM

## 2022-06-28 DIAGNOSIS — E66.01 CLASS 3 SEVERE OBESITY DUE TO EXCESS CALORIES WITH SERIOUS COMORBIDITY AND BODY MASS INDEX (BMI) OF 40.0 TO 44.9 IN ADULT: Chronic | ICD-10-CM

## 2022-06-28 PROBLEM — O10.919 CHRONIC HYPERTENSION AFFECTING PREGNANCY: Status: RESOLVED | Noted: 2020-07-23 | Resolved: 2022-06-28

## 2022-06-28 PROBLEM — G43.709 CHRONIC MIGRAINE WITHOUT AURA WITHOUT STATUS MIGRAINOSUS, NOT INTRACTABLE: Chronic | Status: ACTIVE | Noted: 2018-02-02

## 2022-06-28 PROBLEM — O10.919 HTN IN PREGNANCY, CHRONIC: Status: RESOLVED | Noted: 2020-09-29 | Resolved: 2022-06-28

## 2022-06-28 PROBLEM — E88.819 INSULIN RESISTANCE COMPLICATING PREGNANCY: Chronic | Status: ACTIVE | Noted: 2020-07-23

## 2022-06-28 PROBLEM — O26.899 INSULIN RESISTANCE COMPLICATING PREGNANCY: Chronic | Status: ACTIVE | Noted: 2020-07-23

## 2022-06-28 PROBLEM — E28.2 PCOS (POLYCYSTIC OVARIAN SYNDROME): Chronic | Status: ACTIVE | Noted: 2018-02-02

## 2022-06-28 PROCEDURE — 99214 OFFICE O/P EST MOD 30 MIN: CPT | Performed by: NURSE PRACTITIONER

## 2022-06-28 NOTE — PROGRESS NOTES
Subjective   Alka Albert is a 27 y.o. female.     has Essential hypertension; Hyperinsulinemia; PCOS (polycystic ovarian syndrome); Chronic migraine without aura without status migrainosus, not intractable; Class 3 severe obesity due to excess calories with serious comorbidity and body mass index (BMI) of 40.0 to 44.9 in adult ([facility]); Insulin resistance complicating pregnancy; Morbid obesity with body mass index (BMI) of 40.0 or higher ([facility]); Allergy to alpha-gal; Allergy to beef; and Chronic bilateral thoracic back pain on their problem list.     Chief Complaint   Patient presents with   • Weight Check     Wants a referral to [facility] bariatric          Back Pain  This is a chronic problem. The current episode started more than 1 year ago. The problem occurs constantly. The problem has been gradually worsening since onset. The quality of the pain is described as aching. The pain does not radiate. The pain is at a severity of 3/10. The pain is mild. The pain is worse during the night. The symptoms are aggravated by bending and standing. Stiffness is present at night. Pertinent negatives include no abdominal pain, bladder incontinence, chest pain or dysuria. Risk factors include obesity (very large heavy breasts). She has tried NSAIDs and home exercises for the symptoms. The treatment provided no relief.      Past Surgical History:   Procedure Laterality Date   • NO PAST SURGERIES        Social History     Socioeconomic History   • Marital status:    Tobacco Use   • Smoking status: Never Smoker   • Smokeless tobacco: Never Used   Substance and Sexual Activity   • Alcohol use: No   • Drug use: No   • Sexual activity: Yes     Partners: Male     Birth control/protection: None      The following portions of the patient's history were reviewed and updated as appropriate: allergies, current medications, past family history, past medical history, past social history, past surgical history and problem  list.    Review of Systems   Constitutional: Positive for unexpected weight gain.   HENT: Negative.  Negative for congestion.    Eyes: Negative.  Negative for blurred vision, double vision, photophobia and visual disturbance.   Respiratory: Negative.  Negative for cough, shortness of breath, wheezing and stridor.    Cardiovascular: Negative.  Negative for chest pain, palpitations and leg swelling.   Gastrointestinal: Negative.  Negative for abdominal distention, abdominal pain, blood in stool, constipation, diarrhea, nausea, vomiting, GERD and indigestion.   Endocrine: Negative.  Negative for cold intolerance and heat intolerance.   Genitourinary: Negative.  Negative for dysuria, flank pain, frequency and urinary incontinence.   Musculoskeletal: Positive for back pain. Negative for arthralgias.        Upper back due to large, heavy breasts   Skin: Negative.    Allergic/Immunologic: Negative.  Negative for immunocompromised state.   Neurological: Negative.    Hematological: Negative.    Psychiatric/Behavioral: Negative for agitation, behavioral problems, decreased concentration, dysphoric mood, hallucinations, self-injury, sleep disturbance, suicidal ideas, negative for hyperactivity, depressed mood and stress. The patient is nervous/anxious.    All other systems reviewed and are negative.    PHQ-9 Depression Screening  Little interest or pleasure in doing things?     Feeling down, depressed, or hopeless?     Trouble falling or staying asleep, or sleeping too much?     Feeling tired or having little energy?     Poor appetite or overeating?     Feeling bad about yourself - or that you are a failure or have let yourself or your family down?     Trouble concentrating on things, such as reading the newspaper or watching television?     Moving or speaking so slowly that other people could have noticed? Or the opposite - being so fidgety or restless that you have been moving around a lot more than usual?     Thoughts that  you would be better off dead, or of hurting yourself in some way?     PHQ-9 Total Score     If you checked off any problems, how difficult have these problems made it for you to do your work, take care of things at home, or get along with other people?        Objective   Physical Exam  Vitals and nursing note reviewed.   Constitutional:       General: She is not in acute distress.     Appearance: Normal appearance. She is well-developed. She is obese. She is not ill-appearing or diaphoretic.   HENT:      Head: Normocephalic and atraumatic.   Eyes:      General: No scleral icterus.        Right eye: No discharge.         Left eye: No discharge.      Conjunctiva/sclera: Conjunctivae normal.      Pupils: Pupils are equal, round, and reactive to light.   Neck:      Thyroid: No thyromegaly.      Vascular: No carotid bruit or JVD.      Trachea: No tracheal deviation.   Cardiovascular:      Rate and Rhythm: Normal rate and regular rhythm.      Pulses: Normal pulses.      Heart sounds: Normal heart sounds. No murmur heard.    No friction rub. No gallop.   Pulmonary:      Effort: Pulmonary effort is normal. No respiratory distress.      Breath sounds: Normal breath sounds. No stridor. No wheezing, rhonchi or rales.   Chest:      Chest wall: No tenderness.   Abdominal:      General: Bowel sounds are normal.      Palpations: Abdomen is soft.   Musculoskeletal:         General: No tenderness or deformity. Normal range of motion.      Cervical back: Normal range of motion and neck supple. No rigidity or tenderness.      Right lower leg: No edema.      Left lower leg: No edema.   Lymphadenopathy:      Cervical: No cervical adenopathy.   Skin:     General: Skin is warm and dry.      Capillary Refill: Capillary refill takes 2 to 3 seconds.      Coloration: Skin is not jaundiced or pale.      Findings: No bruising, erythema, lesion or rash.   Neurological:      General: No focal deficit present.      Mental Status: She is alert and  "oriented to person, place, and time. Mental status is at baseline.      Motor: No weakness.      Gait: Gait normal.   Psychiatric:         Mood and Affect: Mood normal.         Behavior: Behavior normal.         Thought Content: Thought content normal.         Judgment: Judgment normal.       Vitals:    06/28/22 1124   BP: 126/78   Pulse: 84   Resp: 16   Temp: 97.1 °F (36.2 °C)   SpO2: 99%   Weight: 120 kg (264 lb)   Height: 170.2 cm (67\")   PainSc: 0-No pain      Body mass index is 41.35 kg/m².    Assessment & Plan   Diagnoses and all orders for this visit:    1. Morbid obesity with body mass index (BMI) of 40.0 or higher ([facility]) (Primary)  Comments:  failed low farshad diet, low CHO diet and phentermine assisted weight loss. referral for bariatric surgery.  Orders:  -     Ambulatory Referral to General Surgery    2. Class 3 severe obesity due to excess calories with serious comorbidity and body mass index (BMI) of 40.0 to 44.9 in adult ([facility])  Comments:  failed low farshad diet, low CHO diet and phentermine assisted weight loss. referral for bariatric surgery.  Orders:  -     Ambulatory Referral to General Surgery    3. Hyperinsulinemia  Comments:  stable.     4. Essential hypertension  Comments:  stable today, just above goal with mild elevation 126/78, HR 84, conitnue Toprol    5. Large breasts    6. Chronic bilateral thoracic back pain  Comments:  likely from carrying very large, heavy breasts    7. Allergy to beef    8. Allergy to alpha-gal        Return in about 1 month (around 7/28/2022).  There are no Patient Instructions on file for this visit.             This document has been electronically signed by KRYSTA Vizcaino on June 28, 2022 13:23 CDT    "

## 2022-07-12 DIAGNOSIS — J06.9 ACUTE URI: Primary | ICD-10-CM

## 2022-07-12 RX ORDER — PREDNISONE 20 MG/1
20 TABLET ORAL 2 TIMES DAILY
Qty: 14 TABLET | Refills: 0 | Status: SHIPPED | OUTPATIENT
Start: 2022-07-12 | End: 2022-07-19

## 2022-07-12 RX ORDER — AZITHROMYCIN 250 MG/1
TABLET, FILM COATED ORAL
Qty: 6 TABLET | Refills: 0 | Status: SHIPPED | OUTPATIENT
Start: 2022-07-12

## 2022-07-12 RX ORDER — BROMPHENIRAMINE MALEATE, PSEUDOEPHEDRINE HYDROCHLORIDE, AND DEXTROMETHORPHAN HYDROBROMIDE 2; 30; 10 MG/5ML; MG/5ML; MG/5ML
5 SYRUP ORAL 4 TIMES DAILY PRN
Qty: 118 ML | Refills: 0 | Status: SHIPPED | OUTPATIENT
Start: 2022-07-12

## 2022-07-14 ENCOUNTER — PRIOR AUTHORIZATION (OUTPATIENT)
Dept: FAMILY MEDICINE CLINIC | Facility: CLINIC | Age: 27
End: 2022-07-14

## 2022-07-14 RX ORDER — VORTIOXETINE 10 MG/1
10 TABLET, FILM COATED ORAL DAILY
Qty: 30 TABLET | Refills: 1 | Status: SHIPPED | OUTPATIENT
Start: 2022-07-14

## 2022-07-14 NOTE — TELEPHONE ENCOUNTER
MARISSA EVANS (Key: [id number])  Rx #: [id number]  Trintellix (formerly Brintellix) 10MG tablets   The request has been approved. The authorization is effective for a maximum of 12 fills from 07/14/2022 to 07/13/2023, as long as the member is enrolled in their current health plan. The request was approved as submitted. The request was approved as submitted. The request was approved as submitted. A written notification letter will follow with additional detail

## 2022-08-04 DIAGNOSIS — R21 RASH: ICD-10-CM

## 2022-08-05 RX ORDER — CLOTRIMAZOLE AND BETAMETHASONE DIPROPIONATE 10; .64 MG/G; MG/G
CREAM TOPICAL
Qty: 45 G | Refills: 0 | Status: SHIPPED | OUTPATIENT
Start: 2022-08-05

## 2022-10-07 DIAGNOSIS — R21 RASH: ICD-10-CM

## 2022-10-07 RX ORDER — CLOTRIMAZOLE AND BETAMETHASONE DIPROPIONATE 10; .64 MG/G; MG/G
CREAM TOPICAL
Qty: 45 G | Refills: 0 | OUTPATIENT
Start: 2022-10-07

## 2022-10-11 RX ORDER — FLUCONAZOLE 150 MG/1
150 TABLET ORAL ONCE
Qty: 2 TABLET | Refills: 0 | Status: SHIPPED | OUTPATIENT
Start: 2022-10-11 | End: 2022-10-11

## 2022-10-11 RX ORDER — NYSTATIN 100000 U/G
1 CREAM TOPICAL 2 TIMES DAILY
Qty: 30 G | Refills: 0 | Status: SHIPPED | OUTPATIENT
Start: 2022-10-11 | End: 2022-10-27 | Stop reason: SDUPTHER

## 2022-10-27 RX ORDER — NYSTATIN 100000 U/G
1 CREAM TOPICAL 2 TIMES DAILY
Qty: 60 G | Refills: 5 | Status: SHIPPED | OUTPATIENT
Start: 2022-10-27

## 2022-11-04 ENCOUNTER — CLINICAL SUPPORT (OUTPATIENT)
Dept: FAMILY MEDICINE CLINIC | Facility: CLINIC | Age: 27
End: 2022-11-04

## 2022-11-04 DIAGNOSIS — Z23 NEED FOR VACCINATION: Primary | ICD-10-CM

## 2022-11-04 PROCEDURE — 90471 IMMUNIZATION ADMIN: CPT | Performed by: PHYSICIAN ASSISTANT

## 2022-11-04 PROCEDURE — 90632 HEPA VACCINE ADULT IM: CPT | Performed by: PHYSICIAN ASSISTANT

## 2022-11-04 PROCEDURE — 86580 TB INTRADERMAL TEST: CPT | Performed by: PHYSICIAN ASSISTANT

## 2022-11-07 ENCOUNTER — LAB (OUTPATIENT)
Dept: LAB | Facility: OTHER | Age: 27
End: 2022-11-07

## 2022-11-07 ENCOUNTER — CLINICAL SUPPORT (OUTPATIENT)
Dept: FAMILY MEDICINE CLINIC | Facility: CLINIC | Age: 27
End: 2022-11-07

## 2022-11-07 DIAGNOSIS — Z92.29 HISTORY OF VARICELLA VACCINATION: ICD-10-CM

## 2022-11-07 DIAGNOSIS — Z11.1 ENCOUNTER FOR PPD SKIN TEST READING: Primary | ICD-10-CM

## 2022-11-07 DIAGNOSIS — Z92.29 HISTORY OF VARICELLA VACCINATION: Primary | ICD-10-CM

## 2022-11-07 LAB
INDURATION: 0 MM (ref 0–10)
Lab: NORMAL
Lab: NORMAL
TB SKIN TEST: NEGATIVE

## 2022-11-07 PROCEDURE — 86787 VARICELLA-ZOSTER ANTIBODY: CPT | Performed by: PHYSICIAN ASSISTANT

## 2022-11-09 LAB — VZV IGG SER IA-ACNC: <135 INDEX

## 2022-11-17 DIAGNOSIS — Z23 NEED FOR VARICELLA VACCINE: Primary | ICD-10-CM

## 2022-11-18 ENCOUNTER — CLINICAL SUPPORT (OUTPATIENT)
Dept: FAMILY MEDICINE CLINIC | Facility: CLINIC | Age: 27
End: 2022-11-18

## 2022-11-18 DIAGNOSIS — Z23 NEED FOR VARICELLA VACCINE: Primary | ICD-10-CM

## 2022-11-18 PROCEDURE — 90471 IMMUNIZATION ADMIN: CPT | Performed by: PHYSICIAN ASSISTANT

## 2022-11-18 PROCEDURE — 90716 VAR VACCINE LIVE SUBQ: CPT | Performed by: PHYSICIAN ASSISTANT

## 2022-11-19 DIAGNOSIS — R11.0 NAUSEA: ICD-10-CM

## 2022-11-19 RX ORDER — ONDANSETRON 4 MG/1
4 TABLET, ORALLY DISINTEGRATING ORAL EVERY 8 HOURS PRN
Qty: 30 TABLET | Refills: 5 | Status: SHIPPED | OUTPATIENT
Start: 2022-11-19 | End: 2023-04-04 | Stop reason: SDUPTHER

## 2023-01-27 RX ORDER — FLUCONAZOLE 150 MG/1
150 TABLET ORAL ONCE
Qty: 2 TABLET | Refills: 0 | Status: SHIPPED | OUTPATIENT
Start: 2023-01-27 | End: 2023-01-27

## 2023-02-15 ENCOUNTER — LAB (OUTPATIENT)
Dept: LAB | Facility: OTHER | Age: 28
End: 2023-02-15
Payer: COMMERCIAL

## 2023-02-15 DIAGNOSIS — Z23 NEED FOR VARICELLA VACCINE: ICD-10-CM

## 2023-02-15 DIAGNOSIS — N92.6 MISSED MENSES: ICD-10-CM

## 2023-02-15 DIAGNOSIS — N92.6 MISSED MENSES: Primary | ICD-10-CM

## 2023-02-15 LAB — HCG SERPL QL: NEGATIVE

## 2023-02-15 PROCEDURE — 86787 VARICELLA-ZOSTER ANTIBODY: CPT | Performed by: PHYSICIAN ASSISTANT

## 2023-02-15 PROCEDURE — 84703 CHORIONIC GONADOTROPIN ASSAY: CPT | Performed by: PHYSICIAN ASSISTANT

## 2023-02-17 LAB — VZV IGG SER IA-ACNC: 3769 INDEX

## 2023-03-27 ENCOUNTER — LAB (OUTPATIENT)
Dept: LAB | Facility: HOSPITAL | Age: 28
End: 2023-03-27
Payer: COMMERCIAL

## 2023-03-27 DIAGNOSIS — Z32.01 POSITIVE URINE PREGNANCY TEST: Primary | ICD-10-CM

## 2023-03-27 DIAGNOSIS — Z32.01 POSITIVE URINE PREGNANCY TEST: ICD-10-CM

## 2023-03-27 LAB
HCG INTACT+B SERPL-ACNC: 2763 MIU/ML
HCG SERPL QL: POSITIVE

## 2023-03-27 PROCEDURE — 84703 CHORIONIC GONADOTROPIN ASSAY: CPT

## 2023-03-27 PROCEDURE — 36415 COLL VENOUS BLD VENIPUNCTURE: CPT

## 2023-03-27 PROCEDURE — 84702 CHORIONIC GONADOTROPIN TEST: CPT

## 2023-04-04 DIAGNOSIS — R11.0 NAUSEA: ICD-10-CM

## 2023-04-04 RX ORDER — PNV NO.95/FERROUS FUM/FOLIC AC 28MG-0.8MG
1 TABLET ORAL DAILY
Qty: 90 TABLET | Refills: 2 | Status: SHIPPED | OUTPATIENT
Start: 2023-04-04

## 2023-04-04 RX ORDER — ONDANSETRON 4 MG/1
4 TABLET, ORALLY DISINTEGRATING ORAL EVERY 8 HOURS PRN
Qty: 30 TABLET | Refills: 2 | Status: SHIPPED | OUTPATIENT
Start: 2023-04-04

## 2023-05-15 RX ORDER — ONDANSETRON 4 MG/1
4 TABLET, ORALLY DISINTEGRATING ORAL EVERY 8 HOURS PRN
Qty: 30 TABLET | Refills: 3 | Status: SHIPPED | OUTPATIENT
Start: 2023-05-15

## 2023-08-02 RX ORDER — ONDANSETRON 4 MG/1
4 TABLET, ORALLY DISINTEGRATING ORAL EVERY 8 HOURS PRN
Qty: 30 TABLET | Refills: 3 | Status: SHIPPED | OUTPATIENT
Start: 2023-08-02

## 2023-08-24 ENCOUNTER — LAB (OUTPATIENT)
Dept: LAB | Facility: OTHER | Age: 28
End: 2023-08-24
Payer: COMMERCIAL

## 2023-08-24 DIAGNOSIS — I10 ESSENTIAL HYPERTENSION: Primary | ICD-10-CM

## 2023-08-24 DIAGNOSIS — I10 ESSENTIAL HYPERTENSION: ICD-10-CM

## 2023-08-24 LAB
ALBUMIN SERPL-MCNC: 3.3 G/DL (ref 3.5–5)
ALBUMIN/GLOB SERPL: 1 G/DL (ref 1.1–1.8)
ALP SERPL-CCNC: 76 U/L (ref 38–126)
ALT SERPL W P-5'-P-CCNC: 12 U/L
ANION GAP SERPL CALCULATED.3IONS-SCNC: 6 MMOL/L (ref 5–15)
AST SERPL-CCNC: 15 U/L (ref 14–36)
BILIRUB SERPL-MCNC: 0.4 MG/DL (ref 0–1.2)
BUN SERPL-MCNC: 5 MG/DL (ref 7–23)
BUN/CREAT SERPL: 10 (ref 7–25)
CALCIUM SPEC-SCNC: 8.4 MG/DL (ref 8.4–10.2)
CHLORIDE SERPL-SCNC: 104 MMOL/L (ref 101–112)
CO2 SERPL-SCNC: 26 MMOL/L (ref 22–30)
CREAT SERPL-MCNC: 0.5 MG/DL (ref 0.52–1.04)
EGFRCR SERPLBLD CKD-EPI 2021: 131.2 ML/MIN/1.73
GLOBULIN UR ELPH-MCNC: 3.2 GM/DL (ref 2.3–3.5)
GLUCOSE SERPL-MCNC: 90 MG/DL (ref 70–99)
MAGNESIUM SERPL-MCNC: 1.9 MG/DL (ref 1.6–2.3)
POTASSIUM SERPL-SCNC: 3.8 MMOL/L (ref 3.4–5)
PROT SERPL-MCNC: 6.5 G/DL (ref 6.3–8.6)
SODIUM SERPL-SCNC: 136 MMOL/L (ref 137–145)

## 2023-08-24 PROCEDURE — 80053 COMPREHEN METABOLIC PANEL: CPT | Performed by: FAMILY MEDICINE

## 2023-08-24 PROCEDURE — 83735 ASSAY OF MAGNESIUM: CPT | Performed by: FAMILY MEDICINE

## 2023-08-24 NOTE — PROGRESS NOTES
Labs are good overall.  Albumin is a little low.  This is a protein in your blood.  It may be dietary.  Magnesium was good.

## 2023-09-11 ENCOUNTER — OFFICE VISIT (OUTPATIENT)
Dept: FAMILY MEDICINE CLINIC | Facility: CLINIC | Age: 28
End: 2023-09-11
Payer: COMMERCIAL

## 2023-09-11 VITALS
BODY MASS INDEX: 41.35 KG/M2 | HEART RATE: 108 BPM | OXYGEN SATURATION: 98 % | SYSTOLIC BLOOD PRESSURE: 122 MMHG | HEIGHT: 67 IN | DIASTOLIC BLOOD PRESSURE: 78 MMHG

## 2023-09-11 DIAGNOSIS — K04.7 DENTAL INFECTION: Primary | ICD-10-CM

## 2023-09-11 PROCEDURE — 1159F MED LIST DOCD IN RCRD: CPT | Performed by: NURSE PRACTITIONER

## 2023-09-11 PROCEDURE — 3078F DIAST BP <80 MM HG: CPT | Performed by: NURSE PRACTITIONER

## 2023-09-11 PROCEDURE — 3074F SYST BP LT 130 MM HG: CPT | Performed by: NURSE PRACTITIONER

## 2023-09-11 PROCEDURE — 99213 OFFICE O/P EST LOW 20 MIN: CPT | Performed by: NURSE PRACTITIONER

## 2023-09-11 PROCEDURE — 1160F RVW MEDS BY RX/DR IN RCRD: CPT | Performed by: NURSE PRACTITIONER

## 2023-09-11 RX ORDER — AMOXICILLIN 500 MG/1
500 CAPSULE ORAL 2 TIMES DAILY
Qty: 14 CAPSULE | Refills: 0 | Status: SHIPPED | OUTPATIENT
Start: 2023-09-11 | End: 2023-09-18

## 2023-09-11 RX ORDER — METHYLDOPA 500 MG/1
500 TABLET, FILM COATED ORAL
COMMUNITY
Start: 2023-07-18 | End: 2024-07-18

## 2023-09-11 NOTE — PROGRESS NOTES
"Chief Complaint  Facial Pain (Pain on the right side of face/)    Subjective        Alka Albert presents to [facility]  Facial Pain  Patient here today with complaints of right-sided facial pain gum bleeding and gums swollen and tender since yesterday.  Denies fever, denies ear pain sore throat cough etc.  Reports that she is 29 weeks pregnant and dental exam is up-to-date.  No rashes.    Objective   Vital Signs:  /78   Pulse 108   Ht 170.2 cm (67\")   SpO2 98%   BMI 41.35 kg/m²   Estimated body mass index is 41.35 kg/m² as calculated from the following:    Height as of this encounter: 170.2 cm (67\").    Weight as of 6/28/22: 120 kg (264 lb).       Physical Exam  Vitals and nursing note reviewed.   Constitutional:       General: She is not in acute distress.     Appearance: Normal appearance. She is not ill-appearing, toxic-appearing or diaphoretic.   HENT:      Head: Normocephalic and atraumatic.      Right Ear: Hearing normal. No middle ear effusion. Tympanic membrane is scarred. Tympanic membrane is not injected or erythematous.      Left Ear: Hearing normal.  No middle ear effusion. Tympanic membrane is scarred. Tympanic membrane is not injected or erythematous.      Mouth/Throat:      Lips: Pink.      Mouth: Mucous membranes are moist.      Pharynx: Oropharynx is clear.      Comments: Gums swollen and erythematous, tender to palp of R upper gums   Cardiovascular:      Rate and Rhythm: Normal rate and regular rhythm.      Heart sounds: Normal heart sounds. No murmur heard.    No friction rub. No gallop.   Pulmonary:      Effort: Pulmonary effort is normal. No respiratory distress.      Breath sounds: Normal breath sounds. No stridor. No wheezing, rhonchi or rales.   Musculoskeletal:      Cervical back: Neck supple. No rigidity or tenderness.   Lymphadenopathy:      Cervical: Cervical adenopathy present.   Skin:     General: Skin is warm and dry.      " Coloration: Skin is not jaundiced or pale.      Findings: No bruising, erythema, lesion or rash.   Neurological:      Mental Status: She is alert and oriented to person, place, and time.      Cranial Nerves: No cranial nerve deficit.      Motor: No weakness.      Coordination: Coordination normal.      Gait: Gait normal.   Psychiatric:         Mood and Affect: Mood normal.         Behavior: Behavior normal.         Thought Content: Thought content normal.         Judgment: Judgment normal.      Result Review :               Assessment and Plan   Diagnoses and all orders for this visit:    1. Dental infection (Primary)  -     amoxicillin (AMOXIL) 500 MG capsule; Take 1 capsule by mouth 2 (Two) Times a Day for 7 days.  Dispense: 14 capsule; Refill: 0    She is treated with Amoxil as above states that she has had Amoxil in the past numerous times without difficulty or problems although she does have an allergy to Rocephin.  She is advised to return here or see her PCP if symptoms should persist or worsen.  All questions and concerns addressed with understanding verbalized.  Patient aware and in agreement to this plan.     I spent 22 minutes caring for Alka on this date of service. This time includes time spent by me in the following activities:preparing for the visit, performing a medically appropriate examination and/or evaluation , counseling and educating the patient/family/caregiver, ordering medications, tests, or procedures, and documenting information in the medical record  Follow Up   Return if symptoms worsen or fail to improve, for Recheck, or sooner as needed.  Patient was given instructions and counseling regarding her condition or for health maintenance advice. Please see specific information pulled into the AVS if appropriate.

## 2023-09-20 DIAGNOSIS — R11.0 NAUSEA: ICD-10-CM

## 2023-09-20 RX ORDER — ONDANSETRON 4 MG/1
4 TABLET, ORALLY DISINTEGRATING ORAL EVERY 8 HOURS PRN
Qty: 30 TABLET | Refills: 2 | Status: SHIPPED | OUTPATIENT
Start: 2023-09-20

## 2023-09-21 DIAGNOSIS — L08.9: Primary | ICD-10-CM

## 2023-09-21 DIAGNOSIS — S90.416A: Primary | ICD-10-CM

## 2023-09-27 ENCOUNTER — OFFICE VISIT (OUTPATIENT)
Dept: PODIATRY | Facility: CLINIC | Age: 28
End: 2023-09-27
Payer: COMMERCIAL

## 2023-09-27 VITALS
SYSTOLIC BLOOD PRESSURE: 131 MMHG | WEIGHT: 264 LBS | BODY MASS INDEX: 41.44 KG/M2 | HEIGHT: 67 IN | DIASTOLIC BLOOD PRESSURE: 99 MMHG | HEART RATE: 122 BPM

## 2023-09-27 DIAGNOSIS — L60.8 DISCOLORED NAILS: ICD-10-CM

## 2023-09-27 DIAGNOSIS — L60.2 ONYCHOGRYPHOSIS: Primary | ICD-10-CM

## 2023-09-27 PROCEDURE — 99213 OFFICE O/P EST LOW 20 MIN: CPT | Performed by: NURSE PRACTITIONER

## 2023-09-27 PROCEDURE — 3080F DIAST BP >= 90 MM HG: CPT | Performed by: NURSE PRACTITIONER

## 2023-09-27 PROCEDURE — 3075F SYST BP GE 130 - 139MM HG: CPT | Performed by: NURSE PRACTITIONER

## 2023-09-27 NOTE — PROGRESS NOTES
Alka Albert  1995  28 y.o. female    09/27/2023    Chief Complaint   Patient presents with    Left Foot - Nail Problem       History of Present Illness    Alka Albert is a 28 y.o.female presents to the clinic today with chief complaint of nail discoloration.       Past Medical History:   Diagnosis Date    IBS (irritable bowel syndrome)     Infertility, female     PCOS (polycystic ovarian syndrome)     Seasonal allergies          Past Surgical History:   Procedure Laterality Date    NO PAST SURGERIES           Family History   Problem Relation Age of Onset    Cancer Mother         Ovary Displasia     No Known Problems Father     Diabetes Sister        Allergies   Allergen Reactions    Alpha-Gal GI Intolerance    Bactrim [Sulfamethoxazole-Trimethoprim] Hives    Beef-Derived Products Hives     Alpha gal positive with food allergies multiple    Cephalosporins Hives    Rocephin [Ceftriaxone] Hives    Milk-Related Compounds Hives and Angioedema     Alpha gal multiple food allergies, one episode of angioedema not sure which food caused 2020.       Social History     Socioeconomic History    Marital status:    Tobacco Use    Smoking status: Never    Smokeless tobacco: Never   Substance and Sexual Activity    Alcohol use: No    Drug use: No    Sexual activity: Yes     Partners: Male     Birth control/protection: None         Current Outpatient Medications   Medication Sig Dispense Refill    EPINEPHrine (EpiPen 2-Anand) 0.3 MG/0.3ML solution auto-injector injection Inject one syringe into the skin x 1 immediately if severe allergic reaction occurs. 2 each 1    methyldopa (ALDOMET) 500 MG tablet Take 1 tablet by mouth.      ondansetron ODT (Zofran ODT) 4 MG disintegrating tablet Place 1 tablet on the tongue Every 8 (Eight) Hours As Needed for Nausea or Vomiting. 30 tablet 2    ondansetron ODT (ZOFRAN-ODT) 4 MG disintegrating tablet PLACE 1 TABLET ON THE TONGUE EVERY 8 (EIGHT) HOURS AS NEEDED FOR NAUSEA OR  "VOMITING. 30 tablet 3    Prenatal Vit-Fe Fumarate-FA (Prenatal Vitamin and Mineral) 28-0.8 MG tablet Take 1 each by mouth Daily. 90 tablet 2     No current facility-administered medications for this visit.       Review of Systems   Constitutional: Negative.    HENT: Negative.     Eyes: Negative.    Respiratory: Negative.     Cardiovascular: Negative.    Gastrointestinal: Negative.    Endocrine: Negative.    Genitourinary: Negative.    Musculoskeletal:         Nail problem    Skin: Negative.    Allergic/Immunologic: Negative.    Neurological: Negative.    Hematological: Negative.    Psychiatric/Behavioral: Negative.         OBJECTIVE    /99   Pulse (!) 122   Ht 170.2 cm (67\")   Wt 120 kg (264 lb)   BMI 41.35 kg/m²     Body mass index is 41.35 kg/m².        Physical Exam  Vitals reviewed.   Constitutional:       Appearance: Normal appearance. She is well-developed.   HENT:      Head: Normocephalic and atraumatic.   Neck:      Trachea: Trachea and phonation normal.   Pulmonary:      Effort: Pulmonary effort is normal. No respiratory distress.   Abdominal:      General: There is no distension.      Palpations: Abdomen is soft.   Feet:      Comments: There is a black line within the overgrown hallux and third nail left foot.  Nail sample was obtained and sent off for evaluation  Skin:     General: Skin is warm and dry.   Neurological:      Mental Status: She is alert and oriented to person, place, and time.      GCS: GCS eye subscore is 4. GCS verbal subscore is 5. GCS motor subscore is 6.   Psychiatric:         Speech: Speech normal.         Behavior: Behavior normal. Behavior is cooperative.         Thought Content: Thought content normal.         Judgment: Judgment normal.              Procedures        ASSESSMENT AND PLAN    Diagnoses and all orders for this visit:    1. Onychogryphosis (Primary)    2. Discolored nails      Body mass index is 41.35 kg/m².    Recommend follow-up with primary care to discuss " BMI greater than 30      Follow-up after lab evaluation as return for further discussion of findings.          This document has been electronically signed by Javier CHAPARRO, FNP-C, ONP-C on September 27, 2023 13:34 CDT